# Patient Record
Sex: FEMALE | Race: BLACK OR AFRICAN AMERICAN | NOT HISPANIC OR LATINO | Employment: OTHER | URBAN - METROPOLITAN AREA
[De-identification: names, ages, dates, MRNs, and addresses within clinical notes are randomized per-mention and may not be internally consistent; named-entity substitution may affect disease eponyms.]

---

## 2017-01-14 DIAGNOSIS — F03.90 DEMENTIA WITHOUT BEHAVIORAL DISTURBANCE, UNSPECIFIED DEMENTIA TYPE: ICD-10-CM

## 2017-01-16 RX ORDER — DONEPEZIL HYDROCHLORIDE 10 MG/1
TABLET, FILM COATED ORAL
Qty: 30 TABLET | Refills: 3 | Status: SHIPPED | OUTPATIENT
Start: 2017-01-16 | End: 2018-03-06

## 2017-01-22 ENCOUNTER — TELEPHONE (OUTPATIENT)
Dept: URGENT CARE | Facility: CLINIC | Age: 67
End: 2017-01-22

## 2017-01-22 ENCOUNTER — OFFICE VISIT (OUTPATIENT)
Dept: URGENT CARE | Facility: CLINIC | Age: 67
End: 2017-01-22
Payer: MEDICARE

## 2017-01-22 VITALS
DIASTOLIC BLOOD PRESSURE: 76 MMHG | OXYGEN SATURATION: 98 % | TEMPERATURE: 98 F | BODY MASS INDEX: 22.36 KG/M2 | HEART RATE: 64 BPM | SYSTOLIC BLOOD PRESSURE: 128 MMHG | WEIGHT: 142.44 LBS | HEIGHT: 67 IN

## 2017-01-22 DIAGNOSIS — R10.84 GENERALIZED ABDOMINAL PAIN: Primary | ICD-10-CM

## 2017-01-22 DIAGNOSIS — K59.00 CONSTIPATION, UNSPECIFIED CONSTIPATION TYPE: ICD-10-CM

## 2017-01-22 PROCEDURE — 1157F ADVNC CARE PLAN IN RCRD: CPT | Mod: S$GLB,,, | Performed by: NURSE PRACTITIONER

## 2017-01-22 PROCEDURE — 3074F SYST BP LT 130 MM HG: CPT | Mod: S$GLB,,, | Performed by: NURSE PRACTITIONER

## 2017-01-22 PROCEDURE — 99999 PR PBB SHADOW E&M-EST. PATIENT-LVL IV: CPT | Mod: PBBFAC,,, | Performed by: NURSE PRACTITIONER

## 2017-01-22 PROCEDURE — 1160F RVW MEDS BY RX/DR IN RCRD: CPT | Mod: S$GLB,,, | Performed by: NURSE PRACTITIONER

## 2017-01-22 PROCEDURE — 99214 OFFICE O/P EST MOD 30 MIN: CPT | Mod: S$GLB,,, | Performed by: NURSE PRACTITIONER

## 2017-01-22 PROCEDURE — 1159F MED LIST DOCD IN RCRD: CPT | Mod: S$GLB,,, | Performed by: NURSE PRACTITIONER

## 2017-01-22 PROCEDURE — 3078F DIAST BP <80 MM HG: CPT | Mod: S$GLB,,, | Performed by: NURSE PRACTITIONER

## 2017-01-22 PROCEDURE — 1125F AMNT PAIN NOTED PAIN PRSNT: CPT | Mod: S$GLB,,, | Performed by: NURSE PRACTITIONER

## 2017-01-22 RX ORDER — LACTULOSE 10 G/15ML
20 SOLUTION ORAL DAILY PRN
Qty: 250 ML | Refills: 0 | Status: SHIPPED | OUTPATIENT
Start: 2017-01-22 | End: 2017-01-24 | Stop reason: ALTCHOICE

## 2017-01-22 NOTE — MR AVS SNAPSHOT
[facility]  [address]  [place] [address]  Phone: [phone]                  Marisol Ora   2017 12:00 PM   Office Visit    Description:  Female : 1950   Provider:  SAMSON Bowling   Department:  [facility]           Reason for Visit     Bloated     Abdominal Pain           Diagnoses this Visit        Comments    Generalized abdominal pain    -  Primary            To Do List           Future Appointments        Provider Department Dept Phone    2017 12:30 PM ADDY XR1 [facility]     2017 8:20 AM Lore Tijerina MD [facility] [phone]      Goals (5 Years of Data)     None      [facility]     [facility] -  Assistance  Registered nurses in the [facility] provide clinical advisement, health education, appointment booking, and other advisory services.  Call for this free service at [phone].             Medications           Message regarding Medications     Verify the changes and/or additions to your medication regime listed below are the same as discussed with your clinician today.  If any of these changes or additions are incorrect, please notify your healthcare provider.        STOP taking these medications     trazodone (DESYREL) 50 MG tablet TAKE 1 TABLET (50 MG TOTAL) BY MOUTH NIGHTLY AS NEEDED FOR INSOMNIA.           Verify that the below list of medications is an accurate representation of the medications you are currently taking.  If none reported, the list may be blank. If incorrect, please contact your healthcare provider. Carry this list with you in case of emergency.           Current Medications     aspirin 81 MG Chew Take 81 mg by mouth once daily.    donepezil (ARICEPT) 10 MG tablet TAKE 1 TABLET (10 MG TOTAL) BY MOUTH EVERY EVENING.    gabapentin (NEURONTIN) 300 MG capsule TAKE 1 CAPSULE (300 MG TOTAL) BY MOUTH EVERY EVENING.  "   losartan-hydrochlorothiazide 100-25 mg (HYZAAR) 100-25 mg per tablet TAKE 1 TABLET BY MOUTH ONCE DAILY.    meloxicam (MOBIC) 15 MG tablet TAKE 1 TABLET (15 MG TOTAL) BY MOUTH ONCE DAILY.    omeprazole (PRILOSEC) 20 MG capsule TAKE 1 CAPSULE (20 MG TOTAL) BY MOUTH ONCE DAILY.    oxybutynin (DITROPAN) 5 MG Tab TAKE 1 TABLET (5 MG TOTAL) BY MOUTH 2 (TWO) TIMES DAILY.    potassium chloride SA (K-DUR,KLOR-CON) 10 MEQ tablet TAKE 1 TABLET (10 MEQ TOTAL) BY MOUTH ONCE DAILY.    simvastatin (ZOCOR) 20 MG tablet TAKE 1 TABLET (20 MG TOTAL) BY MOUTH EVERY EVENING.           Clinical Reference Information           Vital Signs - Last Recorded  Most recent update: 1/22/2017 12:00 PM by Leslee Burnett LPN    BP Pulse Temp Ht Wt SpO2    128/76 (BP Location: Right arm, Patient Position: Sitting, BP Method: Manual) 64 98.4 °F (36.9 °C) 5' 7" (1.702 m) 64.6 kg (142 lb 6.7 oz) 98%    BMI                22.31 kg/m2          Blood Pressure          Most Recent Value    BP  128/76      Allergies as of 1/22/2017     No Known Allergies      Immunizations Administered on Date of Encounter - 1/22/2017     None      Orders Placed During Today's Visit     Future Labs/Procedures Expected by Expires    X-Ray Abdomen Flat And Erect  1/22/2017 1/22/2018      "

## 2017-01-22 NOTE — PATIENT INSTRUCTIONS
Constipation (Adult)  Constipation means that you have bowel movements that are less frequent than usual. Stools often become very hard and difficult to pass.  Constipation is very common. At some point in life it affects almost everyone. Since everyone's bowel habits are different, what is constipation to one person may not be to another. Your healthcare provider may do tests to diagnose constipation. It depends on what he or she finds when evaluating you.    Symptoms of constipation include:  · Abdominal pain  · Bloating  · Vomiting  · Painful bowel movements  · Itching, swelling, bleeding, or pain around the anus  Causes  Constipation can have many causes. These include:  · Diet low in fiber  · Too much dairy  · Not drinking enough liquids  · Lack of exercise or physical activity. This is especially true for older adults.  · Changes in lifestyle or daily routine, including pregnancy, aging, work, and travel  · Frequent use or misuse of laxatives  · Ignoring the urge to have a bowel movement or delaying it until later  · Medicines, such as certain prescription pain medicines, iron supplements, antacids, certain antidepressants, and calcium supplements  · Diseases like irritable bowel syndrome, bowel obstructions, stroke, diabetes, thyroid disease, Parkinson disease, hemorrhoids, and colon cancer  Complications  Potential complications of constipation can include:  · Hemorrhoids  · Rectal bleeding from hemorrhoids or anal fissures (skin tears)  · Hernias  · Dependency on laxatives  · Chronic constipation  · Fecal impaction  · Bowel obstruction or perforation  Home care  All treatment should be done after talking with your healthcare provider. This is especially true if you have another medical problems, are taking prescription medicines, or are an older adult. Treatment most often involves lifestyle changes. You may also need medicines. Your healthcare provider will tell you which will work best for you. Follow  the advice below to help avoid this problem in the future.  Lifestyle changes  These lifestyle changes can help prevent constipation:  · Diet. Eat a high-fiber diet, with fresh fruit and vegetables, and reduce dairy intake, meats, and processed foods  · Fluids. It's important to get enough fluids each day. Drink plenty of water when you eat more fiber. If you are on diet that limits the amount of fluid you can have, talk about this with your healthcare provider.  · Regular exercise. Check with your healthcare provider first.  Medications  Take any medicines as directed. Some laxatives are safe to use only every now and then. Others can be taken on a regular basis. Talk with your doctor or pharmacist if you have questions.  Prescription pain medicines can cause constipation. If you are taking this kind of medicine, ask your healthcare provider if you should also take a stool softener.  Medicines you may take to treat constipation include:  · Fiber supplements  · Stool softeners  · Laxatives  · Enemas  · Rectal suppositories  Follow-up care  Follow up with your healthcare provider if symptoms don't get better in the next few days. You may need to have more tests or see a specialist.  Call 911  Call 911 if any of these occur:  · Trouble breathing  · Stiff, rigid abdomen that is severely painful to touch  · Confusion  · Fainting or loss of consciousness  · Rapid heart rate  · Chest pain  When to seek medical advice  Call your healthcare provider right away if any of these occur:  · Fever over 100.4°F (38°C)  · Failure to resume normal bowel movements  · Pain in your abdomen or back gets worse  · Nausea or vomiting  · Swelling in your abdomen  · Blood in the stool  · Black, tarry stool  · Involuntary weight loss  · Weakness  © 8808-5207 [email or web address]. [address] [place] [address], [place] [address]. All rights reserved. This information is not intended as a substitute for professional medical care. Always follow  your healthcare professional's instructions.

## 2017-01-22 NOTE — PROGRESS NOTES
Subjective:       Patient ID: Marisol Payan is a 66 y.o. female.    Chief Complaint: Bloated (X 1 day ) and Abdominal Pain (RLQ)    Abdominal Pain   This is a new problem. The current episode started 1 to 4 weeks ago. The onset quality is gradual. The problem has been unchanged. The pain is located in the generalized abdominal region. The pain is at a severity of 2/10. The quality of the pain is aching and cramping. The abdominal pain does not radiate. Associated symptoms include belching and flatus. Pertinent negatives include no diarrhea, dysuria, fever, melena, nausea or vomiting. Nothing aggravates the pain. The pain is relieved by nothing. She has tried proton pump inhibitors for the symptoms. Her past medical history is significant for GERD.     Review of Systems   Constitutional: Negative for appetite change, chills and fever.   HENT: Negative for ear pain, sinus pressure, sore throat and trouble swallowing.    Eyes: Negative for visual disturbance.   Respiratory: Negative for shortness of breath.    Cardiovascular: Negative for chest pain.   Gastrointestinal: Positive for abdominal distention, abdominal pain and flatus. Negative for diarrhea, melena, nausea and vomiting.   Endocrine: Negative for cold intolerance, polyphagia and polyuria.   Genitourinary: Negative for decreased urine volume and dysuria.   Musculoskeletal: Negative for back pain.   Skin: Negative for rash.   Allergic/Immunologic: Negative for environmental allergies and food allergies.   Neurological: Negative for dizziness, tremors, weakness and numbness.   Hematological: Does not bruise/bleed easily.   Psychiatric/Behavioral: Negative for confusion and hallucinations. The patient is not nervous/anxious and is not hyperactive.    All other systems reviewed and are negative.      Objective:     Physical Exam   Constitutional: She is oriented to person, place, and time. She appears well-developed and well-nourished.   HENT:   Head:  Normocephalic and atraumatic.   Right Ear: External ear normal.   Left Ear: External ear normal.   Nose: Nose normal.   Mouth/Throat: Oropharynx is clear and moist.   Eyes: Conjunctivae and EOM are normal. Pupils are equal, round, and reactive to light.   Neck: Normal range of motion. Neck supple.   Cardiovascular: Normal rate, regular rhythm, normal heart sounds and intact distal pulses.    No murmur heard.  Pulmonary/Chest: Effort normal and breath sounds normal. She has no wheezes.   Abdominal: Soft. Bowel sounds are normal. She exhibits distension. There is generalized tenderness. There is no rigidity, no rebound, no guarding, no CVA tenderness, no tenderness at McBurney's point and negative Hernandez's sign.   Musculoskeletal: Normal range of motion.   Neurological: She is alert and oriented to person, place, and time. She has normal reflexes.   Skin: Skin is warm and dry. No rash noted.   Psychiatric: She has a normal mood and affect. Her behavior is normal. Judgment and thought content normal.   Nursing note and vitals reviewed.    Assessment:     1. Generalized abdominal pain    2. Constipation, unspecified constipation type      Plan:   Marisol was seen today for bloated and abdominal pain.    Diagnoses and all orders for this visit:    Generalized abdominal pain  -     X-Ray Abdomen Flat And Erect; Future    Constipation, unspecified constipation type    Other orders  -     lactulose (CHRONULAC) 20 gram/30 mL Soln; Take 30 mLs (20 g total) by mouth daily as needed (constipation).    Informed patient that I would notify her of final read of xray results. Did not see any obstructions but moderate amount of stool.  Recommended follow up with PCP next available. Agrees and understands plan.

## 2017-01-24 ENCOUNTER — OFFICE VISIT (OUTPATIENT)
Dept: INTERNAL MEDICINE | Facility: CLINIC | Age: 67
End: 2017-01-24
Payer: MEDICARE

## 2017-01-24 ENCOUNTER — PATIENT MESSAGE (OUTPATIENT)
Dept: INTERNAL MEDICINE | Facility: CLINIC | Age: 67
End: 2017-01-24

## 2017-01-24 ENCOUNTER — HOSPITAL ENCOUNTER (OUTPATIENT)
Dept: RADIOLOGY | Facility: HOSPITAL | Age: 67
Discharge: HOME | End: 2017-01-24
Attending: NURSE PRACTITIONER
Payer: MEDICARE

## 2017-01-24 VITALS
WEIGHT: 143.75 LBS | BODY MASS INDEX: 22.56 KG/M2 | OXYGEN SATURATION: 97 % | SYSTOLIC BLOOD PRESSURE: 118 MMHG | HEIGHT: 67 IN | DIASTOLIC BLOOD PRESSURE: 84 MMHG | HEART RATE: 64 BPM | TEMPERATURE: 96 F

## 2017-01-24 DIAGNOSIS — K21.9 GASTROESOPHAGEAL REFLUX DISEASE, ESOPHAGITIS PRESENCE NOT SPECIFIED: ICD-10-CM

## 2017-01-24 DIAGNOSIS — R10.9 ABDOMINAL PAIN, UNSPECIFIED LOCATION: Primary | ICD-10-CM

## 2017-01-24 DIAGNOSIS — R09.89 CAROTID BRUIT, UNSPECIFIED LATERALITY: ICD-10-CM

## 2017-01-24 DIAGNOSIS — M47.26 OSTEOARTHRITIS OF SPINE WITH RADICULOPATHY, LUMBAR REGION: ICD-10-CM

## 2017-01-24 DIAGNOSIS — K59.00 CONSTIPATION, UNSPECIFIED CONSTIPATION TYPE: ICD-10-CM

## 2017-01-24 DIAGNOSIS — R10.13 EPIGASTRIC PAIN: Primary | ICD-10-CM

## 2017-01-24 DIAGNOSIS — N30.00 ACUTE CYSTITIS WITHOUT HEMATURIA: ICD-10-CM

## 2017-01-24 DIAGNOSIS — I65.29 CAROTID ARTERY CALCIFICATION, UNSPECIFIED LATERALITY: ICD-10-CM

## 2017-01-24 PROCEDURE — 99499 UNLISTED E&M SERVICE: CPT | Mod: S$GLB,,, | Performed by: PHYSICIAN ASSISTANT

## 2017-01-24 PROCEDURE — 3074F SYST BP LT 130 MM HG: CPT | Mod: S$GLB,,, | Performed by: PHYSICIAN ASSISTANT

## 2017-01-24 PROCEDURE — 1160F RVW MEDS BY RX/DR IN RCRD: CPT | Mod: S$GLB,,, | Performed by: PHYSICIAN ASSISTANT

## 2017-01-24 PROCEDURE — 1157F ADVNC CARE PLAN IN RCRD: CPT | Mod: S$GLB,,, | Performed by: PHYSICIAN ASSISTANT

## 2017-01-24 PROCEDURE — 99999 PR PBB SHADOW E&M-EST. PATIENT-LVL IV: CPT | Mod: PBBFAC,,, | Performed by: PHYSICIAN ASSISTANT

## 2017-01-24 PROCEDURE — 3079F DIAST BP 80-89 MM HG: CPT | Mod: S$GLB,,, | Performed by: PHYSICIAN ASSISTANT

## 2017-01-24 PROCEDURE — 99214 OFFICE O/P EST MOD 30 MIN: CPT | Mod: S$GLB,,, | Performed by: PHYSICIAN ASSISTANT

## 2017-01-24 PROCEDURE — 74020 XR ABDOMEN FLAT AND ERECT: CPT | Mod: 26,,, | Performed by: RADIOLOGY

## 2017-01-24 PROCEDURE — 1159F MED LIST DOCD IN RCRD: CPT | Mod: S$GLB,,, | Performed by: PHYSICIAN ASSISTANT

## 2017-01-24 RX ORDER — MELOXICAM 15 MG/1
TABLET ORAL
Qty: 30 TABLET | Refills: 0 | Status: SHIPPED | OUTPATIENT
Start: 2017-01-24 | End: 2017-04-20 | Stop reason: SINTOL

## 2017-01-24 RX ORDER — MELOXICAM 15 MG/1
TABLET ORAL
Qty: 30 TABLET | Refills: 0 | Status: SHIPPED | OUTPATIENT
Start: 2017-01-24 | End: 2017-01-24 | Stop reason: SDUPTHER

## 2017-01-24 RX ORDER — POLYETHYLENE GLYCOL 3350 17 G/17G
17 POWDER, FOR SOLUTION ORAL DAILY
Qty: 10 EACH | Refills: 0 | Status: SHIPPED | OUTPATIENT
Start: 2017-01-24 | End: 2017-01-24 | Stop reason: SDUPTHER

## 2017-01-24 RX ORDER — AMOXICILLIN AND CLAVULANATE POTASSIUM 500; 125 MG/1; MG/1
1 TABLET, FILM COATED ORAL 2 TIMES DAILY
Qty: 14 TABLET | Refills: 0 | Status: SHIPPED | OUTPATIENT
Start: 2017-01-24 | End: 2017-01-31

## 2017-01-24 RX ORDER — POLYETHYLENE GLYCOL 3350 17 G/17G
17 POWDER, FOR SOLUTION ORAL DAILY
Qty: 10 EACH | Refills: 0 | Status: SHIPPED | OUTPATIENT
Start: 2017-01-24 | End: 2017-02-03

## 2017-01-24 NOTE — PROGRESS NOTES
Subjective:      Patient ID: Marisol Payan is a 67 y.o. female.    Chief Complaint: Flank Pain; Abdominal Pain; and Constipation    Constipation   This is a new problem. The current episode started in the past 7 days. The problem has been gradually worsening since onset. The stool is described as pellet like. Associated symptoms include abdominal pain, anorexia, back pain, bloating, nausea and vomiting (Gerd). Pertinent negatives include no diarrhea, difficulty urinating, fecal incontinence, fever, flatus, hematochezia, hemorrhoids, melena, rectal pain or weight loss. Associated symptoms comments: GERD. Risk factors: dementia. Treatments tried: lactulose. The treatment provided no relief. Her past medical history is significant for abdominal surgery (appendectomy), neurologic disease and psychiatric history. There is no history of endocrine disease, inflammatory bowel disease, irritable bowel syndrome, metabolic disease, neuromuscular disease or radiation treatment.   -gassy, right flank pain. Urgent care diagnosed her with constipation, prescribed lactulose. Still no BM.   -also requesting refill on mobic for her OA    Review of Systems   Constitutional: Positive for appetite change. Negative for activity change, chills, diaphoresis, fatigue, fever, unexpected weight change and weight loss.   HENT: Negative.    Eyes: Negative for visual disturbance.   Respiratory: Negative for chest tightness, shortness of breath and wheezing.    Cardiovascular: Negative for chest pain, palpitations and leg swelling.   Gastrointestinal: Positive for abdominal distention, abdominal pain, anorexia, bloating, constipation, nausea and vomiting (Gerd). Negative for anal bleeding, blood in stool, diarrhea, flatus, hematochezia, hemorrhoids, melena and rectal pain.   Genitourinary: Positive for flank pain. Negative for decreased urine volume, difficulty urinating, dysuria, pelvic pain and urgency.   Musculoskeletal: Positive  "for arthralgias and back pain. Negative for myalgias, neck pain and neck stiffness.   Skin: Negative for rash.   Neurological: Negative for dizziness, weakness, light-headedness, numbness and headaches.     Objective:     Visit Vitals    /84 (BP Location: Right arm, Patient Position: Sitting, BP Method: Manual)    Pulse 64    Temp 96.4 °F (35.8 °C) (Tympanic)    Ht 5' 7" (1.702 m)    Wt 65.2 kg (143 lb 11.8 oz)    SpO2 97%    BMI 22.51 kg/m2       Physical Exam   Constitutional: She is oriented to person, place, and time. She appears well-developed and well-nourished.   HENT:   Head: Normocephalic and atraumatic.   Right Ear: External ear normal.   Left Ear: External ear normal.   Nose: Nose normal.   Mouth/Throat: Oropharynx is clear and moist.   Eyes: Conjunctivae and EOM are normal. Pupils are equal, round, and reactive to light.   Neck: Normal range of motion. Neck supple. Carotid bruit is present.   Cardiovascular: Normal rate, regular rhythm and normal heart sounds.  Exam reveals no gallop and no friction rub.    No murmur heard.  Pulmonary/Chest: Effort normal and breath sounds normal. No respiratory distress. She has no wheezes. She has no rales. She exhibits no tenderness.   Abdominal: Soft. She exhibits no distension, no pulsatile liver, no fluid wave, no abdominal bruit, no ascites, no pulsatile midline mass and no mass. Bowel sounds are decreased. There is tenderness in the epigastric area. There is CVA tenderness. There is no rigidity, no rebound and no guarding.   Musculoskeletal: Normal range of motion.   Lymphadenopathy:     She has no cervical adenopathy.   Neurological: She is alert and oriented to person, place, and time.   Skin: Skin is warm and dry.   Psychiatric: She has a normal mood and affect. Her behavior is normal. Judgment and thought content normal.   Vitals reviewed.    Supine upright views of abdomen    Findings: The bowel gas pattern is nonspecific and nonobstructive. " There is no evidence for free air. No pathologic calcifications are identified.  There is mild levoscoliosis of lumbar spine.   Impression       1.  No active process suggested.           Assessment:     1. Abdominal pain, unspecified location    2. Constipation, unspecified constipation type    3. Gastroesophageal reflux disease, esophagitis presence not specified    4. Carotid artery calcification, unspecified laterality    5. Carotid bruit, unspecified laterality    6. Osteoarthritis of spine with radiculopathy, lumbar region      Plan:   Abdominal pain, unspecified location  -     CBC auto differential; Future; Expected date: 1/24/17  -     Comprehensive metabolic panel; Future  -     Urinalysis; Future; Expected date: 1/24/17  -     Ambulatory referral to Gastroenterology  -     Lipase; Future; Expected date: 1/24/17    GERD  -can increase omeprazole to twice daily   --increase fluids. No carbonated beverages or spicy foods. Drew foods.    Constipation, unspecified constipation type  -     polyethylene glycol (GLYCOLAX) 17 gram PwPk; Take 17 g by mouth once daily.  Dispense: 10 each; Refill: 0  -discontinue lactulose start miralax  -A handout on high fiber diet sent home with patient today      Carotid artery calcification, unspecified laterality  -     US Carotid Bilateral; Future; Expected date: 1/24/17    Carotid bruit, unspecified laterality  -     US Carotid Bilateral; Future; Expected date: 1/24/17    Osteoarthritis of spine with radiculopathy, lumbar region  -     meloxicam (MOBIC) 15 MG tablet; TAKE 1 TABLET (15 MG TOTAL) BY MOUTH ONCE DAILY.  Dispense: 30 tablet; Refill: 0  -refilled as requested    Return if symptoms worsen or fail to improve.

## 2017-01-24 NOTE — PATIENT INSTRUCTIONS
Treating Constipation  Constipation is a common and often uncomfortable problem. Constipation means you have bowel movements fewer than 3 times per week, or strain to pass hard, dry stool. It can last a short time. Or it can be a problem that never seems to go away. The good news is that it can often be treated and controlled.    Eat more fiber  One of the best ways to help treat constipation is to increase your fiber intake. You can do this either through diet or by using fiber supplements. Fiber (in whole grains, fruits, and vegetables) adds bulk and absorbs water to soften the stool. This helps the stool pass through the colon more easily. When you increase your fiber intake, do it slowly to avoid side effects such as bloating. Also increase the amount of water that you drink. Eating more of the following foods can add fiber to your diet.  · High-fiber cereals  · Whole grains, bran, and brown rice  · Vegetables such as carrots, broccoli, and greens  · Fresh fruits (especially apples, pears, and dried fruits like raisins and apricots)  · Nuts and legumes (especially beans such as lentils, kidney beans, and lima beans)  Get physically active  Exercise helps improve the working of your colon which helps ease constipation. Try to get some physical activity every day. If you havent been active for a while, talk to your healthcare provider before starting again.  Laxatives  Your healthcare provider may suggest an over-the-counter product to help ease your constipation. He or she may suggest the use of bulk-forming agents or laxatives. The use of laxatives, if used as directed, is common and safe. Follow directions carefully when using them. See your healthcare provider for new-onset constipation, or long-term constipation, to rule out other causes such as medicines or thyroid disease.  © 2938-1496 [employer]. [address] [place] [address], [place] [address]. All rights reserved. This information is not  intended as a substitute for professional medical care. Always follow your healthcare professional's instructions.        Treating Constipation  Constipation is a common and often uncomfortable problem. Constipation means you have bowel movements fewer than 3 times per week, or strain to pass hard, dry stool. It can last a short time. Or it can be a problem that never seems to go away. The good news is that it can often be treated and controlled.    Eat more fiber  One of the best ways to help treat constipation is to increase your fiber intake. You can do this either through diet or by using fiber supplements. Fiber (in whole grains, fruits, and vegetables) adds bulk and absorbs water to soften the stool. This helps the stool pass through the colon more easily. When you increase your fiber intake, do it slowly to avoid side effects such as bloating. Also increase the amount of water that you drink. Eating more of the following foods can add fiber to your diet.  · High-fiber cereals  · Whole grains, bran, and brown rice  · Vegetables such as carrots, broccoli, and greens  · Fresh fruits (especially apples, pears, and dried fruits like raisins and apricots)  · Nuts and legumes (especially beans such as lentils, kidney beans, and lima beans)  Get physically active  Exercise helps improve the working of your colon which helps ease constipation. Try to get some physical activity every day. If you havent been active for a while, talk to your healthcare provider before starting again.  Laxatives  Your healthcare provider may suggest an over-the-counter product to help ease your constipation. He or she may suggest the use of bulk-forming agents or laxatives. The use of laxatives, if used as directed, is common and safe. Follow directions carefully when using them. See your healthcare provider for new-onset constipation, or long-term constipation, to rule out other causes such as medicines or thyroid disease.  © 0896-1781  [employer]. [address] [place] [address], [place] [address]. All rights reserved. This information is not intended as a substitute for professional medical care. Always follow your healthcare professional's instructions.        Discharge Instructions: Eating a High-Fiber Diet  Your health care provider has prescribed a high-fiber diet for you. Fiber is what gives strength and structure to plants. Most grains, beans, vegetables, and fruits contain fiber. Foods rich in fiber are often low in calories and fat, but they fill you up more. These foods may also reduce the risk of certain health problems.  There are two types of fiber:  · Insoluble fiber. This is found in whole grains, cereals, and certain fruits and vegetables (such as apple skins, corn, and beans). Insoluble fiber is made up mainly of plant cell walls. It may prevent constipation and reduce the risk of certain types of cancer.  · Soluble fiber. This type of fiber is found in oats, beans, nuts, and certain fruits and vegetables (such as strawberries and peas). Soluble fiber turns to gel in the digestive system, slowing the movement of the digestive tract. It helps control blood sugar levels and can reduce cholesterol, which may help lower the risk of heart disease. Soluble fiber can also help control appetite.     Home care  · Know how much fiber you need a day. The recommended daily amount of fiber is 25 grams for women and 38 grams for men. After age 50, daily fiber needs drop to 21 grams for women and 30 grams for men.  · Ask your doctor about a fiber supplement. (Always take fiber supplements with a large glass of water.)  · Keep track of how much fiber you eat.  · Eat a variety of foods high in fiber.  · Learn to read and understand food labels.  · Ask your healthcare provider how much water you should be drinking.  · Look for these high-fiber foods:  ¨ Whole-grain breads and cereals  § 6 ounces a day give you about 18 grams of fiber (1 ounce  is equal to 1 slice of bread, 1 cup of dry cereal, or 1/2 cup of cooked rice).  § Include wheat and oat bran cereals, whole-wheat muffins or toast, and corn tortillas in your meals.  ¨ Fruits   § 2 cups a day give you about 8 grams of fiber.  § Apples, oranges, strawberries, pears, and bananas are good sources.  § Fruit juice does not contain as much fiber as the fruit it was made from.  ¨ Vegetables  § 2½ cups a day give you about 11 grams of fiber. Add asparagus, carrots, broccoli, peas, and corn to your meals.  ¨ Legumes  § 1/4 cup a day (in place of meat) gives you about 4 grams of fiber. Try navy beans, lentils, chickpeas, and soybeans.  ¨ Seeds   § A small handful of seeds gives you about 3 grams of fiber. Try sunflower seeds.  Follow-up  Make a follow-up appointment with a nutritionist as directed by our staff.  © 4802-0056 [employer]. [address] [place] [address]. All rights reserved. This information is not intended as a substitute for professional medical care. Always follow your healthcare professional's instructions.        Dehydration    The human body is comprised largely of water. If you lose more fluids than you take in, you can become dehydrated. This means there are not enough fluids in your body for it to function right. Mild dehydration can cause weakness, confusion, or muscle cramps. In extreme cases, it can lead to brain damage and even death. That's why prompt treatment is crucial.  Risk factors  Anyone can become dehydrated. But infants, children, and older adults are at greatest risk. You are most likely to lose fluids with severe vomiting, diarrhea, or a fever. Exercising or working hard--especially in hot weather--can also cause excess fluid loss.  What to do  Drinking liquids is the best way to prevent dehydration. Water is best, but juice or frozen pops can also help. Your doctor may suggest electrolyte solutions for sick infants and young children.  When to  go to the emergency room (ER)  Go to an ER right away for these symptoms:  Adults  · Very dark urine and little urine output  · Dizziness, weakness, confusion, fainting  Children  · Sunken eyes  · Little or no urine output (for infants, no wet diaper in 8 hours)  · Very dark urine  · Skin that doesn't bounce back quickly when pinched  · Crying without tears  What to expect in the ER  Your blood pressure, temperature, and heart rate will be checked. You may have blood or urine tests. The main treatment for dehydration is fluids. You may be given these to drink. Or, you may receive them through a vein in your arm. You also may be treated for diarrhea, vomiting, or a high fever.   © 2780-6371 [employer], [employer]. [address] [place] [address], [place] [address]. All rights reserved. This information is not intended as a substitute for professional medical care. Always follow your healthcare professional's instructions.

## 2017-01-24 NOTE — MR AVS SNAPSHOT
[facility] Internal Medicine  [address] [facility] [place] [address]  Phone: [phone]  Fax: [phone]                  Marisol BlandonShayneOsbaldo   2017 10:40 AM   Office Visit    Description:  Female : 1950   Provider:  Marla Reyes PA-C   Department:  [facility] - Internal Medicine           Reason for Visit     Flank Pain     Abdominal Pain     Constipation           Diagnoses this Visit        Comments    Abdominal pain, unspecified location    -  Primary     Constipation, unspecified constipation type         Gastroesophageal reflux disease, esophagitis presence not specified         Carotid artery calcification, unspecified laterality         Carotid bruit, unspecified laterality         Osteoarthritis of spine with radiculopathy, lumbar region                To Do List           Future Appointments        Provider Department Dept Phone    2017 12:05 PM LABORATORY, [facility] [phone]    2017 12:10 PM SPECIMEN, [facility] [phone]    2017 11:00 AM [facility] - Gastroenterology [phone]    2017 12:30 PM [facility] [phone]      Goals (5 Years of Data)     None      Follow-Up and Disposition     Return if symptoms worsen or fail to improve.       These Medications        Disp Refills Start End    meloxicam (MOBIC) 15 MG tablet 30 tablet 0 2017     TAKE 1 TABLET (15 MG TOTAL) BY MOUTH ONCE DAILY.    Pharmacy: [address] Ph #: [phone]       polyethylene glycol (GLYCOLAX) 17 gram PwPk 10 each 0 2017 2/3/2017    Take 17 g by mouth once daily. - Oral    Pharmacy: [address] Ph #: [phone]         [facility] Call     [facility] On Call Nurse Care Line -  Assistance  Registered nurses in the [facility]  provide clinical advisement, health education, appointment booking, and other advisory services.  Call for this free service at [phone].             Medications           Message regarding Medications     Verify the changes and/or additions to your medication regime listed below are the same as discussed with your clinician today.  If any of these changes or additions are incorrect, please notify your healthcare provider.        START taking these NEW medications        Refills    polyethylene glycol (GLYCOLAX) 17 gram PwPk 0    Sig: Take 17 g by mouth once daily.    Class: Normal    Route: Oral      STOP taking these medications     lactulose (CHRONULAC) 20 gram/30 mL Soln Take 30 mLs (20 g total) by mouth daily as needed (constipation).           Verify that the below list of medications is an accurate representation of the medications you are currently taking.  If none reported, the list may be blank. If incorrect, please contact your healthcare provider. Carry this list with you in case of emergency.           Current Medications     aspirin 81 MG Chew Take 81 mg by mouth once daily.    donepezil (ARICEPT) 10 MG tablet TAKE 1 TABLET (10 MG TOTAL) BY MOUTH EVERY EVENING.    gabapentin (NEURONTIN) 300 MG capsule TAKE 1 CAPSULE (300 MG TOTAL) BY MOUTH EVERY EVENING.    losartan-hydrochlorothiazide 100-25 mg (HYZAAR) 100-25 mg per tablet TAKE 1 TABLET BY MOUTH ONCE DAILY.    meloxicam (MOBIC) 15 MG tablet TAKE 1 TABLET (15 MG TOTAL) BY MOUTH ONCE DAILY.    omeprazole (PRILOSEC) 20 MG capsule TAKE 1 CAPSULE (20 MG TOTAL) BY MOUTH ONCE DAILY.    oxybutynin (DITROPAN) 5 MG Tab TAKE 1 TABLET (5 MG TOTAL) BY MOUTH 2 (TWO) TIMES DAILY.    polyethylene glycol (GLYCOLAX) 17 gram PwPk Take 17 g by mouth once daily.    potassium chloride SA (K-DUR,KLOR-CON) 10 MEQ tablet TAKE 1 TABLET (10 MEQ TOTAL) BY MOUTH ONCE DAILY.    simvastatin (ZOCOR) 20 MG tablet TAKE 1 TABLET (20 MG TOTAL) BY MOUTH EVERY EVENING.          "  Clinical Reference Information           Vital Signs - Last Recorded  Most recent update: 1/24/2017 10:58 AM by Demetrio Roblero LPN    BP Pulse Temp Ht    118/84 (BP Location: Right arm, Patient Position: Sitting, BP Method: Manual) 64 96.4 °F (35.8 °C) (Tympanic) 5' 7" (1.702 m)    Wt SpO2 BMI    65.2 kg (143 lb 11.8 oz) 97% 22.51 kg/m2      Blood Pressure          Most Recent Value    BP  118/84      Allergies as of 1/24/2017     No Known Allergies      Immunizations Administered on Date of Encounter - 1/24/2017     None      Orders Placed During Today's Visit      Normal Orders This Visit    Ambulatory referral to Gastroenterology     Future Labs/Procedures Expected by Expires    CBC auto differential  1/24/2017 1/24/2018    Lipase  1/24/2017 3/25/2018    Urinalysis  1/24/2017 3/25/2018    US Carotid Bilateral  1/24/2017 1/24/2018    Comprehensive metabolic panel  As directed 1/24/2018      Instructions      Treating Constipation  Constipation is a common and often uncomfortable problem. Constipation means you have bowel movements fewer than 3 times per week, or strain to pass hard, dry stool. It can last a short time. Or it can be a problem that never seems to go away. The good news is that it can often be treated and controlled.    Eat more fiber  One of the best ways to help treat constipation is to increase your fiber intake. You can do this either through diet or by using fiber supplements. Fiber (in whole grains, fruits, and vegetables) adds bulk and absorbs water to soften the stool. This helps the stool pass through the colon more easily. When you increase your fiber intake, do it slowly to avoid side effects such as bloating. Also increase the amount of water that you drink. Eating more of the following foods can add fiber to your diet.  · High-fiber cereals  · Whole grains, bran, and brown rice  · Vegetables such as carrots, broccoli, and greens  · Fresh fruits (especially apples, pears, and dried " fruits like raisins and apricots)  · Nuts and legumes (especially beans such as lentils, kidney beans, and lima beans)  Get physically active  Exercise helps improve the working of your colon which helps ease constipation. Try to get some physical activity every day. If you havent been active for a while, talk to your healthcare provider before starting again.  Laxatives  Your healthcare provider may suggest an over-the-counter product to help ease your constipation. He or she may suggest the use of bulk-forming agents or laxatives. The use of laxatives, if used as directed, is common and safe. Follow directions carefully when using them. See your healthcare provider for new-onset constipation, or long-term constipation, to rule out other causes such as medicines or thyroid disease.  © 7790-5539 [employer]. [address] [place] [address], [place] [address]. All rights reserved. This information is not intended as a substitute for professional medical care. Always follow your healthcare professional's instructions.        Treating Constipation  Constipation is a common and often uncomfortable problem. Constipation means you have bowel movements fewer than 3 times per week, or strain to pass hard, dry stool. It can last a short time. Or it can be a problem that never seems to go away. The good news is that it can often be treated and controlled.    Eat more fiber  One of the best ways to help treat constipation is to increase your fiber intake. You can do this either through diet or by using fiber supplements. Fiber (in whole grains, fruits, and vegetables) adds bulk and absorbs water to soften the stool. This helps the stool pass through the colon more easily. When you increase your fiber intake, do it slowly to avoid side effects such as bloating. Also increase the amount of water that you drink. Eating more of the following foods can add fiber to your diet.  · High-fiber cereals  · Whole grains, bran, and  brown rice  · Vegetables such as carrots, broccoli, and greens  · Fresh fruits (especially apples, pears, and dried fruits like raisins and apricots)  · Nuts and legumes (especially beans such as lentils, kidney beans, and lima beans)  Get physically active  Exercise helps improve the working of your colon which helps ease constipation. Try to get some physical activity every day. If you havent been active for a while, talk to your healthcare provider before starting again.  Laxatives  Your healthcare provider may suggest an over-the-counter product to help ease your constipation. He or she may suggest the use of bulk-forming agents or laxatives. The use of laxatives, if used as directed, is common and safe. Follow directions carefully when using them. See your healthcare provider for new-onset constipation, or long-term constipation, to rule out other causes such as medicines or thyroid disease.  © 9121-7127 [employer]. [address] [place] [address]. All rights reserved. This information is not intended as a substitute for professional medical care. Always follow your healthcare professional's instructions.        Discharge Instructions: Eating a High-Fiber Diet  Your health care provider has prescribed a high-fiber diet for you. Fiber is what gives strength and structure to plants. Most grains, beans, vegetables, and fruits contain fiber. Foods rich in fiber are often low in calories and fat, but they fill you up more. These foods may also reduce the risk of certain health problems.  There are two types of fiber:  · Insoluble fiber. This is found in whole grains, cereals, and certain fruits and vegetables (such as apple skins, corn, and beans). Insoluble fiber is made up mainly of plant cell walls. It may prevent constipation and reduce the risk of certain types of cancer.  · Soluble fiber. This type of fiber is found in oats, beans, nuts, and certain fruits and vegetables (such as  strawberries and peas). Soluble fiber turns to gel in the digestive system, slowing the movement of the digestive tract. It helps control blood sugar levels and can reduce cholesterol, which may help lower the risk of heart disease. Soluble fiber can also help control appetite.     Home care  · Know how much fiber you need a day. The recommended daily amount of fiber is 25 grams for women and 38 grams for men. After age 50, daily fiber needs drop to 21 grams for women and 30 grams for men.  · Ask your doctor about a fiber supplement. (Always take fiber supplements with a large glass of water.)  · Keep track of how much fiber you eat.  · Eat a variety of foods high in fiber.  · Learn to read and understand food labels.  · Ask your healthcare provider how much water you should be drinking.  · Look for these high-fiber foods:  ¨ Whole-grain breads and cereals  § 6 ounces a day give you about 18 grams of fiber (1 ounce is equal to 1 slice of bread, 1 cup of dry cereal, or 1/2 cup of cooked rice).  § Include wheat and oat bran cereals, whole-wheat muffins or toast, and corn tortillas in your meals.  ¨ Fruits   § 2 cups a day give you about 8 grams of fiber.  § Apples, oranges, strawberries, pears, and bananas are good sources.  § Fruit juice does not contain as much fiber as the fruit it was made from.  ¨ Vegetables  § 2½ cups a day give you about 11 grams of fiber. Add asparagus, carrots, broccoli, peas, and corn to your meals.  ¨ Legumes  § 1/4 cup a day (in place of meat) gives you about 4 grams of fiber. Try navy beans, lentils, chickpeas, and soybeans.  ¨ Seeds   § A small handful of seeds gives you about 3 grams of fiber. Try sunflower seeds.  Follow-up  Make a follow-up appointment with a nutritionist as directed by our staff.  © 2684-9787 [employer]. [address] [place] [address], [place] [address]. All rights reserved. This information is not intended as a substitute for professional medical care. Always  follow your healthcare professional's instructions.        Dehydration    The human body is comprised largely of water. If you lose more fluids than you take in, you can become dehydrated. This means there are not enough fluids in your body for it to function right. Mild dehydration can cause weakness, confusion, or muscle cramps. In extreme cases, it can lead to brain damage and even death. That's why prompt treatment is crucial.  Risk factors  Anyone can become dehydrated. But infants, children, and older adults are at greatest risk. You are most likely to lose fluids with severe vomiting, diarrhea, or a fever. Exercising or working hard--especially in hot weather--can also cause excess fluid loss.  What to do  Drinking liquids is the best way to prevent dehydration. Water is best, but juice or frozen pops can also help. Your doctor may suggest electrolyte solutions for sick infants and young children.  When to go to the emergency room (ER)  Go to an ER right away for these symptoms:  Adults  · Very dark urine and little urine output  · Dizziness, weakness, confusion, fainting  Children  · Sunken eyes  · Little or no urine output (for infants, no wet diaper in 8 hours)  · Very dark urine  · Skin that doesn't bounce back quickly when pinched  · Crying without tears  What to expect in the ER  Your blood pressure, temperature, and heart rate will be checked. You may have blood or urine tests. The main treatment for dehydration is fluids. You may be given these to drink. Or, you may receive them through a vein in your arm. You also may be treated for diarrhea, vomiting, or a high fever.   © 2744-3179 [employer]. [address] [place] [address], [place] [address]. All rights reserved. This information is not intended as a substitute for professional medical care. Always follow your healthcare professional's instructions.

## 2017-01-26 ENCOUNTER — TELEPHONE (OUTPATIENT)
Dept: INTERNAL MEDICINE | Facility: CLINIC | Age: 67
End: 2017-01-26

## 2017-01-26 NOTE — TELEPHONE ENCOUNTER
[facility] and states that they couldn't test pt blood bc it hemolyzed. So they dont have a results for the CBC amylase and lipase. Can you put more orders in so pt can come get more blood drawn. Thanks please danna

## 2017-01-31 ENCOUNTER — PATIENT MESSAGE (OUTPATIENT)
Dept: INTERNAL MEDICINE | Facility: CLINIC | Age: 67
End: 2017-01-31

## 2017-01-31 DIAGNOSIS — E87.6 HYPOKALEMIA: ICD-10-CM

## 2017-01-31 RX ORDER — POTASSIUM CHLORIDE 750 MG/1
TABLET, EXTENDED RELEASE ORAL
Qty: 30 TABLET | Refills: 2 | Status: SHIPPED | OUTPATIENT
Start: 2017-01-31 | End: 2018-05-01

## 2017-02-02 ENCOUNTER — LAB VISIT (OUTPATIENT)
Dept: LAB | Facility: HOSPITAL | Age: 67
End: 2017-02-02
Attending: FAMILY MEDICINE
Payer: MEDICARE

## 2017-02-02 DIAGNOSIS — R10.13 EPIGASTRIC PAIN: ICD-10-CM

## 2017-02-02 PROCEDURE — 82150 ASSAY OF AMYLASE: CPT

## 2017-02-02 PROCEDURE — 80053 COMPREHEN METABOLIC PANEL: CPT

## 2017-02-02 PROCEDURE — 36415 COLL VENOUS BLD VENIPUNCTURE: CPT | Mod: PO

## 2017-02-02 PROCEDURE — 83690 ASSAY OF LIPASE: CPT

## 2017-02-03 LAB
ALBUMIN SERPL BCP-MCNC: 4.2 G/DL
ALP SERPL-CCNC: 64 U/L
ALT SERPL W/O P-5'-P-CCNC: 14 U/L
AMYLASE SERPL-CCNC: 54 U/L
ANION GAP SERPL CALC-SCNC: 8 MMOL/L
AST SERPL-CCNC: 17 U/L
BILIRUB SERPL-MCNC: 0.5 MG/DL
BUN SERPL-MCNC: 12 MG/DL
CALCIUM SERPL-MCNC: 10 MG/DL
CHLORIDE SERPL-SCNC: 103 MMOL/L
CO2 SERPL-SCNC: 29 MMOL/L
CREAT SERPL-MCNC: 0.8 MG/DL
EST. GFR  (AFRICAN AMERICAN): >60 ML/MIN/1.73 M^2
EST. GFR  (NON AFRICAN AMERICAN): >60 ML/MIN/1.73 M^2
GLUCOSE SERPL-MCNC: 88 MG/DL
LIPASE SERPL-CCNC: 14 U/L
POTASSIUM SERPL-SCNC: 3.6 MMOL/L
PROT SERPL-MCNC: 7.7 G/DL
SODIUM SERPL-SCNC: 140 MMOL/L

## 2017-02-07 ENCOUNTER — HOSPITAL ENCOUNTER (OUTPATIENT)
Dept: RADIOLOGY | Facility: HOSPITAL | Age: 67
Discharge: HOME | End: 2017-02-07
Attending: FAMILY MEDICINE
Payer: MEDICARE

## 2017-02-07 ENCOUNTER — INITIAL CONSULT (OUTPATIENT)
Dept: GASTROENTEROLOGY | Facility: CLINIC | Age: 67
End: 2017-02-07
Payer: MEDICARE

## 2017-02-07 VITALS
HEART RATE: 72 BPM | WEIGHT: 151 LBS | SYSTOLIC BLOOD PRESSURE: 130 MMHG | BODY MASS INDEX: 23.65 KG/M2 | DIASTOLIC BLOOD PRESSURE: 74 MMHG

## 2017-02-07 DIAGNOSIS — I65.29 CAROTID ARTERY CALCIFICATION, UNSPECIFIED LATERALITY: ICD-10-CM

## 2017-02-07 DIAGNOSIS — R10.13 EPIGASTRIC ABDOMINAL PAIN: ICD-10-CM

## 2017-02-07 DIAGNOSIS — R14.0 BLOATING: ICD-10-CM

## 2017-02-07 DIAGNOSIS — R09.89 CAROTID BRUIT, UNSPECIFIED LATERALITY: ICD-10-CM

## 2017-02-07 DIAGNOSIS — R10.11 RUQ ABDOMINAL PAIN: Primary | ICD-10-CM

## 2017-02-07 PROBLEM — I77.9 CAROTID ARTERY DISEASE: Chronic | Status: ACTIVE | Noted: 2017-02-07

## 2017-02-07 PROCEDURE — 93880 EXTRACRANIAL BILAT STUDY: CPT | Mod: 26,,, | Performed by: RADIOLOGY

## 2017-02-07 PROCEDURE — 1160F RVW MEDS BY RX/DR IN RCRD: CPT | Mod: S$GLB,,, | Performed by: NURSE PRACTITIONER

## 2017-02-07 PROCEDURE — 99204 OFFICE O/P NEW MOD 45 MIN: CPT | Mod: S$GLB,,, | Performed by: NURSE PRACTITIONER

## 2017-02-07 PROCEDURE — 1159F MED LIST DOCD IN RCRD: CPT | Mod: S$GLB,,, | Performed by: NURSE PRACTITIONER

## 2017-02-07 PROCEDURE — 1157F ADVNC CARE PLAN IN RCRD: CPT | Mod: S$GLB,,, | Performed by: NURSE PRACTITIONER

## 2017-02-07 PROCEDURE — 93880 EXTRACRANIAL BILAT STUDY: CPT | Mod: TC,PO

## 2017-02-07 PROCEDURE — 3075F SYST BP GE 130 - 139MM HG: CPT | Mod: S$GLB,,, | Performed by: NURSE PRACTITIONER

## 2017-02-07 PROCEDURE — 99999 PR PBB SHADOW E&M-EST. PATIENT-LVL III: CPT | Mod: PBBFAC,,, | Performed by: NURSE PRACTITIONER

## 2017-02-07 PROCEDURE — 1125F AMNT PAIN NOTED PAIN PRSNT: CPT | Mod: S$GLB,,, | Performed by: NURSE PRACTITIONER

## 2017-02-07 PROCEDURE — 3078F DIAST BP <80 MM HG: CPT | Mod: S$GLB,,, | Performed by: NURSE PRACTITIONER

## 2017-02-07 NOTE — MR AVS SNAPSHOT
[facility] - Gastroenterology  [address] [place] [address]  Phone: [phone]  Fax: [phone]                  Marisol Payan   2017 11:00 AM   Initial consult    Description:  Female : 1950   Provider:  SAMSON Ibrahim   Department:  [facility] - Gastroenterology           Reason for Visit     Abdominal Pain     Gastroesophageal Reflux           Diagnoses this Visit        Comments    RUQ abdominal pain    -  Primary     Epigastric abdominal pain         Bloating                To Do List           Future Appointments        Provider Department Dept Phone    2017 12:30 PM [facility]2 [facility] [phone]    2017 10:45 AM [facility]1 [facility] [phone]    3/7/2017 10:20 AM SAMSON Ibrahim [facility] Gastroenterology [phone]    3/13/2017 11:20 AM Devorah Ogden MD [facility] Neurology [phone]      Goals (5 Years of Data)     None      [facility] On Call     [facility] Care Line -  Assistance  Registered nurses in the [facility] Center provide clinical advisement, health education, appointment booking, and other advisory services.  Call for this free service at [phone].             Medications           Message regarding Medications     Verify the changes and/or additions to your medication regime listed below are the same as discussed with your clinician today.  If any of these changes or additions are incorrect, please notify your healthcare provider.             Verify that the below list of medications is an accurate representation of the medications you are currently taking.  If none reported, the list may be blank. If incorrect, please contact your healthcare provider. Carry this list with you in case of emergency.           Current Medications     aspirin 81 MG Chew Take 81 mg by mouth once daily.    donepezil (ARICEPT) 10 MG tablet TAKE 1 TABLET (10 MG TOTAL) BY MOUTH EVERY EVENING.    gabapentin  (NEURONTIN) 300 MG capsule TAKE 1 CAPSULE (300 MG TOTAL) BY MOUTH EVERY EVENING.    losartan-hydrochlorothiazide 100-25 mg (HYZAAR) 100-25 mg per tablet TAKE 1 TABLET BY MOUTH ONCE DAILY.    omeprazole (PRILOSEC) 20 MG capsule TAKE 1 CAPSULE (20 MG TOTAL) BY MOUTH ONCE DAILY.    oxybutynin (DITROPAN) 5 MG Tab TAKE 1 TABLET (5 MG TOTAL) BY MOUTH 2 (TWO) TIMES DAILY.    potassium chloride SA (K-DUR,KLOR-CON) 10 MEQ tablet TAKE 1 TABLET (10 MEQ TOTAL) BY MOUTH ONCE DAILY.    simvastatin (ZOCOR) 20 MG tablet TAKE 1 TABLET (20 MG TOTAL) BY MOUTH EVERY EVENING.    meloxicam (MOBIC) 15 MG tablet TAKE 1 TABLET (15 MG TOTAL) BY MOUTH ONCE DAILY.           Clinical Reference Information           Your Vitals Were     BP Pulse Weight BMI       130/74 72 68.5 kg (151 lb 0.2 oz) 23.65 kg/m2       Blood Pressure          Most Recent Value    BP  130/74      Allergies as of 2/7/2017     No Known Allergies      Immunizations Administered on Date of Encounter - 2/7/2017     None      Orders Placed During Today's Visit      Normal Orders This Visit    Case request GI: ESOPHAGOGASTRODUODENOSCOPY (EGD)     Future Labs/Procedures Expected by Expires    US Abdomen Complete  2/7/2017 2/7/2018      Instructions    Start Miralax once daily       Language Assistance Services     ATTENTION: Language assistance services are available, free of charge. Please call [phone].      ATENCIÓN: Si habla español, tiene a juarez disposición servicios gratuitos de asistencia lingüística. Llame al [phone].     [facility] Ý: N?u b?n nói Ti?ng Vi?t, có các d?ch v? h? tr? ngôn ng? mi?n phí dành cho b?n. G?i s? [phone].         [facility] - Gastroenterology complies with applicable Federal civil rights laws and does not discriminate on the basis of race, color, national origin, age, disability, or sex.

## 2017-02-07 NOTE — PROGRESS NOTES
Clinic Consult:  [facility] Consultation Note    Reason for Consult:  The primary encounter diagnosis was RUQ abdominal pain. Diagnoses of Epigastric abdominal pain and Bloating were also pertinent to this visit.    PCP: Lore Tijerina   [address] [place] [address]    HPI:  This is a 67 y.o. female here for evaluation of the above  She is here with her daughter who provides most of the HPI given the patients cognitive dysfunction  She reports that over the last year, she has had progressively worsening bloating and dyspepsia.  Reports that her mother with often belch both after meals and on an empty stomach.  She will occasionally complain of epigastric pain associated with the belching.  Often regurgitates with the belching.  She has been on a PPI for the last year but has had no significant improvement in her symptom.  Recently she was evaluated by PCP for RUQ abdominal discomfort.  An x-ray was completed and she was found to have constipation.  She has taken a few dose of milk of magnesia which appears to have helped the constipation, but the pt continues with belching and abdominal pain.   No nausea or vomiting. No melena or hematochezia. No weight loss.      Review of Systems   Constitutional: Negative for chills, fever, malaise/fatigue and weight loss.   Respiratory: Negative for cough.    Cardiovascular: Negative for chest pain.   Gastrointestinal:        Per HPI   Musculoskeletal: Negative for myalgias.   Skin: Negative for itching and rash.   Neurological: Negative for headaches.   Psychiatric/Behavioral: The patient is not nervous/anxious.        Medical History:   Past Medical History   Diagnosis Date    Anxiety     Back pain     Dementia     Hyperlipidemia     Hypertension     OAB (overactive bladder)     Rheumatoid arthritis        Surgical History:  Past Surgical History   Procedure Laterality Date    Lumbar disc surgery  2011    Shoulder surgery Left 2000s      rotator cuff    Breast biopsy Right 2005 approx     negative     section  1986    Appendectomy  1980s       Family History:   Family History   Problem Relation Age of Onset    Hypertension Mother     Pancreatic cancer Mother     Cataracts Mother     Glaucoma Sister     Cancer Neg Hx     Stroke Neg Hx        Social History:   Social History   Substance Use Topics    Smoking status: Never Smoker    Smokeless tobacco: Never Used    Alcohol use No       Allergies: Reviewed    Home Medications:   Current Outpatient Prescriptions on File Prior to Visit   Medication Sig Dispense Refill    aspirin 81 MG Chew Take 81 mg by mouth once daily.      donepezil (ARICEPT) 10 MG tablet TAKE 1 TABLET (10 MG TOTAL) BY MOUTH EVERY EVENING. 30 tablet 3    gabapentin (NEURONTIN) 300 MG capsule TAKE 1 CAPSULE (300 MG TOTAL) BY MOUTH EVERY EVENING. 30 capsule 3    losartan-hydrochlorothiazide 100-25 mg (HYZAAR) 100-25 mg per tablet TAKE 1 TABLET BY MOUTH ONCE DAILY. 30 tablet 3    omeprazole (PRILOSEC) 20 MG capsule TAKE 1 CAPSULE (20 MG TOTAL) BY MOUTH ONCE DAILY. 30 capsule 3    oxybutynin (DITROPAN) 5 MG Tab TAKE 1 TABLET (5 MG TOTAL) BY MOUTH 2 (TWO) TIMES DAILY. 60 tablet 3    potassium chloride SA (K-DUR,KLOR-CON) 10 MEQ tablet TAKE 1 TABLET (10 MEQ TOTAL) BY MOUTH ONCE DAILY. 30 tablet 2    simvastatin (ZOCOR) 20 MG tablet TAKE 1 TABLET (20 MG TOTAL) BY MOUTH EVERY EVENING. 30 tablet 3    meloxicam (MOBIC) 15 MG tablet TAKE 1 TABLET (15 MG TOTAL) BY MOUTH ONCE DAILY. 30 tablet 0     No current facility-administered medications on file prior to visit.        Physical Exam:  Vital Signs:  Visit Vitals    /74    Pulse 72    Wt 68.5 kg (151 lb 0.2 oz)    BMI 23.65 kg/m2     Body mass index is 23.65 kg/(m^2).  Physical Exam   Constitutional: She is oriented to person, place, and time. She appears well-developed and well-nourished.   HENT:   Head: Normocephalic.   Eyes: No scleral icterus.   Neck:  Normal range of motion.   Cardiovascular: Normal rate and regular rhythm.    Pulmonary/Chest: Effort normal and breath sounds normal.   Abdominal: Soft. Bowel sounds are normal. She exhibits no distension. There is no tenderness.   Musculoskeletal: Normal range of motion.   Neurological: She is alert and oriented to person, place, and time.   Skin: Skin is warm and dry.   Psychiatric: She has a normal mood and affect.   Vitals reviewed.      Labs: Pertinent labs reviewed.      Assessment:  1. RUQ abdominal pain    2. Epigastric abdominal pain    3. Bloating         Recommendations:  RUQ abdominal pain    Epigastric abdominal pain  Bloating  - Unclear etiology  - Constipation vs GBD vs GERD  - Start Miralax once daily.   - U/S abdomen to R/O GBD.  May eventually need HIDA scan  - Will get EGD to R/O gastritis vs PUD  -     US Abdomen Complete; Future; Expected date: 2/7/17  -     Case request GI: ESOPHAGOGASTRODUODENOSCOPY (EGD)        4-6 weeks depending on results of above.       Thank you so much for allowing me to participate in the care of ERYN Wallace

## 2017-02-07 NOTE — LETTER
February 7, 2017      Lore Tijerina MD  [address] [place] [address]           [facility] Gastroenterology  [address]  [place] [address]  Phone: [phone]  Fax: [phone]          Patient: Marisol Payan   MR Number: [id number]   YOB: 1950   Date of Visit: 2/7/2017       Dear Dr. Lore Tijerina:    Thank you for referring Marisol Payan to me for evaluation. Attached you will find relevant portions of my assessment and plan of care.    If you have questions, please do not hesitate to call me. I look forward to following Marisol Payan along with you.    Sincerely,    Elba Frank, [facility]    Enclosure  CC:  No Recipients    If you would like to receive this communication electronically, please contact [email or web address] or [phone] to request more information on [employer] access.    For providers and/or their staff who would like to refer a patient to [facility], please contact us through our one-stop-shop provider referral line, [facility], [place], at [phone].    If you feel you have received this communication in error or would no longer like to receive these types of communications, please e-mail [email or web address]

## 2017-02-13 ENCOUNTER — TELEPHONE (OUTPATIENT)
Dept: RADIOLOGY | Facility: HOSPITAL | Age: 67
End: 2017-02-13

## 2017-02-14 ENCOUNTER — HOSPITAL ENCOUNTER (OUTPATIENT)
Dept: RADIOLOGY | Facility: HOSPITAL | Age: 67
Discharge: HOME | End: 2017-02-14
Attending: NURSE PRACTITIONER
Payer: MEDICARE

## 2017-02-14 DIAGNOSIS — R10.13 EPIGASTRIC ABDOMINAL PAIN: ICD-10-CM

## 2017-02-14 DIAGNOSIS — R10.11 RUQ ABDOMINAL PAIN: ICD-10-CM

## 2017-02-14 DIAGNOSIS — R14.0 BLOATING: ICD-10-CM

## 2017-02-14 PROCEDURE — 76700 US EXAM ABDOM COMPLETE: CPT | Mod: TC,PO

## 2017-02-14 PROCEDURE — 76700 US EXAM ABDOM COMPLETE: CPT | Mod: 26,,, | Performed by: RADIOLOGY

## 2017-02-20 ENCOUNTER — ANESTHESIA (OUTPATIENT)
Dept: ENDOSCOPY | Facility: HOSPITAL | Age: 67
End: 2017-02-20
Payer: MEDICARE

## 2017-02-20 ENCOUNTER — SURGERY (OUTPATIENT)
Age: 67
End: 2017-02-20

## 2017-02-20 ENCOUNTER — ANESTHESIA EVENT (OUTPATIENT)
Dept: ENDOSCOPY | Facility: HOSPITAL | Age: 67
End: 2017-02-20
Payer: MEDICARE

## 2017-02-20 ENCOUNTER — HOSPITAL ENCOUNTER (OUTPATIENT)
Facility: HOSPITAL | Age: 67
Discharge: HOME | End: 2017-02-20
Attending: INTERNAL MEDICINE | Admitting: INTERNAL MEDICINE
Payer: MEDICARE

## 2017-02-20 VITALS
SYSTOLIC BLOOD PRESSURE: 152 MMHG | HEIGHT: 65 IN | DIASTOLIC BLOOD PRESSURE: 93 MMHG | WEIGHT: 146 LBS | OXYGEN SATURATION: 100 % | TEMPERATURE: 98 F | HEART RATE: 53 BPM | BODY MASS INDEX: 24.32 KG/M2 | RESPIRATION RATE: 16 BRPM

## 2017-02-20 VITALS — RESPIRATION RATE: 19 BRPM

## 2017-02-20 DIAGNOSIS — R10.13 EPIGASTRIC ABDOMINAL PAIN: ICD-10-CM

## 2017-02-20 PROCEDURE — 43239 EGD BIOPSY SINGLE/MULTIPLE: CPT | Performed by: INTERNAL MEDICINE

## 2017-02-20 PROCEDURE — 43239 EGD BIOPSY SINGLE/MULTIPLE: CPT | Mod: ,,, | Performed by: INTERNAL MEDICINE

## 2017-02-20 PROCEDURE — 27201012 HC FORCEPS, HOT/COLD, DISP: Performed by: INTERNAL MEDICINE

## 2017-02-20 PROCEDURE — 88305 TISSUE EXAM BY PATHOLOGIST: CPT | Performed by: PATHOLOGY

## 2017-02-20 PROCEDURE — 25000003 PHARM REV CODE 250: Performed by: NURSE ANESTHETIST, CERTIFIED REGISTERED

## 2017-02-20 PROCEDURE — 37000008 HC ANESTHESIA 1ST 15 MINUTES: Performed by: INTERNAL MEDICINE

## 2017-02-20 PROCEDURE — 37000009 HC ANESTHESIA EA ADD 15 MINS: Performed by: INTERNAL MEDICINE

## 2017-02-20 PROCEDURE — 25000003 PHARM REV CODE 250: Performed by: INTERNAL MEDICINE

## 2017-02-20 PROCEDURE — 63600175 PHARM REV CODE 636 W HCPCS: Performed by: NURSE ANESTHETIST, CERTIFIED REGISTERED

## 2017-02-20 PROCEDURE — 88305 TISSUE EXAM BY PATHOLOGIST: CPT | Mod: 26,,, | Performed by: PATHOLOGY

## 2017-02-20 RX ORDER — SODIUM CHLORIDE, SODIUM LACTATE, POTASSIUM CHLORIDE, CALCIUM CHLORIDE 600; 310; 30; 20 MG/100ML; MG/100ML; MG/100ML; MG/100ML
INJECTION, SOLUTION INTRAVENOUS CONTINUOUS
Status: DISCONTINUED | OUTPATIENT
Start: 2017-02-20 | End: 2017-02-20 | Stop reason: HOSPADM

## 2017-02-20 RX ORDER — PROPOFOL 10 MG/ML
INJECTION, EMULSION INTRAVENOUS
Status: DISCONTINUED | OUTPATIENT
Start: 2017-02-20 | End: 2017-02-20

## 2017-02-20 RX ORDER — LIDOCAINE HCL/PF 100 MG/5ML
SYRINGE (ML) INTRAVENOUS
Status: DISCONTINUED | OUTPATIENT
Start: 2017-02-20 | End: 2017-02-20

## 2017-02-20 RX ORDER — SODIUM CHLORIDE, SODIUM LACTATE, POTASSIUM CHLORIDE, CALCIUM CHLORIDE 600; 310; 30; 20 MG/100ML; MG/100ML; MG/100ML; MG/100ML
INJECTION, SOLUTION INTRAVENOUS CONTINUOUS PRN
Status: DISCONTINUED | OUTPATIENT
Start: 2017-02-20 | End: 2017-02-20

## 2017-02-20 RX ADMIN — SODIUM CHLORIDE, SODIUM LACTATE, POTASSIUM CHLORIDE, AND CALCIUM CHLORIDE: .6; .31; .03; .02 INJECTION, SOLUTION INTRAVENOUS at 12:02

## 2017-02-20 RX ADMIN — PROPOFOL 30 MG: 10 INJECTION, EMULSION INTRAVENOUS at 01:02

## 2017-02-20 RX ADMIN — SODIUM CHLORIDE, SODIUM LACTATE, POTASSIUM CHLORIDE, AND CALCIUM CHLORIDE: 600; 310; 30; 20 INJECTION, SOLUTION INTRAVENOUS at 01:02

## 2017-02-20 RX ADMIN — LIDOCAINE HYDROCHLORIDE 75 MG: 20 INJECTION, SOLUTION INTRAVENOUS at 01:02

## 2017-02-20 RX ADMIN — PROPOFOL 20 MG: 10 INJECTION, EMULSION INTRAVENOUS at 01:02

## 2017-02-20 RX ADMIN — PROPOFOL 140 MG: 10 INJECTION, EMULSION INTRAVENOUS at 01:02

## 2017-02-20 NOTE — INTERVAL H&P NOTE
The patient has been examined and the H&P has been reviewed:    I concur with the findings and no changes have occurred since H&P was written.    Anesthesia/Surgery risks, benefits and alternative options discussed and understood by patient/family.          Active Hospital Problems    Diagnosis  POA    Epigastric abdominal pain [R10.13]  Yes      Resolved Hospital Problems    Diagnosis Date Resolved POA   No resolved problems to display.

## 2017-02-20 NOTE — ANESTHESIA POSTPROCEDURE EVALUATION
"Anesthesia Post Evaluation    Patient: Marisol Payan    Procedure(s) Performed: Procedure(s) (LRB):  ESOPHAGOGASTRODUODENOSCOPY (EGD) (N/A)    Final Anesthesia Type: MAC  Patient location during evaluation: PACU  Patient participation: Yes- Able to Participate  Level of consciousness: awake and alert and oriented  Post-procedure vital signs: reviewed and stable  Pain management: adequate  Airway patency: patent  PONV status at discharge: No PONV  Anesthetic complications: no      Cardiovascular status: blood pressure returned to baseline  Respiratory status: unassisted, room air and spontaneous ventilation  Hydration status: euvolemic  Follow-up not needed.        Visit Vitals    /72 (BP Location: Left arm, Patient Position: Lying, BP Method: Automatic)    Pulse 76    Temp 36.7 °C (98.1 °F) (Oral)    Resp 16    Ht 5' 5" (1.651 m)    Wt 66.2 kg (146 lb)    SpO2 95%    Breastfeeding No    BMI 24.3 kg/m2       Pain/Antoinette Score: No Data Recorded      "

## 2017-02-20 NOTE — H&P (VIEW-ONLY)
Clinic Consult:  [facility] Consultation Note    Reason for Consult:  The primary encounter diagnosis was RUQ abdominal pain. Diagnoses of Epigastric abdominal pain and Bloating were also pertinent to this visit.    PCP: oLre Tijerina   [address] [place] [address]    HPI:  This is a 67 y.o. female here for evaluation of the above  She is here with her daughter who provides most of the HPI given the patients cognitive dysfunction  She reports that over the last year, she has had progressively worsening bloating and dyspepsia.  Reports that her mother with often belch both after meals and on an empty stomach.  She will occasionally complain of epigastric pain associated with the belching.  Often regurgitates with the belching.  She has been on a PPI for the last year but has had no significant improvement in her symptom.  Recently she was evaluated by PCP for RUQ abdominal discomfort.  An x-ray was completed and she was found to have constipation.  She has taken a few dose of milk of magnesia which appears to have helped the constipation, but the pt continues with belching and abdominal pain.   No nausea or vomiting. No melena or hematochezia. No weight loss.      Review of Systems   Constitutional: Negative for chills, fever, malaise/fatigue and weight loss.   Respiratory: Negative for cough.    Cardiovascular: Negative for chest pain.   Gastrointestinal:        Per HPI   Musculoskeletal: Negative for myalgias.   Skin: Negative for itching and rash.   Neurological: Negative for headaches.   Psychiatric/Behavioral: The patient is not nervous/anxious.        Medical History:   Past Medical History   Diagnosis Date    Anxiety     Back pain     Dementia     Hyperlipidemia     Hypertension     OAB (overactive bladder)     Rheumatoid arthritis        Surgical History:  Past Surgical History   Procedure Laterality Date    Lumbar disc surgery  2011    Shoulder surgery Left 2000s      rotator cuff    Breast biopsy Right 2005 approx     negative     section  1986    Appendectomy  1980s       Family History:   Family History   Problem Relation Age of Onset    Hypertension Mother     Pancreatic cancer Mother     Cataracts Mother     Glaucoma Sister     Cancer Neg Hx     Stroke Neg Hx        Social History:   Social History   Substance Use Topics    Smoking status: Never Smoker    Smokeless tobacco: Never Used    Alcohol use No       Allergies: Reviewed    Home Medications:   Current Outpatient Prescriptions on File Prior to Visit   Medication Sig Dispense Refill    aspirin 81 MG Chew Take 81 mg by mouth once daily.      donepezil (ARICEPT) 10 MG tablet TAKE 1 TABLET (10 MG TOTAL) BY MOUTH EVERY EVENING. 30 tablet 3    gabapentin (NEURONTIN) 300 MG capsule TAKE 1 CAPSULE (300 MG TOTAL) BY MOUTH EVERY EVENING. 30 capsule 3    losartan-hydrochlorothiazide 100-25 mg (HYZAAR) 100-25 mg per tablet TAKE 1 TABLET BY MOUTH ONCE DAILY. 30 tablet 3    omeprazole (PRILOSEC) 20 MG capsule TAKE 1 CAPSULE (20 MG TOTAL) BY MOUTH ONCE DAILY. 30 capsule 3    oxybutynin (DITROPAN) 5 MG Tab TAKE 1 TABLET (5 MG TOTAL) BY MOUTH 2 (TWO) TIMES DAILY. 60 tablet 3    potassium chloride SA (K-DUR,KLOR-CON) 10 MEQ tablet TAKE 1 TABLET (10 MEQ TOTAL) BY MOUTH ONCE DAILY. 30 tablet 2    simvastatin (ZOCOR) 20 MG tablet TAKE 1 TABLET (20 MG TOTAL) BY MOUTH EVERY EVENING. 30 tablet 3    meloxicam (MOBIC) 15 MG tablet TAKE 1 TABLET (15 MG TOTAL) BY MOUTH ONCE DAILY. 30 tablet 0     No current facility-administered medications on file prior to visit.        Physical Exam:  Vital Signs:  Visit Vitals    /74    Pulse 72    Wt 68.5 kg (151 lb 0.2 oz)    BMI 23.65 kg/m2     Body mass index is 23.65 kg/(m^2).  Physical Exam   Constitutional: She is oriented to person, place, and time. She appears well-developed and well-nourished.   HENT:   Head: Normocephalic.   Eyes: No scleral icterus.   Neck:  Normal range of motion.   Cardiovascular: Normal rate and regular rhythm.    Pulmonary/Chest: Effort normal and breath sounds normal.   Abdominal: Soft. Bowel sounds are normal. She exhibits no distension. There is no tenderness.   Musculoskeletal: Normal range of motion.   Neurological: She is alert and oriented to person, place, and time.   Skin: Skin is warm and dry.   Psychiatric: She has a normal mood and affect.   Vitals reviewed.      Labs: Pertinent labs reviewed.      Assessment:  1. RUQ abdominal pain    2. Epigastric abdominal pain    3. Bloating         Recommendations:  RUQ abdominal pain    Epigastric abdominal pain  Bloating  - Unclear etiology  - Constipation vs GBD vs GERD  - Start Miralax once daily.   - U/S abdomen to R/O GBD.  May eventually need HIDA scan  - Will get EGD to R/O gastritis vs PUD  -     US Abdomen Complete; Future; Expected date: 2/7/17  -     Case request GI: ESOPHAGOGASTRODUODENOSCOPY (EGD)        4-6 weeks depending on results of above.       Thank you so much for allowing me to participate in the care of ERYN Wallace

## 2017-02-20 NOTE — IP AVS SNAPSHOT
[address] [place] [address]           Patient Discharge Instructions     Our goal is to set you up for success. This packet includes information on your condition, medications, and your home care. It will help you to care for yourself so you don't get sicker and need to go back to the hospital.     Please ask your nurse if you have any questions.        There are many details to remember when preparing to leave the hospital. Here is what you will need to do:    1. Take your medicine. If you are prescribed medications, review your Medication List in the following pages. You may have new medications to  at the pharmacy and others that you'll need to stop taking. Review the instructions for how and when to take your medications. Talk with your doctor or nurses if you are unsure of what to do.     2. Go to your follow-up appointments. Specific follow-up information is listed in the following pages. Your may be contacted by a transition nurse or clinical provider about future appointments. Be sure we have all of the phone numbers to reach you, if needed. Please contact your provider's office if you are unable to make an appointment.     3. Watch for warning signs. Your doctor or nurse will give you detailed warning signs to watch for and when to call for assistance. These instructions may also include educational information about your condition. If you experience any of warning signs to your health, call your doctor.               ** Verify the list of medication(s) below is accurate and up to date. Carry this with you in case of emergency. If your medications have changed, please notify your healthcare provider.             Medication List      CONTINUE taking these medications        Additional Info                      aspirin 81 MG Chew   Refills:  0   Dose:  81 mg    Instructions:  Take 81 mg by mouth once daily.     Begin Date    AM    Noon    PM     Bedtime       donepezil 10 MG tablet   Commonly known as:  ARICEPT   Quantity:  30 tablet   Refills:  3    Instructions:  TAKE 1 TABLET (10 MG TOTAL) BY MOUTH EVERY EVENING.     Begin Date    AM    Noon    PM    Bedtime       gabapentin 300 MG capsule   Commonly known as:  NEURONTIN   Quantity:  30 capsule   Refills:  3    Instructions:  TAKE 1 CAPSULE (300 MG TOTAL) BY MOUTH EVERY EVENING.     Begin Date    AM    Noon    PM    Bedtime       losartan-hydrochlorothiazide 100-25 mg 100-25 mg per tablet   Commonly known as:  HYZAAR   Quantity:  30 tablet   Refills:  3    Instructions:  TAKE 1 TABLET BY MOUTH ONCE DAILY.     Begin Date    AM    Noon    PM    Bedtime       meloxicam 15 MG tablet   Commonly known as:  MOBIC   Quantity:  30 tablet   Refills:  0    Instructions:  TAKE 1 TABLET (15 MG TOTAL) BY MOUTH ONCE DAILY.     Begin Date    AM    Noon    PM    Bedtime       omeprazole 20 MG capsule   Commonly known as:  PRILOSEC   Quantity:  30 capsule   Refills:  3    Instructions:  TAKE 1 CAPSULE (20 MG TOTAL) BY MOUTH ONCE DAILY.     Begin Date    AM    Noon    PM    Bedtime       oxybutynin 5 MG Tab   Commonly known as:  DITROPAN   Quantity:  60 tablet   Refills:  3    Instructions:  TAKE 1 TABLET (5 MG TOTAL) BY MOUTH 2 (TWO) TIMES DAILY.     Begin Date    AM    Noon    PM    Bedtime       potassium chloride SA 10 MEQ tablet   Commonly known as:  K-DUR,KLOR-CON   Quantity:  30 tablet   Refills:  2    Instructions:  TAKE 1 TABLET (10 MEQ TOTAL) BY MOUTH ONCE DAILY.     Begin Date    AM    Noon    PM    Bedtime       simvastatin 20 MG tablet   Commonly known as:  ZOCOR   Quantity:  30 tablet   Refills:  3    Instructions:  TAKE 1 TABLET (20 MG TOTAL) BY MOUTH EVERY EVENING.     Begin Date    AM    Noon    PM    Bedtime                  Please bring to all follow up appointments:    1. A copy of your discharge instructions.  2. All medicines you are currently taking in their original bottles.  3. Identification and  insurance card.    Please arrive 15 minutes ahead of scheduled appointment time.    Please call 24 hours in advance if you must reschedule your appointment and/or time.        Your Scheduled Appointments     Mar 06, 2017  9:40 AM CST   GASTROENTEROLOGY ESTABLISHED PATIENT with SAMSON Ibrahim   [facility] ([facility]    [address] [place] [address]   [phone]            Mar 13, 2017 11:20 AM CDT   Established Patient Visit with Devorah Ogden MD   [facility] ([facility])    [address] [place] [address] [phone]              Follow-up Information     Follow up with Lore Tijerina MD.    Specialty:  Family Medicine    Contact information:    [address] [place] [address] [phone]          Discharge Instructions     Future Orders    Activity as tolerated     Diet general     Questions:    Total calories:      Fat restriction, if any:      Protein restriction, if any:      Na restriction, if any:      Fluid restriction:      Additional restrictions:          Discharge Instructions         Gastritis (Adult)    Gastritis is inflammation and irritation of the stomach lining. It can be present for a short time (acute) or be long lasting (chronic). Gastritis is often caused by infection with bacteria called H pylori. More than a third of people in the [place] have this bacteria in their bodies. In many cases, H pylori causes no problems or symptoms. In some people, though, the infection irritates the stomach lining and causes gastritis. Other causes of stomach irritation include drinking alcohol or taking pain-relieving medicines called NSAIDs (such as aspirin or ibuprofen).   Symptoms of gastritis can include:  · Abdominal pain or bloating  · Loss of appetite  · Nausea or vomiting  · Vomiting blood or having black stools  · Feeling more tired than usual  An inflamed and irritated stomach lining is more likely to develop a sore called an ulcer. To help prevent  this, gastritis should be treated.  Home care  If needed, medicines may be prescribed. If you have H pylori infection, treating it will likely relieve your symptoms. Other changes can help reduce stomach irritation and help it heal.  · If you have been prescribed medicines for H pylori infection, take them as directed. Take all of the medicine until it is finished or your healthcare provider tells you to stop, even if you feel better.  · Your healthcare provider may recommend avoiding NSAIDs. If you take daily aspirin for your heart or other medical reasons, do not stop without talking to your healthcare provider first.  · Avoid drinking alcohol.  · Stop smoking. Smoking can irritate the stomach and delay healing. As much as possible, stay away from second hand smoke.  Follow-up care  Follow up with your healthcare provider, or as advised by our staff. Testing may be needed to check for inflammation or an ulcer.  When to seek medical advice  Call your healthcare provider for any of the following:  · Stomach pain that gets worse or moves to the lower right abdomen (appendix area)  · Chest pain that appears or gets worse, or spreads to the back, neck, shoulder, or arm  · Frequent vomiting (cant keep down liquids)  · Blood in the stool or vomit (red or black in color)  · Feeling weak or dizzy  · Fever of 100.4ºF (38ºC) or higher, or as directed by your healthcare provider  Date Last Reviewed: 6/22/2015 © 2000-2016 [employer] [place] [address]. All rights reserved. This information is not intended as a substitute for professional medical care. Always follow your healthcare professional's instructions.            Admission Information     Date & Time Provider Department CSN    2/20/2017 11:08 AM Asif Solorzano MD [facility] -  [id number]      Care Providers     Provider Role Specialty Primary office phone    Asif Solorzano MD Attending Provider  "Gastroenterology [phone]    Asif Solorzano MD Surgeon  Gastroenterology [phone]      Your Vitals Were     BP Pulse Temp Resp Height Weight    126/72 (BP Location: Left arm, Patient Position: Lying, BP Method: Automatic) 76 98.1 °F (36.7 °C) (Oral) 16 5' 5" (1.651 m) 66.2 kg (146 lb)    SpO2 BMI             95% 24.3 kg/m2         Recent Lab Values     No lab values to display.      Pending Labs     Order Current Status    Specimen to Pathology - Surgery Collected (02/20/17 1344)      Allergies as of 2/20/2017     No Known Allergies      [facility]     [facility] Nurse Care Line - 24/7 Assistance  Unless otherwise directed by your provider, please contact [facility], our nurse care line that is available for 24/7 assistance.     Registered nurses in the [facility] provide clinical advisement, health education, appointment booking, and other advisory services.  Call for this free service at [phone].        Advance Directives     An advance directive is a document which, in the event you are no longer able to make decisions for yourself, tells your healthcare team what kind of treatment you do or do not want to receive, or who you would like to make those decisions for you.  If you do not currently have an advance directive, [facility] encourages you to create one.  For more information call:  [phone]), [phone] ([phone]),  or log on to [email or web address].        Language Assistance Services     ATTENTION: Language assistance services are available, free of charge. Please call [phone].      ATENCIÓN: Si habla español, tiene a juarez disposición servicios gratuitos de asistencia lingüística. Llame al [phone].     CHÚ Ý: N?u b?n nói Ti?ng Vi?t, có các d?ch v? h? tr? ngôn ng? mi?n phí dành cho b?n. G?i s? [phone].         [facility] complies with applicable Federal civil rights laws and does not discriminate " on the basis of race, color, national origin, age, disability, or sex.

## 2017-02-20 NOTE — DISCHARGE INSTRUCTIONS
Gastritis (Adult)    Gastritis is inflammation and irritation of the stomach lining. It can be present for a short time (acute) or be long lasting (chronic). Gastritis is often caused by infection with bacteria called H pylori. More than a third of people in the [place] have this bacteria in their bodies. In many cases, H pylori causes no problems or symptoms. In some people, though, the infection irritates the stomach lining and causes gastritis. Other causes of stomach irritation include drinking alcohol or taking pain-relieving medicines called NSAIDs (such as aspirin or ibuprofen).   Symptoms of gastritis can include:  · Abdominal pain or bloating  · Loss of appetite  · Nausea or vomiting  · Vomiting blood or having black stools  · Feeling more tired than usual  An inflamed and irritated stomach lining is more likely to develop a sore called an ulcer. To help prevent this, gastritis should be treated.  Home care  If needed, medicines may be prescribed. If you have H pylori infection, treating it will likely relieve your symptoms. Other changes can help reduce stomach irritation and help it heal.  · If you have been prescribed medicines for H pylori infection, take them as directed. Take all of the medicine until it is finished or your healthcare provider tells you to stop, even if you feel better.  · Your healthcare provider may recommend avoiding NSAIDs. If you take daily aspirin for your heart or other medical reasons, do not stop without talking to your healthcare provider first.  · Avoid drinking alcohol.  · Stop smoking. Smoking can irritate the stomach and delay healing. As much as possible, stay away from second hand smoke.  Follow-up care  Follow up with your healthcare provider, or as advised by our staff. Testing may be needed to check for inflammation or an ulcer.  When to seek medical advice  Call your healthcare provider for any of the following:  · Stomach pain that gets worse or moves to the lower  right abdomen (appendix area)  · Chest pain that appears or gets worse, or spreads to the back, neck, shoulder, or arm  · Frequent vomiting (cant keep down liquids)  · Blood in the stool or vomit (red or black in color)  · Feeling weak or dizzy  · Fever of 100.4ºF (38ºC) or higher, or as directed by your healthcare provider  Date Last Reviewed: 6/22/2015  © 8767-3396 [employer] [address] [place] [address]. All rights reserved. This information is not intended as a substitute for professional medical care. Always follow your healthcare professional's instructions.

## 2017-02-20 NOTE — ANESTHESIA RELEASE NOTE
"Anesthesia Release from PACU Note    Patient: Marisol Payan    Procedure(s) Performed: Procedure(s) (LRB):  ESOPHAGOGASTRODUODENOSCOPY (EGD) (N/A)    Anesthesia type: MAC    Post pain: Adequate analgesia    Post assessment: no apparent anesthetic complications, tolerated procedure well and no evidence of recall    Last Vitals:   Visit Vitals    /72 (BP Location: Left arm, Patient Position: Lying, BP Method: Automatic)    Pulse 76    Temp 36.7 °C (98.1 °F) (Oral)    Resp 16    Ht 5' 5" (1.651 m)    Wt 66.2 kg (146 lb)    SpO2 95%    Breastfeeding No    BMI 24.3 kg/m2       Post vital signs: stable    Level of consciousness: awake and alert     Nausea/Vomiting: no nausea/no vomiting    Complications: none    Airway Patency: patent    Respiratory: unassisted, spontaneous ventilation, room air    Cardiovascular: stable    Hydration: euvolemic  "

## 2017-02-20 NOTE — ANESTHESIA PREPROCEDURE EVALUATION
02/20/2017  Marisol Payan is a 67 y.o., female.    [facility] Anesthesia Evaluation    I have reviewed the Patient Summary Reports.    I have reviewed the Nursing Notes.   I have reviewed the Medications.     Review of Systems  Anesthesia Hx:  No problems with previous Anesthesia    Social:  Former Smoker, No Alcohol Use    Hematology/Oncology:  Hematology Normal   Oncology Normal     EENT/Dental:EENT/Dental Normal   Cardiovascular:   Exercise tolerance: good Hypertension, well controlled CAD asymptomatic  hyperlipidemia    Renal/:  Renal/ Normal     Hepatic/GI:  Hepatic/GI Normal 0930 last drink of fluid.   Musculoskeletal:   Arthritis     Neurological:   TIA, Neuromuscular Disease,  Dementia    Endocrine:  Endocrine Normal    Psych:   Psychiatric History          Physical Exam  General:  Well nourished    Airway/Jaw/Neck:  Airway Findings: Mallampati: III                Anesthesia Plan  Type of Anesthesia, risks & benefits discussed:  Anesthesia Type:  MAC  Patient's Preference:   Intra-op Monitoring Plan:   Intra-op Monitoring Plan Comments:   Post Op Pain Control Plan:   Post Op Pain Control Plan Comments:   Induction:   IV  Beta Blocker:  Patient is not currently on a Beta-Blocker (No further documentation required).       Informed Consent: Patient understands risks and agrees with Anesthesia plan.  Questions answered. Anesthesia consent signed with patient.  ASA Score: 2     Day of Surgery Review of History & Physical: I have interviewed and examined the patient. I have reviewed the patient's H&P dated: 02/20/17. There are no significant changes.  H&P update referred to the surgeon.         Ready For Surgery From Anesthesia Perspective.

## 2017-02-20 NOTE — TRANSFER OF CARE
"Anesthesia Transfer of Care Note    Patient: Marisol Payan    Procedure(s) Performed: Procedure(s) (LRB):  ESOPHAGOGASTRODUODENOSCOPY (EGD) (N/A)    Patient location: PACU    Anesthesia Type: MAC    Transport from OR: Transported from OR on room air with adequate spontaneous ventilation    Post pain: adequate analgesia    Post assessment: no apparent anesthetic complications    Post vital signs: stable    Level of consciousness: sedated    Nausea/Vomiting: no nausea/vomiting    Complications: none          Last vitals:   Visit Vitals    /72 (BP Location: Left arm, Patient Position: Lying, BP Method: Automatic)    Pulse 76    Temp 36.7 °C (98.1 °F) (Oral)    Resp 16    Ht 5' 5" (1.651 m)    Wt 66.2 kg (146 lb)    SpO2 95%    Breastfeeding No    BMI 24.3 kg/m2     "

## 2017-02-20 NOTE — BRIEF OP NOTE
[facility]  Brief Operative Note     SUMMARY     Surgery Date: 2/20/2017     Surgeon(s) and Role:     * Asif Solorzano MD - Primary    Assisting Surgeon: None    Pre-op Diagnosis:  Bloating [R14.0]  Epigastric abdominal pain [R10.13]  RUQ abdominal pain [R10.11]    Post-op Diagnosis:  Post-Op Diagnosis Codes:     * Bloating [R14.0]     * Epigastric abdominal pain [R10.13]     * RUQ abdominal pain [R10.11]    Procedure(s) (LRB):  ESOPHAGOGASTRODUODENOSCOPY (EGD) (N/A)    Anesthesia: Monitor Anesthesia Care    Description of the findings of the procedure: Procedure completed. See Procedure note for details.     Findings/Key Components:  Procedure completed. See Procedure note for details.     Prosthetic/Devices: None    Estimated Blood Loss: None         Specimens:   Specimen (12h ago through future)    Start     Ordered    02/20/17 1342  Specimen to Pathology - Surgery  Once     Comments:  1.  GASTRIC BIOPSY - R/O H PYLORI    02/20/17 1344          Discharge Note    SUMMARY     Admit Date: 2/20/2017    Discharge Date and Time: 2/20/2017    Hospital Course (synopsis of major diagnoses, care, treatment, and services provided during the course of the hospital stay): Procedure completed. See Procedure note for details.     Final Diagnosis: Post-Op Diagnosis Codes:     * Bloating [R14.0]     * Epigastric abdominal pain [R10.13]     * RUQ abdominal pain [R10.11]    Disposition: Discharge home when discharge criteria met.    Follow Up/Patient Instructions: With primary care physician as previously scheduled.    Medications:  Reconciled Home Medications: Current Discharge Medication List      CONTINUE these medications which have NOT CHANGED    Details   aspirin 81 MG Chew Take 81 mg by mouth once daily.      donepezil (ARICEPT) 10 MG tablet TAKE 1 TABLET (10 MG TOTAL) BY MOUTH EVERY EVENING.  Qty: 30 tablet, Refills: 3    Associated Diagnoses: Dementia without behavioral disturbance, unspecified  dementia type      gabapentin (NEURONTIN) 300 MG capsule TAKE 1 CAPSULE (300 MG TOTAL) BY MOUTH EVERY EVENING.  Qty: 30 capsule, Refills: 3    Associated Diagnoses: Left-sided back pain      losartan-hydrochlorothiazide 100-25 mg (HYZAAR) 100-25 mg per tablet TAKE 1 TABLET BY MOUTH ONCE DAILY.  Qty: 30 tablet, Refills: 3    Associated Diagnoses: Essential hypertension      meloxicam (MOBIC) 15 MG tablet TAKE 1 TABLET (15 MG TOTAL) BY MOUTH ONCE DAILY.  Qty: 30 tablet, Refills: 0    Associated Diagnoses: Osteoarthritis of spine with radiculopathy, lumbar region      omeprazole (PRILOSEC) 20 MG capsule TAKE 1 CAPSULE (20 MG TOTAL) BY MOUTH ONCE DAILY.  Qty: 30 capsule, Refills: 3    Associated Diagnoses: Gastroesophageal reflux disease, esophagitis presence not specified      oxybutynin (DITROPAN) 5 MG Tab TAKE 1 TABLET (5 MG TOTAL) BY MOUTH 2 (TWO) TIMES DAILY.  Qty: 60 tablet, Refills: 3    Associated Diagnoses: OAB (overactive bladder)      potassium chloride SA (K-DUR,KLOR-CON) 10 MEQ tablet TAKE 1 TABLET (10 MEQ TOTAL) BY MOUTH ONCE DAILY.  Qty: 30 tablet, Refills: 2    Associated Diagnoses: Hypokalemia      simvastatin (ZOCOR) 20 MG tablet TAKE 1 TABLET (20 MG TOTAL) BY MOUTH EVERY EVENING.  Qty: 30 tablet, Refills: 3             Discharge Procedure Orders  Diet general     Activity as tolerated       Follow-up Information     Follow up with Lore Tijerina MD.    Specialty:  Family Medicine    Contact information:    [address] [place] [address] [phone]

## 2017-03-01 DIAGNOSIS — B96.81 HELICOBACTER PYLORI GASTRITIS: Primary | ICD-10-CM

## 2017-03-01 DIAGNOSIS — K21.9 GASTROESOPHAGEAL REFLUX DISEASE, ESOPHAGITIS PRESENCE NOT SPECIFIED: ICD-10-CM

## 2017-03-01 DIAGNOSIS — K29.70 HELICOBACTER PYLORI GASTRITIS: Primary | ICD-10-CM

## 2017-03-01 RX ORDER — OMEPRAZOLE 20 MG/1
20 CAPSULE, DELAYED RELEASE ORAL
Qty: 60 CAPSULE | Refills: 1 | Status: SHIPPED | OUTPATIENT
Start: 2017-03-01 | End: 2017-09-02 | Stop reason: SDUPTHER

## 2017-03-01 RX ORDER — CLARITHROMYCIN 500 MG/1
500 TABLET, FILM COATED ORAL 2 TIMES DAILY
Qty: 20 TABLET | Refills: 0 | Status: SHIPPED | OUTPATIENT
Start: 2017-03-01 | End: 2017-03-11

## 2017-03-01 RX ORDER — AMOXICILLIN 500 MG/1
1000 CAPSULE ORAL EVERY 12 HOURS
Qty: 40 CAPSULE | Refills: 0 | Status: SHIPPED | OUTPATIENT
Start: 2017-03-01 | End: 2017-03-11

## 2017-03-06 ENCOUNTER — OFFICE VISIT (OUTPATIENT)
Dept: GASTROENTEROLOGY | Facility: CLINIC | Age: 67
End: 2017-03-06
Payer: MEDICARE

## 2017-03-06 VITALS
SYSTOLIC BLOOD PRESSURE: 110 MMHG | HEIGHT: 65 IN | DIASTOLIC BLOOD PRESSURE: 66 MMHG | HEART RATE: 60 BPM | BODY MASS INDEX: 24.24 KG/M2 | WEIGHT: 145.5 LBS

## 2017-03-06 DIAGNOSIS — A04.8 H. PYLORI INFECTION: Primary | ICD-10-CM

## 2017-03-06 PROCEDURE — 1159F MED LIST DOCD IN RCRD: CPT | Mod: S$GLB,,, | Performed by: NURSE PRACTITIONER

## 2017-03-06 PROCEDURE — 1126F AMNT PAIN NOTED NONE PRSNT: CPT | Mod: S$GLB,,, | Performed by: NURSE PRACTITIONER

## 2017-03-06 PROCEDURE — 99499 UNLISTED E&M SERVICE: CPT | Mod: S$GLB,,, | Performed by: NURSE PRACTITIONER

## 2017-03-06 PROCEDURE — 3074F SYST BP LT 130 MM HG: CPT | Mod: S$GLB,,, | Performed by: NURSE PRACTITIONER

## 2017-03-06 PROCEDURE — 3078F DIAST BP <80 MM HG: CPT | Mod: S$GLB,,, | Performed by: NURSE PRACTITIONER

## 2017-03-06 PROCEDURE — 1157F ADVNC CARE PLAN IN RCRD: CPT | Mod: S$GLB,,, | Performed by: NURSE PRACTITIONER

## 2017-03-06 PROCEDURE — 99214 OFFICE O/P EST MOD 30 MIN: CPT | Mod: S$GLB,,, | Performed by: NURSE PRACTITIONER

## 2017-03-06 PROCEDURE — 99999 PR PBB SHADOW E&M-EST. PATIENT-LVL III: CPT | Mod: PBBFAC,,, | Performed by: NURSE PRACTITIONER

## 2017-03-06 PROCEDURE — 1160F RVW MEDS BY RX/DR IN RCRD: CPT | Mod: S$GLB,,, | Performed by: NURSE PRACTITIONER

## 2017-03-06 NOTE — PATIENT INSTRUCTIONS
Continue current medication    When antibiotics complete, stop omeprazole for 10 days.  On day 11, collect stool for H. Pylori test.

## 2017-03-06 NOTE — PROGRESS NOTES
Clinic Follow Up:  [facility] Follow Up Note    Reason for Follow Up:  The encounter diagnosis was H. pylori infection.    PCP: Lore Tijerina       HPI:  This is a 67 y.o. female here for follow up of the above  She is here with her daughter who provides most of the HPI as the pt has dementia.  She reports that since her recent EGD she has had some mild improvement in her symptoms.    She was just recently started on abx therapy for H. Pylori.    Denies any abdominal pain.  No nausea or vomiting.  No change in bowel pattern.  No melena or hematochezia. No weight loss.    Review of Systems   Constitutional: Negative for chills, fever, malaise/fatigue and weight loss.   Respiratory: Negative for cough.    Cardiovascular: Negative for chest pain.   Gastrointestinal:        Per HPI   Musculoskeletal: Negative for myalgias.   Skin: Negative for itching and rash.   Neurological: Negative for headaches.   Psychiatric/Behavioral: The patient is not nervous/anxious.        Medical History:  Past Medical History:   Diagnosis Date    Alzheimer's dementia     Anxiety     Back pain     Dementia     Hyperlipidemia     Hypertension     OAB (overactive bladder)     Rheumatoid arthritis     TIA (transient ischemic attack)        Surgical History:   Past Surgical History:   Procedure Laterality Date    APPENDECTOMY  1980s    BREAST BIOPSY Right 2005 approx    negative     SECTION  1986    LUMBAR DISC SURGERY  2011    SHOULDER SURGERY Left 2000s    rotator cuff       Family History:   Family History   Problem Relation Age of Onset    Hypertension Mother     Pancreatic cancer Mother     Cataracts Mother     Glaucoma Sister     Cancer Neg Hx     Stroke Neg Hx        Social History:   Social History   Substance Use Topics    Smoking status: Never Smoker    Smokeless tobacco: Never Used    Alcohol use No       Allergies: Reviewed    Home Medications:  Current Outpatient Prescriptions on  "File Prior to Visit   Medication Sig Dispense Refill    amoxicillin (AMOXIL) 500 MG capsule Take 2 capsules (1,000 mg total) by mouth every 12 (twelve) hours. 40 capsule 0    aspirin 81 MG Chew Take 81 mg by mouth once daily.      clarithromycin (BIAXIN) 500 MG tablet Take 1 tablet (500 mg total) by mouth 2 (two) times daily. 20 tablet 0    donepezil (ARICEPT) 10 MG tablet TAKE 1 TABLET (10 MG TOTAL) BY MOUTH EVERY EVENING. 30 tablet 3    gabapentin (NEURONTIN) 300 MG capsule TAKE 1 CAPSULE (300 MG TOTAL) BY MOUTH EVERY EVENING. 30 capsule 3    losartan-hydrochlorothiazide 100-25 mg (HYZAAR) 100-25 mg per tablet TAKE 1 TABLET BY MOUTH ONCE DAILY. 30 tablet 3    meloxicam (MOBIC) 15 MG tablet TAKE 1 TABLET (15 MG TOTAL) BY MOUTH ONCE DAILY. 30 tablet 0    omeprazole (PRILOSEC) 20 MG capsule Take 1 capsule (20 mg total) by mouth 2 (two) times daily before meals. 60 capsule 1    oxybutynin (DITROPAN) 5 MG Tab TAKE 1 TABLET (5 MG TOTAL) BY MOUTH 2 (TWO) TIMES DAILY. 60 tablet 3    simvastatin (ZOCOR) 20 MG tablet TAKE 1 TABLET (20 MG TOTAL) BY MOUTH EVERY EVENING. 30 tablet 3    potassium chloride SA (K-DUR,KLOR-CON) 10 MEQ tablet TAKE 1 TABLET (10 MEQ TOTAL) BY MOUTH ONCE DAILY. 30 tablet 2     No current facility-administered medications on file prior to visit.        Physical Exam:  Vital Signs:  /66  Pulse 60  Ht 5' 5" (1.651 m)  Wt 66 kg (145 lb 8.1 oz)  BMI 24.21 kg/m2  Body mass index is 24.21 kg/(m^2).  Physical Exam   Constitutional: She is oriented to person, place, and time. She appears well-developed and well-nourished.   HENT:   Head: Normocephalic.   Eyes: No scleral icterus.   Neck: Normal range of motion.   Cardiovascular: Normal rate and regular rhythm.    Pulmonary/Chest: Effort normal and breath sounds normal.   Abdominal: Soft. Bowel sounds are normal. She exhibits no distension. There is no tenderness.   Musculoskeletal: Normal range of motion.   Neurological: She is alert and " oriented to person, place, and time.   Skin: Skin is warm and dry.   Psychiatric: She has a normal mood and affect.   Vitals reviewed.      Labs: Pertinent labs reviewed.      Assessment:  1. H. pylori infection        Recommendations:  H. pylori infection  - Continue Abx therapy.  - Once complete, will hold PPI for 10 days and check stool for eradication.   -     H. pylori antigen, stool; Future; Expected date: 3/6/17        Return to Clinic:    Return if symptoms worsen or fail to improve.

## 2017-03-06 NOTE — MR AVS SNAPSHOT
[facility]  [address] [place] [address]  Phone: [phone]  Fax: [phone]                  Marisol Payan   3/6/2017 9:40 AM   Office Visit    Description:  Female : 1950   Provider:  SAMSON Ibrahim   Department:  [facility]           Reason for Visit     Abdominal Pain           Diagnoses this Visit        Comments    H. pylori infection    -  Primary            To Do List           Future Appointments        Provider Department Dept Phone    3/13/2017 11:20 AM Devorah Ogden MD [facility] [phone]      Goals (5 Years of Data)     None      Follow-Up and Disposition     Return if symptoms worsen or fail to improve.      [facility]     [facility] On Call Nurse Care Line -  Assistance  Registered nurses in the [facility] On Call Center provide clinical advisement, health education, appointment booking, and other advisory services.  Call for this free service at [phone].             Medications           Message regarding Medications     Verify the changes and/or additions to your medication regime listed below are the same as discussed with your clinician today.  If any of these changes or additions are incorrect, please notify your healthcare provider.             Verify that the below list of medications is an accurate representation of the medications you are currently taking.  If none reported, the list may be blank. If incorrect, please contact your healthcare provider. Carry this list with you in case of emergency.           Current Medications     amoxicillin (AMOXIL) 500 MG capsule Take 2 capsules (1,000 mg total) by mouth every 12 (twelve) hours.    aspirin 81 MG Chew Take 81 mg by mouth once daily.    clarithromycin (BIAXIN) 500 MG tablet Take 1 tablet (500 mg total) by mouth 2 (two) times daily.    donepezil (ARICEPT) 10 MG tablet TAKE 1 TABLET (10 MG TOTAL) BY MOUTH EVERY EVENING.    gabapentin (NEURONTIN) 300 MG  "capsule TAKE 1 CAPSULE (300 MG TOTAL) BY MOUTH EVERY EVENING.    losartan-hydrochlorothiazide 100-25 mg (HYZAAR) 100-25 mg per tablet TAKE 1 TABLET BY MOUTH ONCE DAILY.    meloxicam (MOBIC) 15 MG tablet TAKE 1 TABLET (15 MG TOTAL) BY MOUTH ONCE DAILY.    omeprazole (PRILOSEC) 20 MG capsule Take 1 capsule (20 mg total) by mouth 2 (two) times daily before meals.    oxybutynin (DITROPAN) 5 MG Tab TAKE 1 TABLET (5 MG TOTAL) BY MOUTH 2 (TWO) TIMES DAILY.    simvastatin (ZOCOR) 20 MG tablet TAKE 1 TABLET (20 MG TOTAL) BY MOUTH EVERY EVENING.    potassium chloride SA (K-DUR,KLOR-CON) 10 MEQ tablet TAKE 1 TABLET (10 MEQ TOTAL) BY MOUTH ONCE DAILY.           Clinical Reference Information           Your Vitals Were     BP Pulse Height Weight BMI    110/66 60 5' 5" (1.651 m) 66 kg (145 lb 8.1 oz) 24.21 kg/m2      Blood Pressure          Most Recent Value    BP  110/66      Allergies as of 3/6/2017     No Known Allergies      Immunizations Administered on Date of Encounter - 3/6/2017     None      Orders Placed During Today's Visit     Future Labs/Procedures Expected by Expires    H. pylori antigen, stool  3/6/2017 3/6/2018      Instructions    Continue current medication    When antibiotics complete, stop omeprazole for 10 days.  On day 11, collect stool for H. Pylori test.        Language Assistance Services     ATTENTION: Language assistance services are available, free of charge. Please call [phone].      ATENCIÓN: Si habla español, tiene a juarez disposición servicios gratuitos de asistencia lingüística. Llame al [phone].     [facility] Ý: N?u b?n nói Ti?ng Vi?t, có các d?ch v? h? tr? ngôn ng? mi?n phí dành cho b?n. G?i s? [phone].         [facility] complies with applicable Federal civil rights laws and does not discriminate on the basis of race, color, national origin, age, disability, or sex.        "

## 2017-03-13 ENCOUNTER — OFFICE VISIT (OUTPATIENT)
Dept: NEUROLOGY | Facility: CLINIC | Age: 67
End: 2017-03-13
Payer: MEDICARE

## 2017-03-13 VITALS
SYSTOLIC BLOOD PRESSURE: 120 MMHG | WEIGHT: 143.31 LBS | HEART RATE: 60 BPM | DIASTOLIC BLOOD PRESSURE: 70 MMHG | HEIGHT: 65 IN | BODY MASS INDEX: 23.88 KG/M2

## 2017-03-13 DIAGNOSIS — G30.1 LATE ONSET ALZHEIMER'S DISEASE WITHOUT BEHAVIORAL DISTURBANCE: Primary | ICD-10-CM

## 2017-03-13 DIAGNOSIS — F02.80 LATE ONSET ALZHEIMER'S DISEASE WITHOUT BEHAVIORAL DISTURBANCE: Primary | ICD-10-CM

## 2017-03-13 PROCEDURE — 1126F AMNT PAIN NOTED NONE PRSNT: CPT | Mod: S$GLB,,, | Performed by: PSYCHIATRY & NEUROLOGY

## 2017-03-13 PROCEDURE — 1160F RVW MEDS BY RX/DR IN RCRD: CPT | Mod: S$GLB,,, | Performed by: PSYCHIATRY & NEUROLOGY

## 2017-03-13 PROCEDURE — 99999 PR PBB SHADOW E&M-EST. PATIENT-LVL III: CPT | Mod: PBBFAC,,, | Performed by: PSYCHIATRY & NEUROLOGY

## 2017-03-13 PROCEDURE — 99215 OFFICE O/P EST HI 40 MIN: CPT | Mod: S$GLB,,, | Performed by: PSYCHIATRY & NEUROLOGY

## 2017-03-13 PROCEDURE — 3074F SYST BP LT 130 MM HG: CPT | Mod: S$GLB,,, | Performed by: PSYCHIATRY & NEUROLOGY

## 2017-03-13 PROCEDURE — 1157F ADVNC CARE PLAN IN RCRD: CPT | Mod: S$GLB,,, | Performed by: PSYCHIATRY & NEUROLOGY

## 2017-03-13 PROCEDURE — 99499 UNLISTED E&M SERVICE: CPT | Mod: S$GLB,,, | Performed by: PSYCHIATRY & NEUROLOGY

## 2017-03-13 PROCEDURE — 3078F DIAST BP <80 MM HG: CPT | Mod: S$GLB,,, | Performed by: PSYCHIATRY & NEUROLOGY

## 2017-03-13 PROCEDURE — 1159F MED LIST DOCD IN RCRD: CPT | Mod: S$GLB,,, | Performed by: PSYCHIATRY & NEUROLOGY

## 2017-03-13 RX ORDER — MEMANTINE HYDROCHLORIDE 5 MG-10 MG
KIT ORAL
Qty: 1 TABLET | Refills: 0 | Status: SHIPPED | OUTPATIENT
Start: 2017-03-13 | End: 2018-03-06

## 2017-03-13 NOTE — PROGRESS NOTES
Dementia     SUBJECTIVE:       HPI:   She is stable. Neuropsychological testing has been done . She is here with her daughter for follow up.    Past Medical History   Diagnosis Date    Anxiety     Dementia     Hyperlipidemia     Hypertension     OAB (overactive bladder)     Rheumatoid arthritis      Past Surgical History   Procedure Laterality Date    Lumbar disc surgery      Shoulder surgery Left      rotator cuff    Appendectomy       section      Breast biopsy Right      negative     Family History   Problem Relation Age of Onset    Hypertension Mother     Pancreatic cancer Mother     Cataracts Mother     Glaucoma Sister      Social History   Substance Use Topics    Smoking status: Never Smoker    Smokeless tobacco: None    Alcohol use No     No Known Allergies    Review of Systems   Constitutional: Negative for fever and weight loss.   HENT: Negative for hearing loss.    Eyes: Negative for blurred vision, double vision, photophobia and pain.   Respiratory: Negative for cough.    Cardiovascular: Negative for chest pain.   Gastrointestinal: Negative for abdominal pain, nausea and vomiting.   Genitourinary: Negative for dysuria, frequency and urgency.   Musculoskeletal: Positive for back pain. Negative for falls, joint pain, myalgias and neck pain.   Skin: Negative for itching and rash.   Neurological: Negative for tingling and headaches.   Psychiatric/Behavioral: Positive for memory loss. Negative for depression.         OBJECTIVE:     Physical Exam   Constitutional: She is oriented to person, place, and time. She appears well-developed and well-nourished.   HENT:   Head: Normocephalic and atraumatic.   Eyes: Conjunctivae and EOM are normal. Pupils are equal, round, and reactive to light.   Neck: Normal range of motion. Neck supple. No JVD present. No tracheal deviation present. No thyromegaly present.   Cardiovascular: Normal rate, regular rhythm and normal heart sounds.     Pulmonary/Chest: Effort normal and breath sounds normal.   Abdominal: She exhibits no distension. There is no tenderness.   Musculoskeletal: Normal range of motion. She exhibits no edema or tenderness.   Neurological: She is alert and oriented to person, place, and time. She has normal strength and normal reflexes. She displays normal reflexes. No cranial nerve deficit or sensory deficit. She exhibits normal muscle tone. She displays a negative Romberg sign. Coordination and gait normal.   Reflex Scores:       Tricep reflexes are 2+ on the right side and 2+ on the left side.       Bicep reflexes are 2+ on the right side and 2+ on the left side.       Brachioradialis reflexes are 2+ on the right side and 2+ on the left side.       Patellar reflexes are 2+ on the right side and 2+ on the left side.       Achilles reflexes are 2+ on the right side and 2+ on the left side.  Skin: Skin is warm and dry. No rash noted.   Psychiatric: Thought content normal. Her affect is blunt. Her speech is delayed and slurred. She is slowed and withdrawn. Cognition and memory are impaired. She expresses impulsivity. She exhibits abnormal recent memory and abnormal remote memory. She is inattentive.       Strength  Deltoids Triceps Biceps Wrist Extension Wrist Flexion Hand    Upper: R 5/5 5/5 5/5 5/5 5/5 5/5    L 5/5 5/5 5/5 5/5 5/5 5/5     Iliopsoas Quadriceps Knee  Flexion Tibialis  anterior Gastro- cnemius EHL   Lower: R 5/5 5/5 5/5 5/5 5/5 5/5    L 5/5 5/5 5/5 5/5 5/5 5/5       Laboratory:  Lab Results   Component Value Date    WBC 7.16 11/05/2015    HGB 11.9 (L) 11/05/2015    HCT 37.6 11/05/2015     11/05/2015    CHOL 179 06/14/2016    TRIG 92 06/14/2016    HDL 63 06/14/2016    ALT 16 11/05/2015    AST 18 11/05/2015     06/14/2016    K 3.3 (L) 06/14/2016     06/14/2016    CREATININE 0.8 06/14/2016    BUN 14 06/14/2016    CO2 28 06/14/2016    TSH 1.495 11/05/2015   Results for BARAK ARMANDO (MRN  [id number]) as of 8/11/2016 16:00   Ref. Range 11/5/2015 07:27 6/14/2016 16:11   Vitamin B-12 Latest Ref Range: 210 - 950 pg/mL 564      MRI of the brain: 8/27/2016    Impression          Generalized brain atrophy and chronic white matter small vessel ischemic changes.  Small maxillary sinus polyps or retention cysts.  No acute intracranial abnormality.     EEG : Normal     Neuropsychological testing has been done:  12/2/16  Impression:Major neurocognitive disorder due to Alzheimer's disease.      ASSESSMENT/PLAN:     Primary Diagnoses:  1. Dementia : early onset Alzheimer's disease.    Patient Active Problem List   Diagnosis    OAB (overactive bladder)    Anxiety    Dementia    Hyperlipidemia    Hypertension    Osteoarthritis of spine with radiculopathy, lumbar region        Plan:  Will add namenda . We talked about future care and management.  Home health Aid , advised for Medication care and ADLs.  Time spent: 30 minutes in face to face discussion concerning diagnosis, prognosis, review of lab and test results, benefits of treatment as well as management of disease, counseling of patient and coordination of care between various health care providers . Greater than half the time spent was used for coordination of care and counseling of patient.

## 2017-03-13 NOTE — MR AVS SNAPSHOT
[facility]  [address] [place] [address]  Phone: [phone]                  Marisol BlandonShayneOsbaldo   3/13/2017 11:20 AM   Office Visit    Description:  Female : 1950   Provider:  Devorah Ogden MD   Department:  [facility] - Neurology           Reason for Visit     Follow-up           Diagnoses this Visit        Comments    Late onset Alzheimer's disease without behavioral disturbance    -  Primary            To Do List           Future Appointments        Provider Department Dept Phone    2017 11:20 AM Devorah Ogden MD [facility] [phone]      Goals (5 Years of Data)     None      Follow-Up and Disposition     Return in about 6 months (around 2017).       These Medications        Disp Refills Start End    memantine (NAMENDA TITRATION PACK) tablet pack 1 tablet 0 3/13/2017 3/13/2018    Follow package directions.    Pharmacy: [facility]/pharmacy #[id number] - [place], [place] - [address] #: [phone]         [facility] On Call     [facility] On Call Nurse Care Line -  Assistance  Registered nurses in the [facility] On Call Center provide clinical advisement, health education, appointment booking, and other advisory services.  Call for this free service at [phone].             Medications           Message regarding Medications     Verify the changes and/or additions to your medication regime listed below are the same as discussed with your clinician today.  If any of these changes or additions are incorrect, please notify your healthcare provider.        START taking these NEW medications        Refills    memantine (NAMENDA TITRATION PACK) tablet pack 0    Sig: Follow package directions.    Class: Normal           Verify that the below list of medications is an accurate representation of the medications you are currently taking.  If none reported, the list may be blank. If incorrect, please contact your healthcare provider. Carry this list with you in  "case of emergency.           Current Medications     aspirin 81 MG Chew Take 81 mg by mouth once daily.    donepezil (ARICEPT) 10 MG tablet TAKE 1 TABLET (10 MG TOTAL) BY MOUTH EVERY EVENING.    gabapentin (NEURONTIN) 300 MG capsule TAKE 1 CAPSULE (300 MG TOTAL) BY MOUTH EVERY EVENING.    losartan-hydrochlorothiazide 100-25 mg (HYZAAR) 100-25 mg per tablet TAKE 1 TABLET BY MOUTH ONCE DAILY.    meloxicam (MOBIC) 15 MG tablet TAKE 1 TABLET (15 MG TOTAL) BY MOUTH ONCE DAILY.    omeprazole (PRILOSEC) 20 MG capsule Take 1 capsule (20 mg total) by mouth 2 (two) times daily before meals.    oxybutynin (DITROPAN) 5 MG Tab TAKE 1 TABLET (5 MG TOTAL) BY MOUTH 2 (TWO) TIMES DAILY.    potassium chloride SA (K-DUR,KLOR-CON) 10 MEQ tablet TAKE 1 TABLET (10 MEQ TOTAL) BY MOUTH ONCE DAILY.    simvastatin (ZOCOR) 20 MG tablet TAKE 1 TABLET (20 MG TOTAL) BY MOUTH EVERY EVENING.    memantine (NAMENDA TITRATION PACK) tablet pack Follow package directions.           Clinical Reference Information           Your Vitals Were     BP Pulse Height Weight BMI    120/70 60 5' 5" (1.651 m) 65 kg (143 lb 4.8 oz) 23.85 kg/m2      Blood Pressure          Most Recent Value    BP  120/70      Allergies as of 3/13/2017     No Known Allergies      Immunizations Administered on Date of Encounter - 3/13/2017     None      Orders Placed During Today's Visit      Normal Orders This Visit    Ambulatory referral to Home Health       Language Assistance Services     ATTENTION: Language assistance services are available, free of charge. Please call [phone].      ATENCIÓN: Si habla español, tiene a juarez disposición servicios gratuitos de asistencia lingüística. Llame al [phone].     [facility] Ý: N?u b?n nói Ti?ng Vi?t, có các d?ch v? h? tr? ngôn ng? mi?n phí dành cho b?n. G?i s? [phone].         [facility] complies with applicable Federal civil rights laws and does not discriminate on the basis of race, color, national origin, age, " disability, or sex.

## 2017-03-14 ENCOUNTER — PATIENT MESSAGE (OUTPATIENT)
Dept: NEUROLOGY | Facility: CLINIC | Age: 67
End: 2017-03-14

## 2017-04-19 ENCOUNTER — PATIENT MESSAGE (OUTPATIENT)
Dept: NEUROLOGY | Facility: CLINIC | Age: 67
End: 2017-04-19

## 2017-04-20 ENCOUNTER — OFFICE VISIT (OUTPATIENT)
Dept: INTERNAL MEDICINE | Facility: CLINIC | Age: 67
End: 2017-04-20
Payer: MEDICARE

## 2017-04-20 VITALS
OXYGEN SATURATION: 96 % | HEIGHT: 65 IN | HEART RATE: 86 BPM | DIASTOLIC BLOOD PRESSURE: 72 MMHG | BODY MASS INDEX: 23.43 KG/M2 | WEIGHT: 140.63 LBS | TEMPERATURE: 97 F | SYSTOLIC BLOOD PRESSURE: 124 MMHG

## 2017-04-20 DIAGNOSIS — G89.29 CHRONIC LEFT-SIDED LOW BACK PAIN WITH LEFT-SIDED SCIATICA: ICD-10-CM

## 2017-04-20 DIAGNOSIS — M54.42 CHRONIC LEFT-SIDED LOW BACK PAIN WITH LEFT-SIDED SCIATICA: ICD-10-CM

## 2017-04-20 DIAGNOSIS — I77.9 BILATERAL CAROTID ARTERY DISEASE: ICD-10-CM

## 2017-04-20 DIAGNOSIS — N63.20 LEFT BREAST MASS: Primary | ICD-10-CM

## 2017-04-20 PROCEDURE — 3074F SYST BP LT 130 MM HG: CPT | Mod: S$GLB,,, | Performed by: INTERNAL MEDICINE

## 2017-04-20 PROCEDURE — 99214 OFFICE O/P EST MOD 30 MIN: CPT | Mod: S$GLB,,, | Performed by: INTERNAL MEDICINE

## 2017-04-20 PROCEDURE — 1159F MED LIST DOCD IN RCRD: CPT | Mod: S$GLB,,, | Performed by: INTERNAL MEDICINE

## 2017-04-20 PROCEDURE — 99999 PR PBB SHADOW E&M-EST. PATIENT-LVL III: CPT | Mod: PBBFAC,,, | Performed by: INTERNAL MEDICINE

## 2017-04-20 PROCEDURE — 1160F RVW MEDS BY RX/DR IN RCRD: CPT | Mod: S$GLB,,, | Performed by: INTERNAL MEDICINE

## 2017-04-20 PROCEDURE — 99499 UNLISTED E&M SERVICE: CPT | Mod: S$GLB,,, | Performed by: INTERNAL MEDICINE

## 2017-04-20 PROCEDURE — 3078F DIAST BP <80 MM HG: CPT | Mod: S$GLB,,, | Performed by: INTERNAL MEDICINE

## 2017-04-20 RX ORDER — GABAPENTIN 300 MG/1
300 CAPSULE ORAL 3 TIMES DAILY
Qty: 90 CAPSULE | Refills: 3 | Status: SHIPPED | OUTPATIENT
Start: 2017-04-20 | End: 2017-05-15 | Stop reason: SDUPTHER

## 2017-04-20 NOTE — MR AVS SNAPSHOT
[facility]  [address]  [place] [address]  Phone: [phone]                  Marisol Payan   2017 1:00 PM   Office Visit    Description:  Female : 1950   Provider:  Golden Weaver MD   Department:  [facility]           Reason for Visit     Breast Pain           Diagnoses this Visit        Comments    Left breast mass    -  Primary     Bilateral carotid artery disease         Chronic left-sided low back pain with left-sided sciatica                To Do List           Future Appointments        Provider Department Dept Phone    2017 9:30 AM [facility] MAMMO2-DX [facility] [phone]    2017 3:30 PM [facility] US3 [facility] [phone]    2017 11:20 AM Devorah Ogden MD [facility] [phone]      Goals (5 Years of Data)     None      Follow-Up and Disposition     Return if symptoms worsen or fail to improve.    Follow-up and Disposition History       These Medications        Disp Refills Start End    gabapentin (NEURONTIN) 300 MG capsule 90 capsule 3 2017     Take 1 capsule (300 mg total) by mouth 3 (three) times daily. - Oral    Pharmacy: [facility]/pharmacy #[id number] - [place] - [address] #: [phone]         [facility]     [facility] Nurse Care Line -  Assistance  Unless otherwise directed by your provider, please contact [facility], our nurse care line that is available for  assistance.     Registered nurses in the [facility] On Call Center provide: appointment scheduling, clinical advisement, health education, and other advisory services.  Call: [phone] (toll free)               Medications           Message regarding Medications     Verify the changes and/or additions to your medication regime listed below are the same as discussed with your clinician today.  If any of these changes or additions are incorrect, please notify  "your healthcare provider.        CHANGE how you are taking these medications     Start Taking Instead of    gabapentin (NEURONTIN) 300 MG capsule gabapentin (NEURONTIN) 300 MG capsule    Dosage:  Take 1 capsule (300 mg total) by mouth 3 (three) times daily. Dosage:  TAKE 1 CAPSULE (300 MG TOTAL) BY MOUTH EVERY EVENING.    Reason for Change:  Reorder       STOP taking these medications     meloxicam (MOBIC) 15 MG tablet TAKE 1 TABLET (15 MG TOTAL) BY MOUTH ONCE DAILY.           Verify that the below list of medications is an accurate representation of the medications you are currently taking.  If none reported, the list may be blank. If incorrect, please contact your healthcare provider. Carry this list with you in case of emergency.           Current Medications     aspirin 81 MG Chew Take 81 mg by mouth once daily.    donepezil (ARICEPT) 10 MG tablet TAKE 1 TABLET (10 MG TOTAL) BY MOUTH EVERY EVENING.    gabapentin (NEURONTIN) 300 MG capsule Take 1 capsule (300 mg total) by mouth 3 (three) times daily.    losartan-hydrochlorothiazide 100-25 mg (HYZAAR) 100-25 mg per tablet TAKE 1 TABLET BY MOUTH ONCE DAILY.    memantine (NAMENDA TITRATION PACK) tablet pack Follow package directions.    omeprazole (PRILOSEC) 20 MG capsule Take 1 capsule (20 mg total) by mouth 2 (two) times daily before meals.    oxybutynin (DITROPAN) 5 MG Tab TAKE 1 TABLET (5 MG TOTAL) BY MOUTH 2 (TWO) TIMES DAILY.    simvastatin (ZOCOR) 20 MG tablet TAKE 1 TABLET (20 MG TOTAL) BY MOUTH EVERY EVENING.    potassium chloride SA (K-DUR,KLOR-CON) 10 MEQ tablet TAKE 1 TABLET (10 MEQ TOTAL) BY MOUTH ONCE DAILY.           Clinical Reference Information           Your Vitals Were     BP Pulse Temp Height    124/72 (BP Location: Left arm, Patient Position: Sitting, BP Method: Manual) 86 97.1 °F (36.2 °C) (Tympanic) 5' 4.75" (1.645 m)    Weight SpO2 BMI    63.8 kg (140 lb 10.5 oz) 96% 23.59 kg/m2      Blood Pressure          Most Recent Value    BP  124/72    "   Allergies as of 4/20/2017     No Known Allergies      Immunizations Administered on Date of Encounter - 4/20/2017     None      Orders Placed During Today's Visit     Future Labs/Procedures Expected by Expires    Mammo Digital Diagnostic Left with CAD  4/20/2017 6/20/2018    US Breast Biopsy with Imaging 1st site Left  4/20/2017 6/20/2018      Language Assistance Services     ATTENTION: Language assistance services are available, free of charge. Please call [phone].      ATENCIÓN: Si habla danielañol, tiene a juarez disposición servicios gratuitos de asistencia lingüística. Llame al [phone].     [facility] Ý: N?u b?n nói Ti?ng Vi?t, có các d?ch v? h? tr? ngôn ng? mi?n phí dành cho b?n. G?i s? [phone].         [facility] complies with applicable Federal civil rights laws and does not discriminate on the basis of race, color, national origin, age, disability, or sex.

## 2017-04-20 NOTE — LETTER
April 20, 2017                 [facility]  Internal Medicine  [address] [place] [address]  [place] [address]  Phone: [phone]   April 20, 2017     Patient: Marisol Payan   YOB: 1950   Date of Visit: 4/20/2017       To Whom it May Concern:    Marisol Payan was seen in my clinic on 4/20/2017. She may return with no restrictions.    If you have any questions or concerns, please don't hesitate to call.    Sincerely,       MD Marina Maguire LPN

## 2017-04-20 NOTE — PROGRESS NOTES
Subjective:      Patient ID: Marisol Payan is a 67 y.o. female.    Chief Complaint: Left breast pain    HPI  Ms. Payan is a patient of Lore Tijerina MD, who presents for left breast/arm pain.    She is accompanied by her daughter, who reports her mother's dementia has been progressively worsening. Daughter reports her mother has been having a 1 month h/o left breast pain in the upper outer quadrant. Last night she came into her daughter's room feeling severe pain in her left breast.    She also has LBP radiating to LLE s/p lumbar disc surgery 2/2 herniated disc. She is taking gabapentin 300 mg 1 tab qhs.    Past Medical History:   Diagnosis Date    Alzheimer's dementia     Anxiety     Back pain     Dementia     Hyperlipidemia     Hypertension     OAB (overactive bladder)     Rheumatoid arthritis     TIA (transient ischemic attack)      Past Surgical History:   Procedure Laterality Date    APPENDECTOMY  1980s    BREAST BIOPSY Right 2005 approx    negative     SECTION  1986    LUMBAR DISC SURGERY  2011    SHOULDER SURGERY Left 2000s    rotator cuff     Social History     Social History    Marital status:      Spouse name: N/A    Number of children: 3    Years of education: N/A     Occupational History    Not on file.     Social History Main Topics    Smoking status: Never Smoker    Smokeless tobacco: Never Used    Alcohol use No    Drug use: No    Sexual activity: No     Other Topics Concern    Not on file     Social History Narrative     Family History   Problem Relation Age of Onset    Hypertension Mother     Pancreatic cancer Mother     Cataracts Mother     Glaucoma Sister     Cancer Neg Hx     Stroke Neg Hx        Current Outpatient Prescriptions:     aspirin 81 MG Chew, Take 81 mg by mouth once daily., Disp: , Rfl:     donepezil (ARICEPT) 10 MG tablet, TAKE 1 TABLET (10 MG TOTAL) BY MOUTH EVERY EVENING., Disp: 30 tablet, Rfl: 3    gabapentin  "(NEURONTIN) 300 MG capsule, Take 1 capsule (300 mg total) by mouth 3 (three) times daily., Disp: 90 capsule, Rfl: 3    losartan-hydrochlorothiazide 100-25 mg (HYZAAR) 100-25 mg per tablet, TAKE 1 TABLET BY MOUTH ONCE DAILY., Disp: 30 tablet, Rfl: 3    memantine (NAMENDA TITRATION PACK) tablet pack, Follow package directions., Disp: 1 tablet, Rfl: 0    omeprazole (PRILOSEC) 20 MG capsule, Take 1 capsule (20 mg total) by mouth 2 (two) times daily before meals., Disp: 60 capsule, Rfl: 1    oxybutynin (DITROPAN) 5 MG Tab, TAKE 1 TABLET (5 MG TOTAL) BY MOUTH 2 (TWO) TIMES DAILY., Disp: 60 tablet, Rfl: 3    simvastatin (ZOCOR) 20 MG tablet, TAKE 1 TABLET (20 MG TOTAL) BY MOUTH EVERY EVENING., Disp: 30 tablet, Rfl: 3    potassium chloride SA (K-DUR,KLOR-CON) 10 MEQ tablet, TAKE 1 TABLET (10 MEQ TOTAL) BY MOUTH ONCE DAILY., Disp: 30 tablet, Rfl: 2    Review of patient's allergies indicates:  No Known Allergies    Lab Results   Component Value Date    WBC 6.84 01/24/2017    HGB 14.1 01/24/2017    HCT 41.9 01/24/2017     01/24/2017    CHOL 179 06/14/2016    TRIG 92 06/14/2016    HDL 63 06/14/2016    ALT 14 02/02/2017    AST 17 02/02/2017     02/02/2017    K 3.6 02/02/2017     02/02/2017    CREATININE 0.8 02/02/2017    BUN 12 02/02/2017    CO2 29 02/02/2017    TSH 1.495 11/05/2015     /72 (BP Location: Left arm, Patient Position: Sitting, BP Method: Manual)  Pulse 86  Temp 97.1 °F (36.2 °C) (Tympanic)   Ht 5' 4.75" (1.645 m)  Wt 63.8 kg (140 lb 10.5 oz)  SpO2 96%  BMI 23.59 kg/m2    Review of Systems   Constitutional: Negative.   Cardiovascular: Negative.   Respiratory: Negative.   Gastrointestinal: Negative.   Genitourinary: Negative.   Musculoskeletal: Negative.   Derm: Negative.  Allergic/Immunologic: Negative.   Endocrine: Negative.   Hematological: Negative.   Neurological: Negative.   Psychiatric/Behavioral: Progressive dementia as in HPI.     Objective:     Physical Exam  GEN: A&O x " 3 with prompting, NAD  PSYC: Normal affect  CV: RRR, no M/G/R  PULM: CTA bilaterally, no wheezes, rales  EXT: No C/C/E  NEURO: CN II-XII intact, 5/5 strength globally, no sensory losses, normal tandem gait, normal Romberg, 2+ DTRs globally  BREAST: No axillary LAD, 0.5 x 1 cm breast mass in left breast: outer upper quadrant, 8 cm from areoa, no nipple discharge  MSK: Point tenderness at L3-L4 level      Assessment:      1. Left breast mass: Worrisome for breast cancer. 11 lb wt loss since 2/2017.   2. Bilateral carotid artery disease: L>R per last u/s.    3. Chronic left-sided low back pain with left-sided sciatica.     Plan:   Left breast mass  -     Mammo Digital Diagnostic Left with CAD; Future; Expected date: 4/20/17  -     US Breast Biopsy with Imaging 1st site Left; Future; Expected date: 4/20/17    Bilateral carotid artery disease    Chronic left-sided low back pain with left-sided sciatica  -     gabapentin (NEURONTIN) 300 MG capsule; Take 1 capsule (300 mg total) by mouth 3 (three) times daily.  Dispense: 90 capsule; Refill: 3    RTC prn

## 2017-04-21 ENCOUNTER — HOSPITAL ENCOUNTER (OUTPATIENT)
Dept: RADIOLOGY | Facility: HOSPITAL | Age: 67
Discharge: HOME | End: 2017-04-21
Attending: INTERNAL MEDICINE
Payer: MEDICARE

## 2017-04-21 ENCOUNTER — TELEPHONE (OUTPATIENT)
Dept: NEUROLOGY | Facility: CLINIC | Age: 67
End: 2017-04-21

## 2017-04-21 DIAGNOSIS — N63.20 LEFT BREAST MASS: ICD-10-CM

## 2017-04-21 PROCEDURE — 77066 DX MAMMO INCL CAD BI: CPT | Mod: TC

## 2017-04-21 PROCEDURE — 76642 ULTRASOUND BREAST LIMITED: CPT | Mod: 26,LT,, | Performed by: RADIOLOGY

## 2017-04-21 PROCEDURE — 77066 DX MAMMO INCL CAD BI: CPT | Mod: 26,,, | Performed by: RADIOLOGY

## 2017-04-21 PROCEDURE — 77062 BREAST TOMOSYNTHESIS BI: CPT | Mod: 26,,, | Performed by: RADIOLOGY

## 2017-04-21 PROCEDURE — 76642 ULTRASOUND BREAST LIMITED: CPT | Mod: TC,PO,LT

## 2017-04-21 RX ORDER — MEMANTINE HYDROCHLORIDE 10 MG/1
10 TABLET ORAL 2 TIMES DAILY
Qty: 60 TABLET | Refills: 11 | Status: SHIPPED | OUTPATIENT
Start: 2017-04-21 | End: 2018-03-06 | Stop reason: SDUPTHER

## 2017-04-24 ENCOUNTER — DOCUMENTATION ONLY (OUTPATIENT)
Dept: RADIOLOGY | Facility: HOSPITAL | Age: 67
End: 2017-04-24

## 2017-04-24 NOTE — PROGRESS NOTES
Breast Care Management Follow-Up:    Date of Mammogram:04/21/17    Mammogram Reason:Pain in left breast    Mammogram Results:and Left U/S - no abnormality to correlate with the breast pain. Superficial sebaceous cyst in the left breast.      Referrals/Recommendations:Annual mammo and clinical f/u        Patient Status:04/24/17 Results letter and report mailed to pt.

## 2017-05-15 DIAGNOSIS — I10 ESSENTIAL HYPERTENSION: ICD-10-CM

## 2017-05-15 DIAGNOSIS — N32.81 OAB (OVERACTIVE BLADDER): ICD-10-CM

## 2017-05-15 DIAGNOSIS — M54.9 LEFT-SIDED BACK PAIN: ICD-10-CM

## 2017-05-15 RX ORDER — GABAPENTIN 300 MG/1
CAPSULE ORAL
Qty: 30 CAPSULE | Refills: 1 | Status: SHIPPED | OUTPATIENT
Start: 2017-05-15 | End: 2017-12-06 | Stop reason: SDUPTHER

## 2017-05-15 RX ORDER — OXYBUTYNIN CHLORIDE 5 MG/1
TABLET ORAL
Qty: 60 TABLET | Refills: 1 | Status: SHIPPED | OUTPATIENT
Start: 2017-05-15 | End: 2017-10-12 | Stop reason: SDUPTHER

## 2017-05-15 RX ORDER — SIMVASTATIN 20 MG/1
TABLET, FILM COATED ORAL
Qty: 30 TABLET | Refills: 3 | Status: SHIPPED | OUTPATIENT
Start: 2017-05-15 | End: 2017-11-20 | Stop reason: SDUPTHER

## 2017-05-15 RX ORDER — LOSARTAN POTASSIUM AND HYDROCHLOROTHIAZIDE 25; 100 MG/1; MG/1
TABLET ORAL
Qty: 30 TABLET | Refills: 1 | Status: SHIPPED | OUTPATIENT
Start: 2017-05-15 | End: 2017-08-22 | Stop reason: SDUPTHER

## 2017-07-23 DIAGNOSIS — M54.9 LEFT-SIDED BACK PAIN: ICD-10-CM

## 2017-07-24 RX ORDER — GABAPENTIN 300 MG/1
CAPSULE ORAL
Qty: 30 CAPSULE | Refills: 0 | OUTPATIENT
Start: 2017-07-24

## 2017-08-22 ENCOUNTER — PATIENT MESSAGE (OUTPATIENT)
Dept: INTERNAL MEDICINE | Facility: CLINIC | Age: 67
End: 2017-08-22

## 2017-08-22 DIAGNOSIS — I10 ESSENTIAL HYPERTENSION: ICD-10-CM

## 2017-08-22 RX ORDER — LOSARTAN POTASSIUM AND HYDROCHLOROTHIAZIDE 25; 100 MG/1; MG/1
1 TABLET ORAL DAILY
Qty: 30 TABLET | Refills: 1 | Status: SHIPPED | OUTPATIENT
Start: 2017-08-22 | End: 2017-11-20 | Stop reason: SDUPTHER

## 2017-08-22 NOTE — TELEPHONE ENCOUNTER
Pt is requesting a 30 day refill on medication. Pt hasn't been seen since 06/2016. I told pt that she will need an appt. Pt states she will call to schedule. Please advise

## 2017-09-02 DIAGNOSIS — B96.81 HELICOBACTER PYLORI GASTRITIS: ICD-10-CM

## 2017-09-02 DIAGNOSIS — K29.70 HELICOBACTER PYLORI GASTRITIS: ICD-10-CM

## 2017-09-05 RX ORDER — OMEPRAZOLE 20 MG/1
20 CAPSULE, DELAYED RELEASE ORAL
Qty: 60 CAPSULE | Refills: 1 | Status: SHIPPED | OUTPATIENT
Start: 2017-09-05 | End: 2018-03-06 | Stop reason: SDUPTHER

## 2017-09-30 ENCOUNTER — PATIENT MESSAGE (OUTPATIENT)
Dept: INTERNAL MEDICINE | Facility: CLINIC | Age: 67
End: 2017-09-30

## 2017-10-09 DIAGNOSIS — Z78.0 ASYMPTOMATIC MENOPAUSAL STATE: Primary | ICD-10-CM

## 2017-10-10 ENCOUNTER — DOCUMENTATION ONLY (OUTPATIENT)
Dept: FAMILY MEDICINE | Facility: CLINIC | Age: 67
End: 2017-10-10

## 2017-10-10 NOTE — PROGRESS NOTES
Pre-Visit Chart Review  For Appointment Scheduled on (date) 10/12/17    Health Maintenance Due   Topic Date Due    TETANUS VACCINE  01/24/1967    DEXA SCAN  01/24/1989    Colonoscopy  01/24/1999    Zoster Vaccine  01/24/2009    Pneumococcal (65+) (1 of 2 - PCV13) 01/24/2014    Lipid Panel  06/14/2017    Influenza Vaccine  08/01/2017

## 2017-10-12 ENCOUNTER — OFFICE VISIT (OUTPATIENT)
Dept: FAMILY MEDICINE | Facility: CLINIC | Age: 67
End: 2017-10-12
Payer: MEDICARE

## 2017-10-12 VITALS
SYSTOLIC BLOOD PRESSURE: 121 MMHG | WEIGHT: 146.63 LBS | HEART RATE: 66 BPM | DIASTOLIC BLOOD PRESSURE: 84 MMHG | BODY MASS INDEX: 24.43 KG/M2 | HEIGHT: 65 IN

## 2017-10-12 DIAGNOSIS — E78.5 HYPERLIPIDEMIA, UNSPECIFIED HYPERLIPIDEMIA TYPE: Chronic | ICD-10-CM

## 2017-10-12 DIAGNOSIS — N32.81 OAB (OVERACTIVE BLADDER): ICD-10-CM

## 2017-10-12 DIAGNOSIS — I10 ESSENTIAL HYPERTENSION: Chronic | ICD-10-CM

## 2017-10-12 DIAGNOSIS — Z76.89 ESTABLISHING CARE WITH NEW DOCTOR, ENCOUNTER FOR: Primary | ICD-10-CM

## 2017-10-12 DIAGNOSIS — F01.50 VASCULAR DEMENTIA WITHOUT BEHAVIORAL DISTURBANCE: ICD-10-CM

## 2017-10-12 DIAGNOSIS — F41.9 ANXIETY: ICD-10-CM

## 2017-10-12 PROBLEM — R10.13 EPIGASTRIC ABDOMINAL PAIN: Status: RESOLVED | Noted: 2017-02-20 | Resolved: 2017-10-12

## 2017-10-12 PROCEDURE — 99214 OFFICE O/P EST MOD 30 MIN: CPT | Mod: S$GLB,,, | Performed by: FAMILY MEDICINE

## 2017-10-12 PROCEDURE — 99499 UNLISTED E&M SERVICE: CPT | Mod: S$GLB,,, | Performed by: FAMILY MEDICINE

## 2017-10-12 PROCEDURE — 99999 PR PBB SHADOW E&M-EST. PATIENT-LVL III: CPT | Mod: PBBFAC,,, | Performed by: FAMILY MEDICINE

## 2017-10-12 RX ORDER — OXYBUTYNIN CHLORIDE 5 MG/1
5 TABLET ORAL 2 TIMES DAILY
Qty: 60 TABLET | Refills: 5 | Status: SHIPPED | OUTPATIENT
Start: 2017-10-12 | End: 2018-03-06 | Stop reason: SDUPTHER

## 2017-10-12 NOTE — PROGRESS NOTES
Subjective:       Patient ID: Marisol Payan is a 67 y.o. female.    Chief Complaint: Establish Care (need labs (potassium))    HPI     Establish Care  Pt reports to the clinic to establish care.   The pt has a medical history which includes anxiety, HLD and HTN.  As far as smoking is concerned, the pt denies.   The pt attempts to maintain a healthy diet and engages in regular exercise.   Consistent seatbelt usage reported.   Health maintenance addressed and updated in chart.    Review of Systems   Constitutional: Negative for chills and fever.   HENT: Negative for sore throat.    Eyes: Negative for visual disturbance.   Respiratory: Negative for cough and shortness of breath.    Cardiovascular: Negative for chest pain and leg swelling.   Gastrointestinal: Negative for abdominal pain, blood in stool, constipation, diarrhea and vomiting.   Genitourinary: Negative for difficulty urinating, dysuria and hematuria.   Musculoskeletal: Negative for arthralgias.   Neurological: Positive for headaches. Negative for dizziness and weakness.       Objective:      Physical Exam   Constitutional: She appears well-developed and well-nourished. No distress.   HENT:   Head: Normocephalic and atraumatic.   Mouth/Throat: Oropharynx is clear and moist. No oropharyngeal exudate.   Eyes: EOM are normal. Pupils are equal, round, and reactive to light.   Neck: Normal range of motion. Neck supple. No thyromegaly present.   Cardiovascular: Normal rate, regular rhythm, normal heart sounds and intact distal pulses.    Pulmonary/Chest: Effort normal and breath sounds normal. No respiratory distress. She has no wheezes.   Abdominal: Soft. Bowel sounds are normal. She exhibits no distension and no mass. There is no tenderness.   Musculoskeletal: She exhibits no edema.   Lymphadenopathy:     She has no cervical adenopathy.   Neurological: She is alert.   Skin: Skin is warm. No rash noted. No erythema.   Psychiatric: She has a normal mood  and affect. Her behavior is normal.   Vitals reviewed.      Assessment:       1. Establishing care with new doctor, encounter for    2. OAB (overactive bladder)    3. Vascular dementia without behavioral disturbance    4. Anxiety    5. Hyperlipidemia, unspecified hyperlipidemia type    6. Essential hypertension        Plan:       1. Establishing care with new doctor, encounter for  Patient has been advised to continue to maintain a healthy lifestyle, including regular exercise and consuming a well balanced diet.     2. OAB (overactive bladder)  Condition currently stable. No changes to medication regimen on today.   - oxybutynin (DITROPAN) 5 MG Tab; Take 1 tablet (5 mg total) by mouth 2 (two) times daily.  Dispense: 60 tablet; Refill: 5    3. Vascular dementia without behavioral disturbance  - Ambulatory referral to Neurology    4. Anxiety  Condition currently stable. No changes to medication regimen on today.     5. Hyperlipidemia, unspecified hyperlipidemia type  The 10-year ASCVD risk score (Dane ESTRADA Jr., et al., 2013) is: 8.7%    Values used to calculate the score:      Age: 67 years      Sex: Female      Is Non- : Yes      Diabetic: No      Tobacco smoker: No      Systolic Blood Pressure: 121 mmHg      Is BP treated: Yes      HDL Cholesterol: 63 mg/dL      Total Cholesterol: 179 mg/dL  - Lipid panel; Future  Continue Zocor.     6. Essential hypertension  Condition currently stable. No changes to medication regimen on today.   - Basic metabolic panel; Future  - Lipid panel; Future  - Microalbumin/creatinine urine ratio; Future    Patient readiness: acceptance and barriers:none    During the course of the visit the patient was educated and counseled about the following:     Hypertension:   Dietary sodium restriction.  Regular aerobic exercise.    Goals: Hypertension: Reduce Blood Pressure    Did patient meet goals/outcomes: no    The following self management tools provided: blood pressure  log  excercise log    Patient Instructions (the written plan) was given to the patient/family.     Time spent with patient: 15 minutes    Portions of this note were created using Dragon voice recognition software. There may be voice recognition errors found in the text, and attempts were made to correct these errors prior to signature    Tee Tobar MD    Family Medicine  10/12/2017

## 2017-11-20 DIAGNOSIS — I10 ESSENTIAL HYPERTENSION: ICD-10-CM

## 2017-11-20 RX ORDER — LOSARTAN POTASSIUM AND HYDROCHLOROTHIAZIDE 25; 100 MG/1; MG/1
1 TABLET ORAL DAILY
Qty: 30 TABLET | Refills: 0 | Status: SHIPPED | OUTPATIENT
Start: 2017-11-20 | End: 2017-12-27 | Stop reason: SDUPTHER

## 2017-11-20 RX ORDER — SIMVASTATIN 20 MG/1
TABLET, FILM COATED ORAL
Qty: 30 TABLET | Refills: 0 | Status: SHIPPED | OUTPATIENT
Start: 2017-11-20 | End: 2017-12-27 | Stop reason: SDUPTHER

## 2017-12-06 ENCOUNTER — TELEPHONE (OUTPATIENT)
Dept: INTERNAL MEDICINE | Facility: CLINIC | Age: 67
End: 2017-12-06

## 2017-12-07 RX ORDER — GABAPENTIN 300 MG/1
300 CAPSULE ORAL NIGHTLY
Qty: 90 CAPSULE | Refills: 3 | Status: SHIPPED | OUTPATIENT
Start: 2017-12-07 | End: 2018-03-06 | Stop reason: SDUPTHER

## 2017-12-27 DIAGNOSIS — I10 ESSENTIAL HYPERTENSION: ICD-10-CM

## 2017-12-27 RX ORDER — SIMVASTATIN 20 MG/1
TABLET, FILM COATED ORAL
Qty: 30 TABLET | Refills: 0 | Status: SHIPPED | OUTPATIENT
Start: 2017-12-27 | End: 2018-03-06 | Stop reason: SDUPTHER

## 2017-12-27 RX ORDER — LOSARTAN POTASSIUM AND HYDROCHLOROTHIAZIDE 25; 100 MG/1; MG/1
1 TABLET ORAL DAILY
Qty: 30 TABLET | Refills: 0 | Status: SHIPPED | OUTPATIENT
Start: 2017-12-27 | End: 2018-03-06 | Stop reason: SDUPTHER

## 2018-02-06 DIAGNOSIS — I10 ESSENTIAL HYPERTENSION: ICD-10-CM

## 2018-02-06 RX ORDER — GABAPENTIN 300 MG/1
CAPSULE ORAL
Qty: 90 CAPSULE | Refills: 0 | OUTPATIENT
Start: 2018-02-06

## 2018-02-06 RX ORDER — LOSARTAN POTASSIUM AND HYDROCHLOROTHIAZIDE 25; 100 MG/1; MG/1
1 TABLET ORAL DAILY
Qty: 30 TABLET | Refills: 0 | OUTPATIENT
Start: 2018-02-06

## 2018-03-06 ENCOUNTER — DOCUMENTATION ONLY (OUTPATIENT)
Dept: FAMILY MEDICINE | Facility: CLINIC | Age: 68
End: 2018-03-06

## 2018-03-06 ENCOUNTER — OFFICE VISIT (OUTPATIENT)
Dept: FAMILY MEDICINE | Facility: CLINIC | Age: 68
End: 2018-03-06
Payer: MEDICARE

## 2018-03-06 VITALS
SYSTOLIC BLOOD PRESSURE: 168 MMHG | WEIGHT: 148.38 LBS | BODY MASS INDEX: 24.72 KG/M2 | HEART RATE: 71 BPM | HEIGHT: 65 IN | TEMPERATURE: 98 F | OXYGEN SATURATION: 98 % | DIASTOLIC BLOOD PRESSURE: 90 MMHG

## 2018-03-06 DIAGNOSIS — F02.80 ALZHEIMER'S DEMENTIA WITHOUT BEHAVIORAL DISTURBANCE, UNSPECIFIED TIMING OF DEMENTIA ONSET: ICD-10-CM

## 2018-03-06 DIAGNOSIS — I25.10 CORONARY ARTERY DISEASE, ANGINA PRESENCE UNSPECIFIED, UNSPECIFIED VESSEL OR LESION TYPE, UNSPECIFIED WHETHER NATIVE OR TRANSPLANTED HEART: Primary | ICD-10-CM

## 2018-03-06 DIAGNOSIS — M85.9 DISORDER OF BONE DENSITY AND STRUCTURE, UNSPECIFIED: ICD-10-CM

## 2018-03-06 DIAGNOSIS — I10 ESSENTIAL HYPERTENSION: ICD-10-CM

## 2018-03-06 DIAGNOSIS — Z78.0 POST-MENOPAUSE: ICD-10-CM

## 2018-03-06 DIAGNOSIS — N32.81 OAB (OVERACTIVE BLADDER): ICD-10-CM

## 2018-03-06 DIAGNOSIS — E78.5 HYPERLIPIDEMIA, UNSPECIFIED HYPERLIPIDEMIA TYPE: ICD-10-CM

## 2018-03-06 DIAGNOSIS — K29.70 HELICOBACTER PYLORI GASTRITIS: ICD-10-CM

## 2018-03-06 DIAGNOSIS — G30.9 ALZHEIMER'S DEMENTIA WITHOUT BEHAVIORAL DISTURBANCE, UNSPECIFIED TIMING OF DEMENTIA ONSET: ICD-10-CM

## 2018-03-06 DIAGNOSIS — I10 HYPERTENSION, UNSPECIFIED TYPE: ICD-10-CM

## 2018-03-06 DIAGNOSIS — B96.81 HELICOBACTER PYLORI GASTRITIS: ICD-10-CM

## 2018-03-06 DIAGNOSIS — R63.4 WEIGHT LOSS: ICD-10-CM

## 2018-03-06 PROCEDURE — 3080F DIAST BP >= 90 MM HG: CPT | Mod: S$GLB,,, | Performed by: PHYSICIAN ASSISTANT

## 2018-03-06 PROCEDURE — 99499 UNLISTED E&M SERVICE: CPT | Mod: S$GLB,,, | Performed by: PHYSICIAN ASSISTANT

## 2018-03-06 PROCEDURE — 99999 PR PBB SHADOW E&M-EST. PATIENT-LVL III: CPT | Mod: PBBFAC,,, | Performed by: PHYSICIAN ASSISTANT

## 2018-03-06 PROCEDURE — 99214 OFFICE O/P EST MOD 30 MIN: CPT | Mod: S$GLB,,, | Performed by: PHYSICIAN ASSISTANT

## 2018-03-06 PROCEDURE — 3077F SYST BP >= 140 MM HG: CPT | Mod: S$GLB,,, | Performed by: PHYSICIAN ASSISTANT

## 2018-03-06 RX ORDER — SIMVASTATIN 20 MG/1
TABLET, FILM COATED ORAL
Qty: 30 TABLET | Refills: 0 | OUTPATIENT
Start: 2018-03-06

## 2018-03-06 RX ORDER — SIMVASTATIN 20 MG/1
20 TABLET, FILM COATED ORAL NIGHTLY
Qty: 90 TABLET | Refills: 3 | Status: SHIPPED | OUTPATIENT
Start: 2018-03-06 | End: 2019-01-29

## 2018-03-06 RX ORDER — OMEPRAZOLE 20 MG/1
20 CAPSULE, DELAYED RELEASE ORAL
Qty: 180 CAPSULE | Refills: 3 | Status: SHIPPED | OUTPATIENT
Start: 2018-03-06 | End: 2018-05-01

## 2018-03-06 RX ORDER — GABAPENTIN 300 MG/1
300 CAPSULE ORAL NIGHTLY
Qty: 90 CAPSULE | Refills: 3 | Status: SHIPPED | OUTPATIENT
Start: 2018-03-06

## 2018-03-06 RX ORDER — LOSARTAN POTASSIUM AND HYDROCHLOROTHIAZIDE 25; 100 MG/1; MG/1
1 TABLET ORAL DAILY
Qty: 90 TABLET | Refills: 3 | Status: SHIPPED | OUTPATIENT
Start: 2018-03-06 | End: 2019-03-10 | Stop reason: SDUPTHER

## 2018-03-06 RX ORDER — OXYBUTYNIN CHLORIDE 5 MG/1
5 TABLET ORAL 2 TIMES DAILY
Qty: 180 TABLET | Refills: 3 | Status: SHIPPED | OUTPATIENT
Start: 2018-03-06 | End: 2018-05-01

## 2018-03-06 RX ORDER — MEMANTINE HYDROCHLORIDE 10 MG/1
10 TABLET ORAL 2 TIMES DAILY
Qty: 180 TABLET | Refills: 3 | Status: SHIPPED | OUTPATIENT
Start: 2018-03-06 | End: 2019-01-29

## 2018-03-07 ENCOUNTER — LAB VISIT (OUTPATIENT)
Dept: LAB | Facility: HOSPITAL | Age: 68
End: 2018-03-07
Attending: PHYSICIAN ASSISTANT
Payer: MEDICARE

## 2018-03-07 DIAGNOSIS — M85.9 DISORDER OF BONE DENSITY AND STRUCTURE, UNSPECIFIED: ICD-10-CM

## 2018-03-07 DIAGNOSIS — I25.10 CORONARY ARTERY DISEASE, ANGINA PRESENCE UNSPECIFIED, UNSPECIFIED VESSEL OR LESION TYPE, UNSPECIFIED WHETHER NATIVE OR TRANSPLANTED HEART: ICD-10-CM

## 2018-03-07 DIAGNOSIS — E78.5 HYPERLIPIDEMIA, UNSPECIFIED HYPERLIPIDEMIA TYPE: ICD-10-CM

## 2018-03-07 DIAGNOSIS — I10 HYPERTENSION, UNSPECIFIED TYPE: ICD-10-CM

## 2018-03-07 DIAGNOSIS — R63.4 WEIGHT LOSS: ICD-10-CM

## 2018-03-07 DIAGNOSIS — Z78.0 POST-MENOPAUSE: ICD-10-CM

## 2018-03-07 LAB
25(OH)D3+25(OH)D2 SERPL-MCNC: 25 NG/ML
ALBUMIN SERPL BCP-MCNC: 4.1 G/DL
ALP SERPL-CCNC: 73 U/L
ALT SERPL W/O P-5'-P-CCNC: 17 U/L
ANION GAP SERPL CALC-SCNC: 7 MMOL/L
AST SERPL-CCNC: 18 U/L
BASOPHILS # BLD AUTO: 0.03 K/UL
BASOPHILS NFR BLD: 0.5 %
BILIRUB SERPL-MCNC: 0.7 MG/DL
BUN SERPL-MCNC: 8 MG/DL
CALCIUM SERPL-MCNC: 10.4 MG/DL
CHLORIDE SERPL-SCNC: 108 MMOL/L
CHOLEST SERPL-MCNC: 198 MG/DL
CHOLEST/HDLC SERPL: 2.9 {RATIO}
CO2 SERPL-SCNC: 27 MMOL/L
CREAT SERPL-MCNC: 0.8 MG/DL
DIFFERENTIAL METHOD: ABNORMAL
EOSINOPHIL # BLD AUTO: 0.1 K/UL
EOSINOPHIL NFR BLD: 1 %
ERYTHROCYTE [DISTWIDTH] IN BLOOD BY AUTOMATED COUNT: 12 %
EST. GFR  (AFRICAN AMERICAN): >60 ML/MIN/1.73 M^2
EST. GFR  (NON AFRICAN AMERICAN): >60 ML/MIN/1.73 M^2
GLUCOSE SERPL-MCNC: 100 MG/DL
HCT VFR BLD AUTO: 40.6 %
HDLC SERPL-MCNC: 69 MG/DL
HDLC SERPL: 34.8 %
HGB BLD-MCNC: 12.8 G/DL
IMM GRANULOCYTES # BLD AUTO: 0.01 K/UL
IMM GRANULOCYTES NFR BLD AUTO: 0.2 %
LDLC SERPL CALC-MCNC: 113.6 MG/DL
LYMPHOCYTES # BLD AUTO: 1.7 K/UL
LYMPHOCYTES NFR BLD: 29.8 %
MCH RBC QN AUTO: 28.8 PG
MCHC RBC AUTO-ENTMCNC: 31.5 G/DL
MCV RBC AUTO: 91 FL
MONOCYTES # BLD AUTO: 0.4 K/UL
MONOCYTES NFR BLD: 6.3 %
NEUTROPHILS # BLD AUTO: 3.6 K/UL
NEUTROPHILS NFR BLD: 62.2 %
NONHDLC SERPL-MCNC: 129 MG/DL
NRBC BLD-RTO: 0 /100 WBC
PLATELET # BLD AUTO: 212 K/UL
PMV BLD AUTO: 10.9 FL
POTASSIUM SERPL-SCNC: 3.7 MMOL/L
PROT SERPL-MCNC: 7.9 G/DL
RBC # BLD AUTO: 4.45 M/UL
SODIUM SERPL-SCNC: 142 MMOL/L
TRIGL SERPL-MCNC: 77 MG/DL
TSH SERPL DL<=0.005 MIU/L-ACNC: 1.39 UIU/ML
WBC # BLD AUTO: 5.74 K/UL

## 2018-03-07 PROCEDURE — 82306 VITAMIN D 25 HYDROXY: CPT

## 2018-03-07 PROCEDURE — 84443 ASSAY THYROID STIM HORMONE: CPT

## 2018-03-07 PROCEDURE — 36415 COLL VENOUS BLD VENIPUNCTURE: CPT | Mod: PO

## 2018-03-07 PROCEDURE — 85025 COMPLETE CBC W/AUTO DIFF WBC: CPT

## 2018-03-07 PROCEDURE — 80053 COMPREHEN METABOLIC PANEL: CPT

## 2018-03-07 PROCEDURE — 80061 LIPID PANEL: CPT

## 2018-03-07 NOTE — PROGRESS NOTES
"Subjective:       Patient ID: Marisol Payan is a 68 y.o. female.    Chief Complaint: Medication Refill    HPI   Patient is a 68 year old AA female with dementia presenting to the clinic with her daughter for medication refills. She has been out of her blood pressure medication for several weeks. They were concerned that Dr. Tobar was not filling this for them when it was requested via the pharmacy, but we explained to them that the rx was being sent to her old PCP- Dr. Tijerina. The patient does become somewhat irritated in clinic today, but her daughter explains this is most likely related to it being later in the afternoon and potential "sun downers."   Review of Systems   Unable to perform ROS: Dementia       Objective:      Physical Exam   Constitutional: Vital signs are normal. She appears well-developed and well-nourished. No distress.   Cardiovascular: Normal rate, regular rhythm, S1 normal, S2 normal and normal heart sounds.  Exam reveals no gallop.    No murmur heard.  Pulses:       Radial pulses are 2+ on the right side, and 2+ on the left side.   <2sec cap refill fingers bilat     Pulmonary/Chest: Effort normal and breath sounds normal. No respiratory distress. She has no wheezes. She has no rhonchi.   Skin: Skin is warm and dry. She is not diaphoretic.   Appropriate skin turgor   Psychiatric: She has a normal mood and affect. Her speech is normal and behavior is normal. Judgment and thought content normal. Cognition and memory are normal.       Assessment:       1. Coronary artery disease, angina presence unspecified, unspecified vessel or lesion type, unspecified whether native or transplanted heart    2. Hypertension, unspecified type    3. Hyperlipidemia, unspecified hyperlipidemia type    4. Weight loss    5. Disorder of bone density and structure, unspecified     6. Post-menopause    7. Helicobacter pylori gastritis    8. Essential hypertension    9. OAB (overactive bladder)    10. " Alzheimer's dementia without behavioral disturbance, unspecified timing of dementia onset        Plan:       Marisol was seen today for medication refill.    Diagnoses and all orders for this visit:    Hypertension, unspecified type  Not at goal; restart BP medications since she has been out2  2 week f/u extended apt scheduled.  -     Comprehensive metabolic panel; Future  -     Lipid panel; Future  -     TSH; Future    Hyperlipidemia, unspecified hyperlipidemia type  -     Comprehensive metabolic panel; Future  -     Lipid panel; Future    Coronary artery disease, angina presence unspecified, unspecified vessel or lesion type, unspecified whether native or transplanted heart  -     Lipid panel; Future    Weight loss  -     CBC auto differential; Future  -     TSH; Future  -     Vitamin D; Future    Post-menopause  -     Vitamin D; Future    Disorder of bone density and structure, unspecified   -     Vitamin D; Future    Essential hypertension  -     losartan-hydrochlorothiazide 100-25 mg (HYZAAR) 100-25 mg per tablet; Take 1 tablet by mouth once daily.    Helicobacter pylori gastritis  -     omeprazole (PRILOSEC) 20 MG capsule; Take 1 capsule (20 mg total) by mouth 2 (two) times daily before meals.    OAB (overactive bladder)  -     oxybutynin (DITROPAN) 5 MG Tab; Take 1 tablet (5 mg total) by mouth 2 (two) times daily.    Alzheimer's dementia without behavioral disturbance, unspecified timing of dementia onset  -     Ambulatory referral to Home Health    Other orders  -     gabapentin (NEURONTIN) 300 MG capsule; Take 1 capsule (300 mg total) by mouth every evening.  -     memantine (NAMENDA) 10 MG Tab; Take 1 tablet (10 mg total) by mouth 2 (two) times daily.  -     simvastatin (ZOCOR) 20 MG tablet; Take 1 tablet (20 mg total) by mouth every evening.    Patient readiness: acceptance and barriers:none    During the course of the visit the patient was educated and counseled about the following:     Hypertension:    Medication: see above.    Goals: Hypertension: Reduce Blood Pressure    Did patient meet goals/outcomes: No    The following self management tools provided: declined    Patient Instructions (the written plan) was given to the patient/family.     Time spent with patient: 30 minutes    Barriers to medications present (no )    Adverse reactions to current medications (no)    Over the counter medications reviewed (Yes)

## 2018-03-14 ENCOUNTER — HOSPITAL ENCOUNTER (OUTPATIENT)
Dept: RADIOLOGY | Facility: CLINIC | Age: 68
Discharge: HOME | End: 2018-03-14
Attending: FAMILY MEDICINE
Payer: MEDICARE

## 2018-03-14 DIAGNOSIS — Z78.0 ASYMPTOMATIC MENOPAUSAL STATE: ICD-10-CM

## 2018-03-14 PROCEDURE — 77080 DXA BONE DENSITY AXIAL: CPT | Mod: TC,PO

## 2018-03-14 PROCEDURE — 77080 DXA BONE DENSITY AXIAL: CPT | Mod: 26,,, | Performed by: RADIOLOGY

## 2018-03-16 ENCOUNTER — TELEPHONE (OUTPATIENT)
Dept: FAMILY MEDICINE | Facility: CLINIC | Age: 68
End: 2018-03-16

## 2018-03-16 RX ORDER — LYSINE HCL 500 MG
1 TABLET ORAL DAILY
Qty: 30 TABLET | Refills: 11 | Status: SHIPPED | OUTPATIENT
Start: 2018-03-16 | End: 2018-05-01

## 2018-03-16 NOTE — TELEPHONE ENCOUNTER
Unsuccessful attempt to contact patient regarding her recent lab results.  I left a message indicating osteopenia noted on DEXA scan.  She has been started on Caltrate for this.

## 2018-03-27 ENCOUNTER — PES CALL (OUTPATIENT)
Dept: ADMINISTRATIVE | Facility: CLINIC | Age: 68
End: 2018-03-27

## 2018-04-09 DIAGNOSIS — Z12.11 COLON CANCER SCREENING: Primary | ICD-10-CM

## 2018-04-10 ENCOUNTER — HOSPITAL ENCOUNTER (EMERGENCY)
Facility: HOSPITAL | Age: 68
Discharge: OTHER INSTITUTION | End: 2018-04-10
Attending: EMERGENCY MEDICINE
Payer: MEDICARE

## 2018-04-10 VITALS
HEART RATE: 85 BPM | DIASTOLIC BLOOD PRESSURE: 74 MMHG | BODY MASS INDEX: 25.61 KG/M2 | SYSTOLIC BLOOD PRESSURE: 155 MMHG | OXYGEN SATURATION: 100 % | TEMPERATURE: 99 F | WEIGHT: 150 LBS | RESPIRATION RATE: 20 BRPM | HEIGHT: 64 IN

## 2018-04-10 DIAGNOSIS — F03.92 DEMENTIA WITH PSYCHOSIS: Primary | ICD-10-CM

## 2018-04-10 LAB
ALBUMIN SERPL BCP-MCNC: 3.7 G/DL
ALP SERPL-CCNC: 62 U/L
ALT SERPL W/O P-5'-P-CCNC: 15 U/L
AMORPH CRY URNS QL MICRO: ABNORMAL
ANION GAP SERPL CALC-SCNC: 10 MMOL/L
AST SERPL-CCNC: 16 U/L
BACTERIA #/AREA URNS HPF: ABNORMAL /HPF
BASOPHILS # BLD AUTO: 0 K/UL
BASOPHILS NFR BLD: 0.4 %
BILIRUB SERPL-MCNC: 0.3 MG/DL
BILIRUB UR QL STRIP: NEGATIVE
BUN SERPL-MCNC: 13 MG/DL
CALCIUM SERPL-MCNC: 9.9 MG/DL
CHLORIDE SERPL-SCNC: 109 MMOL/L
CLARITY UR: ABNORMAL
CO2 SERPL-SCNC: 27 MMOL/L
COLOR UR: YELLOW
CREAT SERPL-MCNC: 0.7 MG/DL
DIFFERENTIAL METHOD: ABNORMAL
EOSINOPHIL # BLD AUTO: 0 K/UL
EOSINOPHIL NFR BLD: 0.6 %
ERYTHROCYTE [DISTWIDTH] IN BLOOD BY AUTOMATED COUNT: 12.9 %
EST. GFR  (AFRICAN AMERICAN): >60 ML/MIN/1.73 M^2
EST. GFR  (NON AFRICAN AMERICAN): >60 ML/MIN/1.73 M^2
GLUCOSE SERPL-MCNC: 100 MG/DL
GLUCOSE UR QL STRIP: NEGATIVE
HCT VFR BLD AUTO: 34.3 %
HGB BLD-MCNC: 11.3 G/DL
HGB UR QL STRIP: NEGATIVE
KETONES UR QL STRIP: NEGATIVE
LEUKOCYTE ESTERASE UR QL STRIP: ABNORMAL
LYMPHOCYTES # BLD AUTO: 2 K/UL
LYMPHOCYTES NFR BLD: 26.9 %
MCH RBC QN AUTO: 28.7 PG
MCHC RBC AUTO-ENTMCNC: 32.8 G/DL
MCV RBC AUTO: 87 FL
MICROSCOPIC COMMENT: ABNORMAL
MONOCYTES # BLD AUTO: 0.5 K/UL
MONOCYTES NFR BLD: 6.4 %
NEUTROPHILS # BLD AUTO: 4.9 K/UL
NEUTROPHILS NFR BLD: 65.7 %
NITRITE UR QL STRIP: NEGATIVE
PH UR STRIP: >8 [PH] (ref 5–8)
PLATELET # BLD AUTO: 216 K/UL
PMV BLD AUTO: 8.9 FL
POTASSIUM SERPL-SCNC: 3 MMOL/L
PROT SERPL-MCNC: 7 G/DL
PROT UR QL STRIP: NEGATIVE
RBC # BLD AUTO: 3.93 M/UL
SODIUM SERPL-SCNC: 146 MMOL/L
SP GR UR STRIP: 1.01 (ref 1–1.03)
SQUAMOUS #/AREA URNS HPF: 6 /HPF
URN SPEC COLLECT METH UR: ABNORMAL
UROBILINOGEN UR STRIP-ACNC: 1 EU/DL
WBC # BLD AUTO: 7.4 K/UL
WBC #/AREA URNS HPF: 5 /HPF (ref 0–5)

## 2018-04-10 PROCEDURE — 93005 ELECTROCARDIOGRAM TRACING: CPT

## 2018-04-10 PROCEDURE — 81000 URINALYSIS NONAUTO W/SCOPE: CPT

## 2018-04-10 PROCEDURE — 93010 ELECTROCARDIOGRAM REPORT: CPT | Mod: ,,, | Performed by: INTERNAL MEDICINE

## 2018-04-10 PROCEDURE — 85025 COMPLETE CBC W/AUTO DIFF WBC: CPT

## 2018-04-10 PROCEDURE — 80053 COMPREHEN METABOLIC PANEL: CPT

## 2018-04-10 PROCEDURE — 25000003 PHARM REV CODE 250: Performed by: EMERGENCY MEDICINE

## 2018-04-10 PROCEDURE — 99285 EMERGENCY DEPT VISIT HI MDM: CPT

## 2018-04-10 PROCEDURE — 36415 COLL VENOUS BLD VENIPUNCTURE: CPT

## 2018-04-10 RX ORDER — POTASSIUM CHLORIDE 750 MG/1
50 TABLET, EXTENDED RELEASE ORAL
Status: COMPLETED | OUTPATIENT
Start: 2018-04-10 | End: 2018-04-10

## 2018-04-10 RX ADMIN — POTASSIUM CHLORIDE 50 MEQ: 750 TABLET, EXTENDED RELEASE ORAL at 07:04

## 2018-04-10 NOTE — NURSING
"Met with patient and daughter, Mai, (POA) at bedside.  Patient is pleasant, awake, oriented to self, and can tell me her name.  Not oriented to persons at bedside, time, or place.  Does not answer questions appropriately.  Shows no signs of being combative at this time.      Patient brought to E D for confusion, being combative, punching/kicking at family, found wandering in street - EMS called.  Spoke with daughterMai at length - daughter asked about psychiatric care, specifically the steps to admission to a [facility] and psychiatric "programs" . Provided with community resource guide with lists of inpatient and outpatient psych treatment facilities.  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      Patient is able to voluntarily go to [facility], however, if at any time the patient states she wants to leave [facility] inpatient facility, psych facility will discharge patient.     Notified daughter that 24 hour/ 7 day per week, care is needed for patient's safety.     Physician will determine +/- PEC.  Daughter states she will speak with physician re: PEC.  Notified nurseRoni of discussion.  RICH Fuentes RN DCM  "

## 2018-04-10 NOTE — ED PROVIDER NOTES
"Encounter Date: 4/10/2018    SCRIBE #1 NOTE: I, Kerri Chaparro, am scribing for, and in the presence of, Dr. Sanon .       History     Chief Complaint   Patient presents with    Altered Mental Status     pt family reports pt is more combative than normal       04/10/2018 3:44 PM     Chief complaint: AMS      Marisol Payan is a 68 y.o. female with Alzheimer's Dementia, HTN, HLD who presents to the ED via EMS with complaints of AMS. Per family, pt was found "in the middle of the street" by police. Upon returning home, she became combative with the family. She often takes walks and "usually comes home." Pt reports back pain. At baseline, she is not oriented to year and place. She usually follows commands and has trouble explaining herself. Per relative, pt's pill box which she filled "a few days ago" is empty.  NKDA noted.       The history is provided by the spouse.     Review of patient's allergies indicates:  No Known Allergies  Past Medical History:   Diagnosis Date    Alzheimer's dementia     Anxiety     Back pain     Dementia     Hyperlipidemia     Hypertension     OAB (overactive bladder)     Rheumatoid arthritis     TIA (transient ischemic attack)      Past Surgical History:   Procedure Laterality Date    APPENDECTOMY  1980s    BREAST BIOPSY Right 2005 approx    negative     SECTION  1986    LUMBAR DISC SURGERY  2011    SHOULDER SURGERY Left 2000s    rotator cuff     Family History   Problem Relation Age of Onset    Hypertension Mother     Pancreatic cancer Mother     Cataracts Mother     Glaucoma Sister     Cancer Neg Hx     Stroke Neg Hx      Social History   Substance Use Topics    Smoking status: Never Smoker    Smokeless tobacco: Never Used    Alcohol use No     Review of Systems   Unable to perform ROS: Dementia   Musculoskeletal: Positive for back pain.   Psychiatric/Behavioral: Positive for behavioral problems and confusion.       Physical Exam     Initial " Vitals [04/10/18 1536]   BP Pulse Resp Temp SpO2   (!) 172/85 85 20 99.1 °F (37.3 °C) 100 %      MAP       114         Physical Exam    Nursing note and vitals reviewed.  Constitutional: She appears well-developed and well-nourished. She is not diaphoretic. No distress.   HENT:   Head: Normocephalic and atraumatic.   Mouth/Throat: Oropharynx is clear and moist.   Eyes: Conjunctivae are normal.   Neck: Neck supple.   Cardiovascular: Normal rate, regular rhythm, normal heart sounds and intact distal pulses. Exam reveals no gallop and no friction rub.    No murmur heard.  Pulmonary/Chest: Breath sounds normal. She has no wheezes. She has no rhonchi. She has no rales.   Abdominal: Soft. She exhibits no distension. There is no tenderness.   Musculoskeletal: Normal range of motion.   Neurological: She is alert and oriented to person, place, and time.   No dysarthria. Cranial nerves II-XII intact. No pronator drift. Normal light touch sensation. Limited expressive and receptive verbal ability, family says it is old for her.    Skin: No rash noted. No erythema.   Psychiatric: She has a normal mood and affect. Her speech is normal and behavior is normal. Judgment and thought content normal.         ED Course   Procedures  Labs Reviewed   CBC W/ AUTO DIFFERENTIAL - Abnormal; Notable for the following:        Result Value    RBC 3.93 (*)     Hemoglobin 11.3 (*)     Hematocrit 34.3 (*)     MPV 8.9 (*)     All other components within normal limits   COMPREHENSIVE METABOLIC PANEL - Abnormal; Notable for the following:     Sodium 146 (*)     Potassium 3.0 (*)     All other components within normal limits   URINALYSIS - Abnormal; Notable for the following:     Appearance, UA Hazy (*)     pH, UA >8.0 (*)     Leukocytes, UA Trace (*)     All other components within normal limits   URINALYSIS MICROSCOPIC - Abnormal; Notable for the following:     Bacteria, UA Few (*)     Amorphous, UA Many (*)     All other components within normal  limits             Medical Decision Making:   History:   Old Medical Records: I decided to obtain old medical records.  Clinical Tests:   Lab Tests: Ordered and Reviewed  Medical Tests: Ordered and Reviewed            Scribe Attestation:   Scribe #1: I performed the above scribed service and the documentation accurately describes the services I performed. I attest to the accuracy of the note.    I, Dr. Maurisio Sanon, personally performed the services described in this documentation. All medical record entries made by the scribe were at my direction and in my presence.  I have reviewed the chart and agree that the record reflects my personal performance and is accurate and complete. Maurisio Sanon MD.  5:53 PM 04/11/2018    Marisol Payan is a 68 y.o. female presenting with known history of Alzheimer's dementia with recent more unruly behavior consistent with psychosis secondary to dementia.  Patient was found wandering from home by authorities and brought to the emergency department by family.  PEC initiated at their request for geriatric psychiatry evaluation and medical management.  She does not have reliable follow-up here since family relocated here.  She has been noncompliant with medications.  I did perform a focused medical assessment to explore alternative etiologies relating to the patient's presentation or conditions in need of emergent stabilization in the emergency department.  None are evident.  The patient is medically cleared for transfer to facilitate further psychiatric evaluation.            ED Course as of Apr 11 0711   Tue Apr 10, 2018   1628 EKG:  NSR, rate of 77, normal intervals, L axis.  There are no acute ST or T wave changes suggestive of acute ischemia or infarction.    [MR]      ED Course User Index  [MR] Maurisio Sanon MD     Clinical Impression:     1. Dementia with psychosis        Disposition:   Disposition: Transferred                        Maurisio QUINONES  MD Fab  04/11/18 0711

## 2018-04-10 NOTE — ED NOTES
Pt awake alert oriented to self only, family at bedside family reports pt was wandering around outside today, pt became combative when family tried to bring pt in the house

## 2018-04-11 NOTE — ED NOTES
Pt going to [facility] in [place] [phone] or intake [phone], fax [phone] [id number] [address] [address] [place] [address]

## 2018-04-30 ENCOUNTER — DOCUMENTATION ONLY (OUTPATIENT)
Dept: FAMILY MEDICINE | Facility: CLINIC | Age: 68
End: 2018-04-30

## 2018-04-30 NOTE — PROGRESS NOTES
Pre-Visit Chart Review  For Appointment Scheduled on 05/01/18    Health Maintenance Due   Topic Date Due    TETANUS VACCINE  01/24/1967    Colonoscopy  01/24/1999    Zoster Vaccine  01/24/2009    Pneumococcal (65+) (1 of 2 - PCV13) 01/24/2014

## 2018-05-01 ENCOUNTER — OFFICE VISIT (OUTPATIENT)
Dept: FAMILY MEDICINE | Facility: CLINIC | Age: 68
End: 2018-05-01
Payer: MEDICARE

## 2018-05-01 VITALS
SYSTOLIC BLOOD PRESSURE: 126 MMHG | DIASTOLIC BLOOD PRESSURE: 76 MMHG | HEART RATE: 57 BPM | OXYGEN SATURATION: 98 % | TEMPERATURE: 99 F | BODY MASS INDEX: 25.78 KG/M2 | WEIGHT: 151 LBS | HEIGHT: 64 IN

## 2018-05-01 DIAGNOSIS — Z12.39 SCREENING FOR BREAST CANCER: ICD-10-CM

## 2018-05-01 DIAGNOSIS — K21.9 GASTROESOPHAGEAL REFLUX DISEASE, ESOPHAGITIS PRESENCE NOT SPECIFIED: ICD-10-CM

## 2018-05-01 DIAGNOSIS — F03.918 DEMENTIA WITH AGGRESSIVE BEHAVIOR: Primary | ICD-10-CM

## 2018-05-01 DIAGNOSIS — N64.4 BREAST PAIN, LEFT: ICD-10-CM

## 2018-05-01 DIAGNOSIS — Z12.11 SCREEN FOR COLON CANCER: ICD-10-CM

## 2018-05-01 DIAGNOSIS — N60.02 BREAST CYST, LEFT: ICD-10-CM

## 2018-05-01 PROCEDURE — 99499 UNLISTED E&M SERVICE: CPT | Mod: S$GLB,,, | Performed by: PHYSICIAN ASSISTANT

## 2018-05-01 PROCEDURE — 3074F SYST BP LT 130 MM HG: CPT | Mod: CPTII,S$GLB,, | Performed by: PHYSICIAN ASSISTANT

## 2018-05-01 PROCEDURE — 99999 PR PBB SHADOW E&M-EST. PATIENT-LVL V: CPT | Mod: PBBFAC,,, | Performed by: PHYSICIAN ASSISTANT

## 2018-05-01 PROCEDURE — 3078F DIAST BP <80 MM HG: CPT | Mod: CPTII,S$GLB,, | Performed by: PHYSICIAN ASSISTANT

## 2018-05-01 PROCEDURE — 99214 OFFICE O/P EST MOD 30 MIN: CPT | Mod: S$GLB,,, | Performed by: PHYSICIAN ASSISTANT

## 2018-05-01 RX ORDER — DIVALPROEX SODIUM 125 MG/1
TABLET, DELAYED RELEASE ORAL
COMMUNITY
Start: 2018-04-19 | End: 2018-06-19 | Stop reason: SDUPTHER

## 2018-05-01 RX ORDER — OXYBUTYNIN CHLORIDE 10 MG/1
TABLET, EXTENDED RELEASE ORAL
COMMUNITY
Start: 2018-04-18 | End: 2018-06-12 | Stop reason: SDUPTHER

## 2018-05-01 RX ORDER — OMEPRAZOLE 40 MG/1
40 CAPSULE, DELAYED RELEASE ORAL DAILY
Qty: 90 CAPSULE | Refills: 3 | Status: SHIPPED | OUTPATIENT
Start: 2018-05-01 | End: 2019-05-01

## 2018-05-02 NOTE — PROGRESS NOTES
"Subjective:       Patient ID: Marisol Payan is a 68 y.o. female.    Chief Complaint: Hospital Follow Up    HPI   Patient is a 68 year old AA female presenting to the clinic for hospital follow-up. HPI provided by daughter.  Patient was brought to the ER on 4/10/18 for after patient was found wandering in the street. Patient with known diagnosis of dementia with behavioral disturbance was admitted to [facility] for further decline in her diagnosis. Patient was continued on namenda & had depakote 125mg daily added. Her daughter reports some improvement in behavioral outburts, but continues that they are getting harder to manage.  Her daughter has recently gotten her into a [facility] care 3-4x/week, but does not have the financial resources to place her full time. She is also concerned that if her mother has a behavioral disturbance while at the center that she will be kicked out. She does not have any other help in town besides a niece that can help occasionally. Daughter reports that she is contemplating moving back to [place] for help from her other siblings. She is not established with psychiatry or neurology because the daughter states the last neurologist said "there is nothing further that could be done."    Her daughter does report that her mother is occasionally complaining of indigestion & belching more frequently. She is not taking anything for GERD. She has in the past.    There is also concern regarding a spot in her left axilla that has been reported as a cyst in the past that she continues to complain of discomfort.  Review of Systems   Unable to perform ROS: Dementia       Objective:      Physical Exam   Constitutional: Vital signs are normal. She appears well-developed and well-nourished. No distress.   Cardiovascular: Normal rate, regular rhythm, S1 normal, S2 normal and normal heart sounds.  Exam reveals no gallop.    No murmur heard.  Pulses:       Radial pulses are 2+ " on the right side, and 2+ on the left side.   Pulmonary/Chest: Effort normal and breath sounds normal. No respiratory distress. She has no wheezes. She has no rhonchi. Left breast exhibits no inverted nipple, no mass, no nipple discharge, no skin change and no tenderness.   Skin: Skin is warm and dry. She is not diaphoretic.   Psychiatric: She has a normal mood and affect. Judgment normal. She is withdrawn. Cognition and memory are impaired. She is noncommunicative. She exhibits abnormal recent memory and abnormal remote memory.       Assessment:       1. Dementia with aggressive behavior    2. Gastroesophageal reflux disease, esophagitis presence not specified    3. Breast cyst, left    4. Breast pain, left    5. Screening for breast cancer    6. Screen for colon cancer        Plan:       Marisol was seen today for hospital follow up.    Diagnoses and all orders for this visit:    Dementia with aggressive behavior  -     Ambulatory referral to Outpatient Case Management    Gastroesophageal reflux disease, esophagitis presence not specified  -     omeprazole (PRILOSEC) 40 MG capsule; Take 1 capsule (40 mg total) by mouth once daily.    Breast cyst, left  -     Cancel: Mammo Digital Diagnostic Bilateral; Future  -     US Breast Right Limited; Future  -     Mammo Digital Diagnostic Bilat with Tomosynthesis_CAD; Future    Breast pain, left  -     Cancel: Mammo Digital Diagnostic Bilateral; Future  -     US Breast Right Limited; Future  -     Mammo Digital Diagnostic Bilat with Tomosynthesis_CAD; Future    Screening for breast cancer  -     Cancel: Mammo Digital Diagnostic Bilateral; Future  -     US Breast Right Limited; Future  -     Mammo Digital Diagnostic Bilat with Tomosynthesis_CAD; Future    Screen for colon cancer  -     Ambulatory referral to Gastroenterology

## 2018-05-03 ENCOUNTER — OUTPATIENT CASE MANAGEMENT (OUTPATIENT)
Dept: ADMINISTRATIVE | Facility: OTHER | Age: 68
End: 2018-05-03

## 2018-05-03 ENCOUNTER — TELEPHONE (OUTPATIENT)
Dept: FAMILY MEDICINE | Facility: CLINIC | Age: 68
End: 2018-05-03

## 2018-05-03 NOTE — PROGRESS NOTES
Thank you for the referral.  Patient has been assigned to Taisha Echevarria LMSW for low risk screening for Outpatient Case Management.     Reason for referral: Patient's daughter needing help with resources to help her mother with care 2/2 worsening dementia with behavorial disturbance. Also, is there a certain psychiatrist in the area that may be accepting patients for dementia with behavorial disturbances that we can help her get set up with?    Please contact [facility] at [phone] with any questions.    Thank you,    Taisha Echevarria LMSW

## 2018-05-03 NOTE — TELEPHONE ENCOUNTER
----- Message from Taisha Echevarria LMSW sent at 5/3/2018  8:47 AM CDT -----  Thank you for the referral.  Patient has been assigned to Taisha Echevarria LMSW for low risk screening for Outpatient Case Management.     Reason for referral: Patient's daughter needing help with resources to help her mother with care 2/2 worsening dementia with behavorial disturbance. Also, is there a certain psychiatrist in the area that may be accepting patients for dementia with behavorial disturbances that we can help her get set up with?    Please contact [facility] at [phone] with any questions.    Thank you,    Taisha Echevarria LMSW

## 2018-05-04 ENCOUNTER — OUTPATIENT CASE MANAGEMENT (OUTPATIENT)
Dept: ADMINISTRATIVE | Facility: OTHER | Age: 68
End: 2018-05-04

## 2018-05-04 ENCOUNTER — TELEPHONE (OUTPATIENT)
Dept: ENDOCRINOLOGY | Facility: CLINIC | Age: 68
End: 2018-05-04

## 2018-05-04 ENCOUNTER — TELEPHONE (OUTPATIENT)
Dept: FAMILY MEDICINE | Facility: CLINIC | Age: 68
End: 2018-05-04

## 2018-05-04 NOTE — TELEPHONE ENCOUNTER
----- Message from Taisha Echevarria LMSW sent at 5/4/2018 11:32 AM CDT -----  This LMSW received a referral on the above patient. This LMSW provided patient/caregiver with the following resource: [facility], [facility], [facility], Senior Resource Guide.       Thank you for the referral,    Taisha Echevarria LMSW

## 2018-05-04 NOTE — TELEPHONE ENCOUNTER
----- Message from Taisha Echevarria LMSW sent at 5/4/2018 11:32 AM CDT -----  This LMSW received a referral on the above patient. This LMSW provided patient/caregiver with the following resource: [facility], [facility], [facility], Senior Resource Guide.       Thank you for the referral,    Taisha Echevarria LMSW

## 2018-05-04 NOTE — PROGRESS NOTES
This LMSW received a referral on the above patient.   Reason for referral: resources for caring for patient  Name of the community resource that was provided: [facility], [facility], [facility], [facility] Resource Guide  Resource given to: Patient's daughter via [place] mail    LMSW completed assessment with patient's daughter, Mai. Pt's daughter reports that patient lives with her and Pt is independent with ADLs. Pt's daughter reports she is MPOA and states she is a nurse. Pt's daughter reports that patient does requires assistance with IADLs and she prepares patient's meals and manages all of the patient's medications. Pt's daughter reports that she has already applied for community choice waiver through [facility] and reports patient remains on their waiting list. Pt's daughter reports that she currently has to pay 60 dollars a day for Pt to attend adult day health center. Pt's daughter reports they currently do not have the ability to afford cost of day health center for Pt. Pt's daughter reports she is not currently interested in having patient placed in a nursing home and prefers to look for options to care for patient at home. Pt's daughter also reports that she may move out of state following her daughter's graduation from [employer] to have more support from family with Pt's care. Pt's daughter discussed being very knowledgeable about resource in the community and reports she has researched various assistance programs. LMSW discussed option for obtain private hire aides in the home and Pt's daughter reports aides are currently not affordable as she has paid $20 dollars an hour to have someone assist her mother. LMSW discussed PACE programs through [employer] with patient's daughter and she reported that she has also looked into this program. LMSW offered to send information about PACE program, [facility] (financial  assistance), and Senior Resource Guide to patient's daughter and she was in agreement to receiving these resources. LMSW also contacted [facility] [phone] left message to find out if agency still accepting referrals for respite care/personal care. Referral source notified.

## 2018-05-08 ENCOUNTER — HOSPITAL ENCOUNTER (OUTPATIENT)
Dept: RADIOLOGY | Facility: HOSPITAL | Age: 68
Discharge: HOME | End: 2018-05-08
Attending: PHYSICIAN ASSISTANT
Payer: MEDICARE

## 2018-05-08 DIAGNOSIS — Z12.39 SCREENING FOR BREAST CANCER: ICD-10-CM

## 2018-05-08 DIAGNOSIS — N60.02 BREAST CYST, LEFT: ICD-10-CM

## 2018-05-08 DIAGNOSIS — N64.4 BREAST PAIN, LEFT: ICD-10-CM

## 2018-05-08 PROCEDURE — 77062 BREAST TOMOSYNTHESIS BI: CPT | Mod: 26,,, | Performed by: RADIOLOGY

## 2018-05-08 PROCEDURE — 77066 DX MAMMO INCL CAD BI: CPT | Mod: 26,,, | Performed by: RADIOLOGY

## 2018-05-08 PROCEDURE — 77062 BREAST TOMOSYNTHESIS BI: CPT | Mod: TC

## 2018-06-08 ENCOUNTER — PES CALL (OUTPATIENT)
Dept: ADMINISTRATIVE | Facility: CLINIC | Age: 68
End: 2018-06-08

## 2018-06-12 RX ORDER — OXYBUTYNIN CHLORIDE 10 MG/1
TABLET, EXTENDED RELEASE ORAL
Qty: 30 TABLET | Refills: 0 | Status: SHIPPED | OUTPATIENT
Start: 2018-06-12 | End: 2018-07-10 | Stop reason: SDUPTHER

## 2018-06-13 ENCOUNTER — PATIENT MESSAGE (OUTPATIENT)
Dept: FAMILY MEDICINE | Facility: CLINIC | Age: 68
End: 2018-06-13

## 2018-06-13 ENCOUNTER — TELEPHONE (OUTPATIENT)
Dept: FAMILY MEDICINE | Facility: CLINIC | Age: 68
End: 2018-06-13

## 2018-06-13 DIAGNOSIS — I10 ESSENTIAL HYPERTENSION: ICD-10-CM

## 2018-06-13 RX ORDER — LOSARTAN POTASSIUM AND HYDROCHLOROTHIAZIDE 25; 100 MG/1; MG/1
1 TABLET ORAL DAILY
Qty: 90 TABLET | Refills: 3 | Status: CANCELLED | OUTPATIENT
Start: 2018-06-13

## 2018-06-14 NOTE — TELEPHONE ENCOUNTER
Tried to contact patient and send message to patient portal regarding this prescription. Awaiting answer.

## 2018-06-14 NOTE — TELEPHONE ENCOUNTER
I have never seen a previous message on this. Who was prescribing this previously? I do not prescribe depakote. This is usually something that would be prescribed by a neurologist or psychiatrist. Her PCP, Dr. Tobar, may agree to fill it.

## 2018-06-19 RX ORDER — DIVALPROEX SODIUM 125 MG/1
125 TABLET, DELAYED RELEASE ORAL NIGHTLY
Qty: 30 TABLET | Refills: 2 | Status: SHIPPED | OUTPATIENT
Start: 2018-06-19 | End: 2018-08-27 | Stop reason: ALTCHOICE

## 2018-06-25 ENCOUNTER — PATIENT MESSAGE (OUTPATIENT)
Dept: FAMILY MEDICINE | Facility: CLINIC | Age: 68
End: 2018-06-25

## 2018-07-10 RX ORDER — OXYBUTYNIN CHLORIDE 10 MG/1
TABLET, EXTENDED RELEASE ORAL
Qty: 30 TABLET | Refills: 0 | Status: SHIPPED | OUTPATIENT
Start: 2018-07-10 | End: 2019-01-29 | Stop reason: SDUPTHER

## 2018-07-16 RX ORDER — OXYBUTYNIN CHLORIDE 10 MG/1
TABLET, EXTENDED RELEASE ORAL
Qty: 30 TABLET | Refills: 0 | Status: SHIPPED | OUTPATIENT
Start: 2018-07-16 | End: 2018-08-27

## 2018-08-24 ENCOUNTER — DOCUMENTATION ONLY (OUTPATIENT)
Dept: FAMILY MEDICINE | Facility: CLINIC | Age: 68
End: 2018-08-24

## 2018-08-27 ENCOUNTER — OFFICE VISIT (OUTPATIENT)
Dept: FAMILY MEDICINE | Facility: CLINIC | Age: 68
End: 2018-08-27
Payer: MEDICARE

## 2018-08-27 VITALS
BODY MASS INDEX: 24.69 KG/M2 | DIASTOLIC BLOOD PRESSURE: 77 MMHG | SYSTOLIC BLOOD PRESSURE: 124 MMHG | OXYGEN SATURATION: 98 % | HEART RATE: 70 BPM | WEIGHT: 144.63 LBS | HEIGHT: 64 IN | TEMPERATURE: 99 F

## 2018-08-27 DIAGNOSIS — N39.498 OTHER URINARY INCONTINENCE: ICD-10-CM

## 2018-08-27 DIAGNOSIS — F01.518 VASCULAR DEMENTIA WITH BEHAVIOR DISTURBANCE: ICD-10-CM

## 2018-08-27 DIAGNOSIS — F03.91 DEMENTIA WITH BEHAVIORAL DISTURBANCE, UNSPECIFIED DEMENTIA TYPE: Primary | ICD-10-CM

## 2018-08-27 PROCEDURE — 3078F DIAST BP <80 MM HG: CPT | Mod: CPTII,S$GLB,, | Performed by: PHYSICIAN ASSISTANT

## 2018-08-27 PROCEDURE — 99999 PR PBB SHADOW E&M-EST. PATIENT-LVL IV: CPT | Mod: PBBFAC,,, | Performed by: PHYSICIAN ASSISTANT

## 2018-08-27 PROCEDURE — 99214 OFFICE O/P EST MOD 30 MIN: CPT | Mod: S$GLB,,, | Performed by: PHYSICIAN ASSISTANT

## 2018-08-27 PROCEDURE — 3074F SYST BP LT 130 MM HG: CPT | Mod: CPTII,S$GLB,, | Performed by: PHYSICIAN ASSISTANT

## 2018-08-27 PROCEDURE — 99499 UNLISTED E&M SERVICE: CPT | Mod: HCNC,S$GLB,, | Performed by: PHYSICIAN ASSISTANT

## 2018-08-27 RX ORDER — QUETIAPINE FUMARATE 100 MG/1
TABLET, FILM COATED ORAL
Qty: 30 TABLET | Refills: 11 | Status: SHIPPED | OUTPATIENT
Start: 2018-08-27 | End: 2019-01-29

## 2018-08-27 NOTE — PROGRESS NOTES
Subjective:       Patient ID: Marisol Payan is a 68 y.o. female.    Chief Complaint: Dementia (very combative )    HPI   Pt has worsened dementia sx  Has now become combative   Recent loss of holding urine but bowels still normal  Has been evaluated in neurology  Pt is with her niece today but did talk with her daughter on the phone  Review of Systems   Constitutional: Positive for activity change. Negative for appetite change, chills, diaphoresis, fatigue, fever and unexpected weight change.   HENT: Negative.    Eyes: Negative.    Respiratory: Negative.    Gastrointestinal: Negative.    Endocrine: Negative.    Genitourinary: Negative.    Musculoskeletal: Negative.    Skin: Negative.  Negative for rash.   Psychiatric/Behavioral: Positive for agitation, behavioral problems and confusion. Negative for decreased concentration, dysphoric mood, hallucinations, self-injury, sleep disturbance and suicidal ideas. The patient is nervous/anxious. The patient is not hyperactive.        Objective:      Physical Exam   Constitutional: She appears well-developed and well-nourished. No distress.   HENT:   Head: Normocephalic and atraumatic.   Neck: Normal range of motion. Neck supple. No tracheal deviation present. No thyromegaly present.   Musculoskeletal: She exhibits no edema.   Lymphadenopathy:     She has no cervical adenopathy.   Neurological: She is alert.   Skin: Skin is warm and dry. No rash noted.   Psychiatric:   Pt confused  Did know her name only   Vitals reviewed.      Assessment:       1. Dementia with behavioral disturbance, unspecified dementia type    2. Vascular dementia with behavior disturbance    3. Other urinary incontinence        Plan:       Dementia with behavioral disturbance, unspecified dementia type  -     Ambulatory consult to Neuropsychology  -     QUEtiapine (SEROQUEL) 100 MG Tab; 1 tab daily at bedtime  Dispense: 30 tablet; Refill: 11  -     Urinalysis; Future; Expected date:  08/27/2018  -     Urine culture; Future; Expected date: 08/27/2018  -     Urinalysis; Future; Expected date: 08/27/2018  -     Urine culture    Vascular dementia with behavior disturbance  -     Ambulatory consult to Neuropsychology  -     QUEtiapine (SEROQUEL) 100 MG Tab; 1 tab daily at bedtime  Dispense: 30 tablet; Refill: 11  -     Urinalysis; Future; Expected date: 08/27/2018  -     Urine culture; Future; Expected date: 08/27/2018  -     Urinalysis; Future; Expected date: 08/27/2018  -     Urine culture    Other urinary incontinence  -     Cancel: Urinalysis; Future; Expected date: 08/27/2018  -     Cancel: Urine culture  -     Ambulatory consult to Neuropsychology  -     QUEtiapine (SEROQUEL) 100 MG Tab; 1 tab daily at bedtime  Dispense: 30 tablet; Refill: 11  -     Urinalysis; Future; Expected date: 08/27/2018  -     Urine culture; Future; Expected date: 08/27/2018  -     Urinalysis; Future; Expected date: 08/27/2018  -     Urine culture    50 mins spent with pt and family with 40 mins [place]  Will check for UTI  Daughter may bring in leave of absence paperwork so she can stay at home with her mother   F/u with new PCP in near future

## 2018-08-29 ENCOUNTER — PES CALL (OUTPATIENT)
Dept: ADMINISTRATIVE | Facility: CLINIC | Age: 68
End: 2018-08-29

## 2018-09-27 ENCOUNTER — TELEPHONE (OUTPATIENT)
Dept: FAMILY MEDICINE | Facility: CLINIC | Age: 68
End: 2018-09-27

## 2018-09-27 NOTE — TELEPHONE ENCOUNTER
Spoke with patient's daughter Mai. She said she came into the office for an appointment on 8/27 with her mother to see ANURAG Cartwright. She stated that she was waiting on a letter (Leave of Absence from her job) from the provider saying that her mother needed constant care, due to the patient having severe Alzheimers. She said the letter did not not need to be detailed. Please advise.

## 2018-10-01 ENCOUNTER — TELEPHONE (OUTPATIENT)
Dept: FAMILY MEDICINE | Facility: CLINIC | Age: 68
End: 2018-10-01

## 2018-10-01 NOTE — TELEPHONE ENCOUNTER
Patient's daughter Josef Wren MRN [id number] would like a letter stating that her mother has severe Alzheimer's and needs the letter for Leave of absence for her job. Mattfranca states that her mother cannot be left alone, due to the severity of the Alzheimer's. The patient came to see you on August 27th. The letter does not need to be detailed according to Josef. Josef would like to  the letter tomorrow - 10/2.

## 2018-10-04 NOTE — TELEPHONE ENCOUNTER
Spoke with patient daughter, she dropped off copies of Power of  stating she is care giver. This will be sent to scanning and letter has been written and faxed as required

## 2018-10-17 RX ORDER — OXYBUTYNIN CHLORIDE 10 MG/1
TABLET, EXTENDED RELEASE ORAL
Qty: 30 TABLET | Refills: 0 | OUTPATIENT
Start: 2018-10-17

## 2018-11-12 RX ORDER — OXYBUTYNIN CHLORIDE 10 MG/1
TABLET, EXTENDED RELEASE ORAL
Qty: 30 TABLET | Refills: 0 | OUTPATIENT
Start: 2018-11-12

## 2019-01-21 ENCOUNTER — PATIENT OUTREACH (OUTPATIENT)
Dept: ADMINISTRATIVE | Facility: HOSPITAL | Age: 69
End: 2019-01-21

## 2019-01-23 ENCOUNTER — TELEPHONE (OUTPATIENT)
Dept: FAMILY MEDICINE | Facility: CLINIC | Age: 69
End: 2019-01-23

## 2019-01-23 NOTE — TELEPHONE ENCOUNTER
----- Message from Paz Lee sent at 1/23/2019  1:40 PM CST -----  Contact: Mai Wren (Daughter)  Mai Wren (Daughter) states that she is unable to bring the pt in 2/1/19 and would like to be fitted in the week on 1/28-2/30 anytime,please..[phone]

## 2019-01-28 ENCOUNTER — DOCUMENTATION ONLY (OUTPATIENT)
Dept: FAMILY MEDICINE | Facility: CLINIC | Age: 69
End: 2019-01-28

## 2019-01-28 NOTE — PROGRESS NOTES
Pre-Visit Chart Review  For Appointment Scheduled on 1/29/19    Health Maintenance Due   Topic Date Due    TETANUS VACCINE  01/24/1967    Colonoscopy  01/24/1999    Zoster Vaccine  01/24/2009    Pneumococcal Vaccine (65+ Low/Medium Risk) (1 of 2 - PCV13) 01/24/2014    Influenza Vaccine  08/01/2018

## 2019-01-29 ENCOUNTER — TELEPHONE (OUTPATIENT)
Dept: PSYCHIATRY | Facility: CLINIC | Age: 69
End: 2019-01-29

## 2019-01-29 ENCOUNTER — TELEPHONE (OUTPATIENT)
Dept: FAMILY MEDICINE | Facility: CLINIC | Age: 69
End: 2019-01-29

## 2019-01-29 ENCOUNTER — OFFICE VISIT (OUTPATIENT)
Dept: FAMILY MEDICINE | Facility: CLINIC | Age: 69
End: 2019-01-29
Payer: MEDICARE

## 2019-01-29 ENCOUNTER — OUTPATIENT CASE MANAGEMENT (OUTPATIENT)
Dept: ADMINISTRATIVE | Facility: OTHER | Age: 69
End: 2019-01-29

## 2019-01-29 VITALS
HEIGHT: 64 IN | WEIGHT: 152.13 LBS | BODY MASS INDEX: 25.97 KG/M2 | DIASTOLIC BLOOD PRESSURE: 80 MMHG | TEMPERATURE: 98 F | HEART RATE: 98 BPM | SYSTOLIC BLOOD PRESSURE: 159 MMHG

## 2019-01-29 DIAGNOSIS — F02.81 ALZHEIMER'S DEMENTIA WITH BEHAVIORAL DISTURBANCE, UNSPECIFIED TIMING OF DEMENTIA ONSET: ICD-10-CM

## 2019-01-29 DIAGNOSIS — F02.81 ALZHEIMER'S DEMENTIA WITH BEHAVIORAL DISTURBANCE, UNSPECIFIED TIMING OF DEMENTIA ONSET: Primary | ICD-10-CM

## 2019-01-29 DIAGNOSIS — I10 ESSENTIAL HYPERTENSION: Chronic | ICD-10-CM

## 2019-01-29 DIAGNOSIS — N32.81 OAB (OVERACTIVE BLADDER): ICD-10-CM

## 2019-01-29 DIAGNOSIS — N39.498 OTHER URINARY INCONTINENCE: ICD-10-CM

## 2019-01-29 DIAGNOSIS — F01.518 VASCULAR DEMENTIA WITH BEHAVIOR DISTURBANCE: ICD-10-CM

## 2019-01-29 DIAGNOSIS — F03.91 DEMENTIA WITH BEHAVIORAL DISTURBANCE, UNSPECIFIED DEMENTIA TYPE: ICD-10-CM

## 2019-01-29 DIAGNOSIS — E78.5 HYPERLIPIDEMIA, UNSPECIFIED HYPERLIPIDEMIA TYPE: Primary | Chronic | ICD-10-CM

## 2019-01-29 DIAGNOSIS — G30.9 ALZHEIMER'S DEMENTIA WITH BEHAVIORAL DISTURBANCE, UNSPECIFIED TIMING OF DEMENTIA ONSET: Primary | ICD-10-CM

## 2019-01-29 DIAGNOSIS — G30.9 ALZHEIMER'S DEMENTIA WITH BEHAVIORAL DISTURBANCE, UNSPECIFIED TIMING OF DEMENTIA ONSET: ICD-10-CM

## 2019-01-29 PROCEDURE — 1100F PR PT FALLS ASSESS DOC 2+ FALLS/FALL W/INJURY/YR: ICD-10-PCS | Mod: HCNC,CPTII,S$GLB, | Performed by: FAMILY MEDICINE

## 2019-01-29 PROCEDURE — 99999 PR PBB SHADOW E&M-EST. PATIENT-LVL III: CPT | Mod: PBBFAC,HCNC,, | Performed by: FAMILY MEDICINE

## 2019-01-29 PROCEDURE — 1100F PTFALLS ASSESS-DOCD GE2>/YR: CPT | Mod: HCNC,CPTII,S$GLB, | Performed by: FAMILY MEDICINE

## 2019-01-29 PROCEDURE — 3077F SYST BP >= 140 MM HG: CPT | Mod: HCNC,CPTII,S$GLB, | Performed by: FAMILY MEDICINE

## 2019-01-29 PROCEDURE — 99999 PR PBB SHADOW E&M-EST. PATIENT-LVL III: ICD-10-PCS | Mod: PBBFAC,HCNC,, | Performed by: FAMILY MEDICINE

## 2019-01-29 PROCEDURE — 99499 UNLISTED E&M SERVICE: CPT | Mod: S$GLB,,, | Performed by: FAMILY MEDICINE

## 2019-01-29 PROCEDURE — 3288F FALL RISK ASSESSMENT DOCD: CPT | Mod: HCNC,CPTII,S$GLB, | Performed by: FAMILY MEDICINE

## 2019-01-29 PROCEDURE — 3288F PR FALLS RISK ASSESSMENT DOCUMENTED: ICD-10-PCS | Mod: HCNC,CPTII,S$GLB, | Performed by: FAMILY MEDICINE

## 2019-01-29 PROCEDURE — 99214 PR OFFICE/OUTPT VISIT, EST, LEVL IV, 30-39 MIN: ICD-10-PCS | Mod: HCNC,S$GLB,, | Performed by: FAMILY MEDICINE

## 2019-01-29 PROCEDURE — 99214 OFFICE O/P EST MOD 30 MIN: CPT | Mod: HCNC,S$GLB,, | Performed by: FAMILY MEDICINE

## 2019-01-29 PROCEDURE — 3077F PR MOST RECENT SYSTOLIC BLOOD PRESSURE >= 140 MM HG: ICD-10-PCS | Mod: HCNC,CPTII,S$GLB, | Performed by: FAMILY MEDICINE

## 2019-01-29 PROCEDURE — 3079F PR MOST RECENT DIASTOLIC BLOOD PRESSURE 80-89 MM HG: ICD-10-PCS | Mod: HCNC,CPTII,S$GLB, | Performed by: FAMILY MEDICINE

## 2019-01-29 PROCEDURE — 99499 RISK ADDL DX/OHS AUDIT: ICD-10-PCS | Mod: S$GLB,,, | Performed by: FAMILY MEDICINE

## 2019-01-29 PROCEDURE — 3079F DIAST BP 80-89 MM HG: CPT | Mod: HCNC,CPTII,S$GLB, | Performed by: FAMILY MEDICINE

## 2019-01-29 RX ORDER — QUETIAPINE FUMARATE 100 MG/1
100 TABLET, FILM COATED ORAL 2 TIMES DAILY
Qty: 180 TABLET | Refills: 3 | Status: SHIPPED | OUTPATIENT
Start: 2019-01-29 | End: 2019-01-29

## 2019-01-29 RX ORDER — OXYBUTYNIN CHLORIDE 10 MG/1
10 TABLET, EXTENDED RELEASE ORAL DAILY
Qty: 90 TABLET | Refills: 3 | Status: SHIPPED | OUTPATIENT
Start: 2019-01-29 | End: 2020-01-29

## 2019-01-29 RX ORDER — QUETIAPINE FUMARATE 100 MG/1
100 TABLET, FILM COATED ORAL 2 TIMES DAILY
Qty: 180 TABLET | Refills: 3 | Status: SHIPPED | OUTPATIENT
Start: 2019-01-29 | End: 2019-09-04 | Stop reason: SDUPTHER

## 2019-01-29 NOTE — PROGRESS NOTES
Subjective:   Patient ID: Marisol Payan is a 69 y.o. female     Chief Complaint:Establish Care      Patient with chronic dementia with behavioral disturbance.  Patient with hypertension currently poorly controlled.  Patient with chronic overactive bladder currently managed with oxybutynin.      Review of Systems   Respiratory: Negative for shortness of breath.    Cardiovascular: Negative for chest pain.   Gastrointestinal: Negative for abdominal pain.   Genitourinary: Negative for dysuria.   Psychiatric/Behavioral: Positive for agitation and behavioral problems.     Past Medical History:   Diagnosis Date    Alzheimer's dementia     Anxiety     Back pain     Dementia     Hyperlipidemia     Hypertension     OAB (overactive bladder)     Rheumatoid arthritis     TIA (transient ischemic attack)      Objective:     Vitals:    01/29/19 0911   BP: (!) 159/80   Pulse: 98   Temp: 98.2 °F (36.8 °C)     Body mass index is 26.11 kg/m².  Physical Exam   Neck: Neck supple. No tracheal deviation present.   Cardiovascular: Normal rate, regular rhythm and normal heart sounds.   Pulmonary/Chest: Effort normal. No respiratory distress.   Musculoskeletal: Normal range of motion. She exhibits no edema.     Assessment:     1. Hyperlipidemia, unspecified hyperlipidemia type    2. Essential hypertension    3. Alzheimer's dementia with behavioral disturbance, unspecified timing of dementia onset    4. Dementia with behavioral disturbance, unspecified dementia type    5. Vascular dementia with behavior disturbance    6. Other urinary incontinence      Plan:   Hyperlipidemia, unspecified hyperlipidemia type  Will discontinue Zocor as patient with dementia bigger concern will be worsening dementia than elevated cholesterol.    Essential hypertension  Patient with hypertension which is poorly controlled today.  Patient not take her blood pressure medications this morning.    Alzheimer's dementia with behavioral disturbance,  unspecified timing of dementia onset  -     Ambulatory referral to Social Work    Dementia with behavioral disturbance, unspecified dementia type  -     Discontinue: QUEtiapine (SEROQUEL) 100 MG Tab; Take 1 tablet (100 mg total) by mouth 2 (two) times daily. 1 tab daily at bedtime  Dispense: 180 tablet; Refill: 3  -     QUEtiapine (SEROQUEL) 100 MG Tab; Take 1 tablet (100 mg total) by mouth 2 (two) times daily.  Dispense: 180 tablet; Refill: 3  Will increase the dose of patient's Seroquel due to behavioral issues related to her dementia.    Vascular dementia with behavior disturbance  -     Discontinue: QUEtiapine (SEROQUEL) 100 MG Tab; Take 1 tablet (100 mg total) by mouth 2 (two) times daily. 1 tab daily at bedtime  Dispense: 180 tablet; Refill: 3  -     QUEtiapine (SEROQUEL) 100 MG Tab; Take 1 tablet (100 mg total) by mouth 2 (two) times daily.  Dispense: 180 tablet; Refill: 3    Other urinary incontinence  -     Discontinue: QUEtiapine (SEROQUEL) 100 MG Tab; Take 1 tablet (100 mg total) by mouth 2 (two) times daily. 1 tab daily at bedtime  Dispense: 180 tablet; Refill: 3  -     QUEtiapine (SEROQUEL) 100 MG Tab; Take 1 tablet (100 mg total) by mouth 2 (two) times daily.  Dispense: 180 tablet; Refill: 3    OAB (overactive bladder)  -     oxybutynin (DITROPAN-XL) 10 MG 24 hr tablet; Take 1 tablet (10 mg total) by mouth once daily.  Dispense: 90 tablet; Refill: 3      Time spent with patient: 30 minutes and over half of that time was spent on counseling an coordination of care.    Wale Russell MD  01/29/2019    Portions of this note have been dictated with SHEREE Garzon

## 2019-01-29 NOTE — TELEPHONE ENCOUNTER
Pt daughter called regarding nursing home placement. Informed Daughter that our LCSW do not assist with this type of placement. Discussed with [facility] case management whom stated they would be happy to assist with this. Gisele stated a new referral would need to be placed. Informed Daughter I will send a message to Dr. Russell for the correct referral. Informed Daughter once referral is placed she will receive a call from the Case management department. Daughter verbalized understanding with no further questions.

## 2019-01-29 NOTE — TELEPHONE ENCOUNTER
----- Message from Leigh Ramos sent at 1/29/2019  3:03 PM CST -----  Thank you for the referral.  Patient has been assigned to Taisha Chaudhari LMSW for low risk screening for Outpatient Case Management.      Reason for referral: Alzheimer's dementia with behavioral disturbance, unspecified timing of dementia onset     Please contact [facility] at rrw. [phone] with any questions.     Thank you,     Leigh Ramos, [facility]  Outpatient Care Mgmt.  [phone]

## 2019-01-29 NOTE — PROGRESS NOTES
Thank you for the referral.  Patient has been assigned to Taisha Chaudhari LMSW for low risk screening for Outpatient Case Management.     Reason for referral: Alzheimer's dementia with behavioral disturbance, unspecified timing of dementia onset    Please contact [facility] at ext. [phone] with any questions.    Thank you,    Leigh Ramos, [facility]  Outpatient Care Mgmt.  [phone]

## 2019-01-31 ENCOUNTER — OUTPATIENT CASE MANAGEMENT (OUTPATIENT)
Dept: ADMINISTRATIVE | Facility: OTHER | Age: 69
End: 2019-01-31

## 2019-02-01 ENCOUNTER — OUTPATIENT CASE MANAGEMENT (OUTPATIENT)
Dept: ADMINISTRATIVE | Facility: OTHER | Age: 69
End: 2019-02-01

## 2019-02-01 NOTE — PROGRESS NOTES
This LMSW attempted to reach patient/caregiver to provide resource and left msg requesting a return call.

## 2019-02-01 NOTE — PROGRESS NOTES
[facility] completed assessment with patient's daughter, Mai. Patient is known to [facility] and previous assessment was completed on 5/4/18. Patient continues to stay with her daughter. Pt's daughter reports patient needs assistance with daily activities and requires supervision every day. Patient's daughter reports she attempted to keep the patient home and did not want to pursue nursing home placement. Pt's daughter reports she has no other choice but to pursue nursing home placement because she cannot manage patient's care at home. Pt's daughter reports patient receives approximately 1700 dollars each month from social security and states the patient does not have any other assets. Pt's daughter is requesting to know how she can obtain urgent nursing home placement for her mother. She states she is frustrated with the process and is not getting anywhere. Patient's daughter informed on SW role and how SW can assist family with obtaining nursing home placement. Patient's daughter reports that she has already identified some potential nursing facilities and has completed some tours. She reports having staff at nursing facilities question her about patient's behavior and if patient has ever wandered off. SW informed patient's daughter nursing home typically want clinical information sent to them before they will accept a patient. SW informed patient's daughter it's the decision of each facility to accept or deny a potential resident. Patient's daughter informed SW can her with process by submitting nursing home referrals to her top choices. Patient's daughter reports she has appointment to meet with staff from [facility] on Monday and she will contact [facility] after appointment Monday to request assistance if needed.

## 2019-02-05 ENCOUNTER — OUTPATIENT CASE MANAGEMENT (OUTPATIENT)
Dept: ADMINISTRATIVE | Facility: OTHER | Age: 69
End: 2019-02-05

## 2019-02-05 NOTE — PROGRESS NOTES
Attempt #:  1 Follow up  This LMSW attempted to reach patient/caregiver to complete follow up and left a message requesting a return call.

## 2019-02-07 ENCOUNTER — OUTPATIENT CASE MANAGEMENT (OUTPATIENT)
Dept: ADMINISTRATIVE | Facility: OTHER | Age: 69
End: 2019-02-07

## 2019-02-08 NOTE — PROGRESS NOTES
Follow Up  Reason for referral: Nursing home placement    LMSW received incoming phone call from patient's daughter. Patient's daughter reports she visited [facility] on Monday. Patient's daughter reports she was advised that patient would not be appropriate for placement at [facility] since they did not have a locked unit and believed patient would need a facility with a lock unit. Patient's daughter reports she was referred to contact [facility] and another facility. Patient's daughter reports contacting both facilities recommended and was advised that both locations did not have bed availability in their locked/memory care units. Patient's daughter requested assistance identifying nursing facilities with memory care for patient. LMSW advised patient's daughter LMSW will assist her and work on identifying potential facilities with shelter beds and memory care. LMSW advised patient's daughter that LMSW will follow up with her on next week to provide update with search.     Plan: LMSW will follow up with patient's daughter on Monday, 2/11/2019 to provide update.

## 2019-02-11 ENCOUNTER — OUTPATIENT CASE MANAGEMENT (OUTPATIENT)
Dept: ADMINISTRATIVE | Facility: OTHER | Age: 69
End: 2019-02-11

## 2019-02-12 ENCOUNTER — OUTPATIENT CASE MANAGEMENT (OUTPATIENT)
Dept: ADMINISTRATIVE | Facility: OTHER | Age: 69
End: 2019-02-12

## 2019-02-20 NOTE — PROGRESS NOTES
Follow Up  Reason for referral: Nursing home placement    LMSW attempted to contact patient's daughter to follow up on nursing home placement and left voice message requesting call back.     LMSW contacted several nursing homes and started identifying nursing facilities with open beds. LMSW identified several nursing homes with senior care beds. Referrals for senior care care sent to [facility], [facility], [facility], and [facility].     Plan:   LMSW will follow up with nursing facilities and patient's caregiver on tomorrow.

## 2019-02-22 ENCOUNTER — OUTPATIENT CASE MANAGEMENT (OUTPATIENT)
Dept: ADMINISTRATIVE | Facility: OTHER | Age: 69
End: 2019-02-22

## 2019-02-22 NOTE — PROGRESS NOTES
Follow Up  Reason for referral: Nursing home placement    LMSW received incoming call from [place] with [facility] indicating she was interested in obtaining more information about patient's current behaviors and prescribed medications. LMSW advised [place] that patient do not have any current notes detailing her behaviors and description of her Alzheimer dementia diagnosis. LMSW advised [place] to contact patient's caregiver to obtain additional information on patient. Per Star she already attempted to contact patient's daughter and left message. LMSW informed Star LMSW will also notify caregiver of recent call from their facility.     LMSW received phone call from admission's coordinator at [facility] requesting additional information about patient. Admission's coordinator was encouraged to contact patient's caregiver to set up visit and or assessment.     LMSW later contacted patient's daughter and instructed her to return phone calls to both facilities who contacted her earlier. LMSW informed patient's caregiver that LMSW will follow up with her to find out status of referrals.     Plan:   LMSW will reach out to patient's caregiver on next week to determine whether family wants to move forward with any of the identified nursing home facilities.

## 2019-02-22 NOTE — PROGRESS NOTES
Follow Up  Reason for referral: Nursing home placement    LMSW contacted patient's daughter this morning to complete follow up. Patient's daughter reports she spoke to admissions representatives for both facilities and was informed that they did not have any open beds. LMSW informed patient's daughter that these facilities advised LMSW that they did have FDC beds available. Patient's daughter reports that she is tired of seeking placement in [place] and wants to pursue nursing home placement in [place]. Patient's daughter reports she has family in [place] and believes she may have more success over there. Patient's daughter advised to contact LMSW if any assistance needed in future. Patient's daughter voiced understanding and thanked [facility] for assistance provided. Case closed.

## 2019-03-10 DIAGNOSIS — I10 ESSENTIAL HYPERTENSION: ICD-10-CM

## 2019-03-11 RX ORDER — MEMANTINE HYDROCHLORIDE 10 MG/1
TABLET ORAL
Qty: 180 TABLET | Refills: 2 | Status: SHIPPED | OUTPATIENT
Start: 2019-03-11

## 2019-03-11 RX ORDER — LOSARTAN POTASSIUM AND HYDROCHLOROTHIAZIDE 25; 100 MG/1; MG/1
1 TABLET ORAL DAILY
Qty: 90 TABLET | Refills: 2 | Status: SHIPPED | OUTPATIENT
Start: 2019-03-11

## 2019-04-07 ENCOUNTER — HOSPITAL ENCOUNTER (INPATIENT)
Dept: HOSPITAL 5 - ED | Age: 69
LOS: 3 days | Discharge: HOSPICE | DRG: 73 | End: 2019-04-10
Attending: INTERNAL MEDICINE | Admitting: INTERNAL MEDICINE
Payer: MEDICARE

## 2019-04-07 DIAGNOSIS — E87.6: ICD-10-CM

## 2019-04-07 DIAGNOSIS — I10: ICD-10-CM

## 2019-04-07 DIAGNOSIS — Z79.82: ICD-10-CM

## 2019-04-07 DIAGNOSIS — D72.829: ICD-10-CM

## 2019-04-07 DIAGNOSIS — G90.9: Primary | ICD-10-CM

## 2019-04-07 DIAGNOSIS — E87.0: ICD-10-CM

## 2019-04-07 DIAGNOSIS — F03.90: ICD-10-CM

## 2019-04-07 DIAGNOSIS — G93.41: ICD-10-CM

## 2019-04-07 DIAGNOSIS — I95.9: ICD-10-CM

## 2019-04-07 DIAGNOSIS — E86.0: ICD-10-CM

## 2019-04-07 DIAGNOSIS — E05.90: ICD-10-CM

## 2019-04-07 LAB
ALBUMIN SERPL-MCNC: 4.2 G/DL (ref 3.9–5)
ALT SERPL-CCNC: 10 UNITS/L (ref 7–56)
BASOPHILS # (AUTO): 0 K/MM3 (ref 0–0.1)
BASOPHILS NFR BLD AUTO: 0.2 % (ref 0–1.8)
BUN SERPL-MCNC: 12 MG/DL (ref 7–17)
BUN/CREAT SERPL: 15 %
CALCIUM SERPL-MCNC: 9.2 MG/DL (ref 8.4–10.2)
EOSINOPHIL # BLD AUTO: 0.1 K/MM3 (ref 0–0.4)
EOSINOPHIL NFR BLD AUTO: 0.4 % (ref 0–4.3)
HCT VFR BLD CALC: 40.4 % (ref 30.3–42.9)
HEMOLYSIS INDEX: 25
HGB BLD-MCNC: 13.4 GM/DL (ref 10.1–14.3)
LYMPHOCYTES # BLD AUTO: 1.9 K/MM3 (ref 1.2–5.4)
LYMPHOCYTES NFR BLD AUTO: 13.4 % (ref 13.4–35)
MCHC RBC AUTO-ENTMCNC: 33 % (ref 30–34)
MCV RBC AUTO: 90 FL (ref 79–97)
MONOCYTES # (AUTO): 0.8 K/MM3 (ref 0–0.8)
MONOCYTES % (AUTO): 5.8 % (ref 0–7.3)
PLATELET # BLD: 204 K/MM3 (ref 140–440)
RBC # BLD AUTO: 4.48 M/MM3 (ref 3.65–5.03)

## 2019-04-07 PROCEDURE — 84436 ASSAY OF TOTAL THYROXINE: CPT

## 2019-04-07 PROCEDURE — 93010 ELECTROCARDIOGRAM REPORT: CPT

## 2019-04-07 PROCEDURE — 82140 ASSAY OF AMMONIA: CPT

## 2019-04-07 PROCEDURE — 80053 COMPREHEN METABOLIC PANEL: CPT

## 2019-04-07 PROCEDURE — 84132 ASSAY OF SERUM POTASSIUM: CPT

## 2019-04-07 PROCEDURE — 80048 BASIC METABOLIC PNL TOTAL CA: CPT

## 2019-04-07 PROCEDURE — 83735 ASSAY OF MAGNESIUM: CPT

## 2019-04-07 PROCEDURE — 84439 ASSAY OF FREE THYROXINE: CPT

## 2019-04-07 PROCEDURE — 81001 URINALYSIS AUTO W/SCOPE: CPT

## 2019-04-07 PROCEDURE — 85025 COMPLETE CBC W/AUTO DIFF WBC: CPT

## 2019-04-07 PROCEDURE — 84484 ASSAY OF TROPONIN QUANT: CPT

## 2019-04-07 PROCEDURE — 70450 CT HEAD/BRAIN W/O DYE: CPT

## 2019-04-07 PROCEDURE — 93005 ELECTROCARDIOGRAM TRACING: CPT

## 2019-04-07 PROCEDURE — 71045 X-RAY EXAM CHEST 1 VIEW: CPT

## 2019-04-07 PROCEDURE — 36415 COLL VENOUS BLD VENIPUNCTURE: CPT

## 2019-04-07 PROCEDURE — 87040 BLOOD CULTURE FOR BACTERIA: CPT

## 2019-04-07 PROCEDURE — 84443 ASSAY THYROID STIM HORMONE: CPT

## 2019-04-07 RX ADMIN — CEFTRIAXONE SODIUM SCH MLS/HR: 1 INJECTION, POWDER, FOR SOLUTION INTRAMUSCULAR; INTRAVENOUS at 23:40

## 2019-04-07 NOTE — XRAY REPORT
PROCEDURE: XR CHEST 1V AP 

 

HISTORY: Altered Mental Status 

 

FINDINGS: Single frontal view of the chest was acquired. There is cardiomegaly. There is no evidence 
of congestive heart failure. There is no consolidative pulmonary infiltrate. 

 

IMPRESSION: Cardiomegaly 

 

This document is electronically signed by Stanley Harding MD., April 7 2019 09:48:23 PM ET

## 2019-04-07 NOTE — HISTORY AND PHYSICAL REPORT
History of Present Illness


Date of examination: 04/07/19


History of present illness: 


69-year-old woman with a history of hypertension, dementia, hyperthyroidism was 

brought to the emergency room because she slumped over at a family can arrange. 

Her blood pressure was noted to be 60/30, shows brought to the emergency room, 

given 2 L of IV fluids to which she responded.  Patient is on 4 

antihypertensives, she is unable to give a history, history is per the son at 

bedside.  Review of systems unobtainable secondary to dementia





PAST MEDICAL HISTORY: hypertension, dementia, hyperthyroidism 





PAST SURGICAL HISTORY: none





SOCIAL HISTORY: Denies alcohol,  drugs, tobacco





FAMILY HISTORY: Hypertension








Medications and Allergies


                                    Allergies











Allergy/AdvReac Type Severity Reaction Status Date / Time


 


No Known Allergies Allergy   Verified 04/07/19 18:27











                                Home Medications











 Medication  Instructions  Recorded  Confirmed  Last Taken  Type


 


Amlodipine Besylate [Norvasc] 10 mg PO DAILY 04/07/19 04/07/19 Unknown History


 


Aspirin [Children's Aspirin] 81 mg PO DAILY 04/07/19 04/07/19 Unknown History


 


LORazepam [Ativan] 1 mg PO DAILY 04/07/19 04/07/19 Unknown History


 


Losartan [Cozaar] 100 mg PO DAILY 04/07/19 04/07/19 Unknown History


 


Metoprolol [Lopressor TAB] 25 mg PO DAILY 04/07/19 04/07/19 Unknown History


 


Quetiapine Fumarate [SEROquel] 100 mg PO BID 04/07/19 04/07/19 Unknown History


 


Sertraline [Zoloft] 100 mg PO DAILY 04/07/19 04/07/19 Unknown History


 


cloNIDine [Catapres] 0.1 mg PO BID 04/07/19 04/07/19 Unknown History


 


methIMAzole [Tapazole] 5 mg PO TID 04/07/19 04/07/19 Unknown History


 


risperiDONE [RisperDAL] 1 mg PO DAILY 04/07/19 04/07/19 Unknown History


 


traZODone [Desyrel] 50 mg PO QHS 04/07/19 04/07/19 Unknown History














Exam





- Physical Exam


Narrative exam: 


General Apperance: The patient lying in bed,  breathing comfortable 


HEENT: Normocephalic, atraumatic.  Pupils equally round and reactive to light, 

EOMI, no sclericterus or JVD or thyromegaly or nodule.  , no carotid bruit, 

mucous membranes moist, no exudate or erythema


Heart: S1-S2, regular is rhythm


Lungs: Clear to auscultation bilaterally, breathing comfortable


Abdomen: Positive bowel sounds, soft, nontender, nondistended, no organomegaly


Extremities: No edema cyanosis clubbing


Skin: no rash, nodule, warm and dry


Neuro: cranial nerves 2-12 intact, speech is fluent, motor/sensory intact








- Constitutional


Vitals: 


                                        











Temp Pulse Resp BP Pulse Ox


 


 98.0 F   79   23   121/66   99 


 


 04/07/19 21:00  04/07/19 22:00  04/07/19 22:00  04/07/19 22:00  04/07/19 21:46














Results





- Labs


CBC & Chem 7: 


                                 04/07/19 18:58





                                 04/07/19 18:38


Labs: 


                              Abnormal lab results











  04/07/19 04/07/19 Range/Units





  18:38 18:58 


 


WBC   14.5 H  (4.5-11.0)  K/mm3


 


Seg Neutrophils %   80.2 H  (40.0-70.0)  %


 


Seg Neutrophils #   11.6 H  (1.8-7.7)  K/mm3


 


Sodium  146 H   (137-145)  mmol/L


 


Potassium  3.5 L   (3.6-5.0)  mmol/L


 


Glucose  119 H   ()  mg/dL














- Imaging and Cardiology


EKG: image reviewed


Chest x-ray: report reviewed


CT Scan - head: report reviewed





Assessment and Plan


Assessment


Hypotension, rule out infection


Leukocytosis


dementia


hyperthyroidism 





Plan


Admit to medicine


Continue IV fluids, check blood cultures, urinalysis


Start IV Rocephin, hold antihypertensives


Continue appropriate outpatient medications


DVT prophylaxis

## 2019-04-07 NOTE — EMERGENCY DEPARTMENT REPORT
ED Altered Mental Status HPI





- General


Stated Complaint: AMS/LOW BP


Time Seen by Provider: 04/07/19 18:04


Source: EMS


Mode of arrival: Stretcher


Limitations: Altered Mental Status





- History of Present Illness


Initial Comments: 





Patient is a 69-year-old female that presents emergency room with complaints of 

hypotension and altered mental status.  Patient is currently on hospice for 

advanced dementia.  Patient lives in a personal care home.  No family is 

present.  Patient is A&O 0.  Vision brought in by EMS.  EMS report received.  

EMS states the patient was initially unresponsive and had a very low blood 

pressure.  Patient given fluids and placed in Trendelenburg and became more 

arousable.  At this time patient is talking but does not answer questions 

appropriately.  Patient started new medication of Ativan and Seroquel 2 days 

ago.  History received from EMS.  Patient is on hospice but is a FULL code


MD Complaint: altered mental status, decreased responsiveness


-: Sudden


Consistency of Symptoms: waxing and waning


Associated Symptoms: denies other symptoms





- Related Data


                                Home Medications











 Medication  Instructions  Recorded  Confirmed  Last Taken


 


Amlodipine Besylate [Norvasc] 10 mg PO DAILY 04/07/19 04/07/19 Unknown


 


Aspirin [Children's Aspirin] 81 mg PO DAILY 04/07/19 04/07/19 Unknown


 


LORazepam [Ativan] 1 mg PO DAILY 04/07/19 04/07/19 Unknown


 


Losartan [Cozaar] 100 mg PO DAILY 04/07/19 04/07/19 Unknown


 


Metoprolol [Lopressor TAB] 25 mg PO DAILY 04/07/19 04/07/19 Unknown


 


Quetiapine Fumarate [SEROquel] 100 mg PO BID 04/07/19 04/07/19 Unknown


 


Sertraline [Zoloft] 100 mg PO DAILY 04/07/19 04/07/19 Unknown


 


cloNIDine [Catapres] 0.1 mg PO BID 04/07/19 04/07/19 Unknown


 


methIMAzole [Tapazole] 5 mg PO TID 04/07/19 04/07/19 Unknown


 


risperiDONE [RisperDAL] 1 mg PO DAILY 04/07/19 04/07/19 Unknown


 


traZODone [Desyrel] 50 mg PO QHS 04/07/19 04/07/19 Unknown











                                    Allergies











Allergy/AdvReac Type Severity Reaction Status Date / Time


 


No Known Allergies Allergy   Verified 04/07/19 18:27














ED Review of Systems


ROS: 


Stated complaint: AMS/LOW BP


Other details as noted in HPI





Comment: Unobtainable due to pts medical conditions





ED Past Medical Hx





- Past Medical History


Previous Medical History?: Yes


Hx Hypertension: Yes


Hx Psychiatric Treatment: Yes


Hx Dementia: Yes





- Surgical History


Past Surgical History?: No





- Family History


Family history: no significant





- Social History


Smoking Status: Unknown if ever smoked


Substance Use Type: None





- Medications


Home Medications: 


                                Home Medications











 Medication  Instructions  Recorded  Confirmed  Last Taken  Type


 


Amlodipine Besylate [Norvasc] 10 mg PO DAILY 04/07/19 04/07/19 Unknown History


 


Aspirin [Children's Aspirin] 81 mg PO DAILY 04/07/19 04/07/19 Unknown History


 


LORazepam [Ativan] 1 mg PO DAILY 04/07/19 04/07/19 Unknown History


 


Losartan [Cozaar] 100 mg PO DAILY 04/07/19 04/07/19 Unknown History


 


Metoprolol [Lopressor TAB] 25 mg PO DAILY 04/07/19 04/07/19 Unknown History


 


Quetiapine Fumarate [SEROquel] 100 mg PO BID 04/07/19 04/07/19 Unknown History


 


Sertraline [Zoloft] 100 mg PO DAILY 04/07/19 04/07/19 Unknown History


 


cloNIDine [Catapres] 0.1 mg PO BID 04/07/19 04/07/19 Unknown History


 


methIMAzole [Tapazole] 5 mg PO TID 04/07/19 04/07/19 Unknown History


 


risperiDONE [RisperDAL] 1 mg PO DAILY 04/07/19 04/07/19 Unknown History


 


traZODone [Desyrel] 50 mg PO QHS 04/07/19 04/07/19 Unknown History














ED Physical Exam





- General


Limitations: Altered Mental Status


General appearance: alert, in no apparent distress





- Head


Head exam: Present: atraumatic, normocephalic





- Eye


Eye exam: Present: normal appearance, PERRL


Pupils: Present: normal accommodation





- ENT


ENT exam: Present: mucous membranes dry





- Neck


Neck exam: Present: normal inspection





- Respiratory


Respiratory exam: Present: normal lung sounds bilaterally.  Absent: respiratory 

distress





- Cardiovascular


Cardiovascular Exam: Present: regular rate, normal rhythm.  Absent: systolic 

murmur, diastolic murmur, rubs, gallop





- GI/Abdominal


GI/Abdominal exam: Present: soft, normal bowel sounds





- Rectal


Rectal exam: Present: deferred





- Extremities Exam


Extremities exam: Present: normal inspection





- Back Exam


Back exam: Present: normal inspection





- Neurological Exam


Neurological exam: Present: alert, altered





- Skin


Skin exam: Present: warm, dry, intact, normal color.  Absent: rash





- Assessment


Assessment Interval: Baseline





- Level of Consciousness


1a. Level of Consciousness: alert/keenly responsive





- LOC Questions


1b. LOC Questions: answers 1 question correctly





- LOC Command


1c. LOC Commands: performs tasks correctly





- Best Gaze


2. Best Gaze: normal





- Visual


3. Visual: no visual loss





- Facial Palsy


4. Facial Palsy: normal symmetrical movement





- Motor Arm


5a. Motor Arm Left: no drift


5b. Motor Arm Right: no drift





- Motor Leg


6a. Motor Leg Left: no drift


6b. Motor Leg Right: no drift





- Limb Ataxia


7. Limb Ataxia: absent





- Sensory


8. Sensory: normal





- Best Language


9. Best Language: no aphasia





- Dysarthria


10. Dysarthria: normal





- Extinction and Inattention


11. Extinction/Inattention: no abnormality





- Scoring


Total Score: 1


Stroke Severity: Minor Stroke





ED Course


                                   Vital Signs











  04/07/19 04/07/19 04/07/19





  18:15 18:16 18:26


 


Temperature  97.4 F L 


 


Pulse Rate 126 H 82 


 


Respiratory 15 10 L 





Rate   


 


Blood Pressure  106/85 


 


O2 Sat by Pulse   96





Oximetry   














  04/07/19 04/07/19 04/07/19





  18:30 18:45 18:49


 


Temperature   


 


Pulse Rate 82 73 


 


Respiratory 13 10 L 10 L





Rate   


 


Blood Pressure 106/85 91/59 


 


O2 Sat by Pulse 95 98 98





Oximetry   














  04/07/19 04/07/19 04/07/19





  19:00 19:15 19:30


 


Temperature   


 


Pulse Rate 91 H 95 H 89


 


Respiratory 14 17 13





Rate   


 


Blood Pressure 91/59 114/63 115/67


 


O2 Sat by Pulse 100 98 99





Oximetry   














  04/07/19 04/07/19 04/07/19





  19:45 20:38 20:46


 


Temperature   


 


Pulse Rate 85 115 H 90


 


Respiratory 12 23 18





Rate   


 


Blood Pressure 112/61  110/55


 


O2 Sat by Pulse 96  





Oximetry   














  04/07/19 04/07/19 04/07/19





  21:00 21:16 21:30


 


Temperature 98.0 F  


 


Pulse Rate 82 92 H 86


 


Respiratory 22 13 13





Rate   


 


Blood Pressure 113/68 113/68 113/68


 


O2 Sat by Pulse 100 99 100





Oximetry   














  04/07/19 04/07/19





  21:46 22:00


 


Temperature  


 


Pulse Rate 94 H 79


 


Respiratory 21 23





Rate  


 


Blood Pressure 113/68 121/66


 


O2 Sat by Pulse 99 





Oximetry  














- Reevaluation(s)


Reevaluation #1: 





The pressure is improving with fluids.  We'll continue to monitor fluids.  

Patient is becoming more verbal.


04/07/19 18:52








Patient's blood pressure is improving.  Patient more alert.


04/07/19 19:53





Reevaluation #2: 





Patient admitted to the hospitalist service.  Discussed plan of care with family

 and patient.  Both agree with the plan of care.


04/07/19 21:04








- Consultations


Consultation #1: 


Hospitalist consultation for admission.  Hospitalist to admit patient


04/07/19 21:05








- Lab Data


Result diagrams: 


                                 04/07/19 18:58





                                 04/07/19 18:38


                                   Lab Results











  04/07/19 04/07/19 04/07/19 Range/Units





  18:38 18:58 18:58 


 


WBC    14.5 H  (4.5-11.0)  K/mm3


 


RBC    4.48  (3.65-5.03)  M/mm3


 


Hgb    13.4  (10.1-14.3)  gm/dl


 


Hct    40.4  (30.3-42.9)  %


 


MCV    90  (79-97)  fl


 


MCH    30  (28-32)  pg


 


MCHC    33  (30-34)  %


 


RDW    13.6  (13.2-15.2)  %


 


Plt Count    204  (140-440)  K/mm3


 


Lymph % (Auto)    13.4  (13.4-35.0)  %


 


Mono % (Auto)    5.8  (0.0-7.3)  %


 


Eos % (Auto)    0.4  (0.0-4.3)  %


 


Baso % (Auto)    0.2  (0.0-1.8)  %


 


Lymph #    1.9  (1.2-5.4)  K/mm3


 


Mono #    0.8  (0.0-0.8)  K/mm3


 


Eos #    0.1  (0.0-0.4)  K/mm3


 


Baso #    0.0  (0.0-0.1)  K/mm3


 


Seg Neutrophils %    80.2 H  (40.0-70.0)  %


 


Seg Neutrophils #    11.6 H  (1.8-7.7)  K/mm3


 


Sodium  146 H    (137-145)  mmol/L


 


Potassium  3.5 L    (3.6-5.0)  mmol/L


 


Chloride  106.8    ()  mmol/L


 


Carbon Dioxide  27    (22-30)  mmol/L


 


Anion Gap  16    mmol/L


 


BUN  12    (7-17)  mg/dL


 


Creatinine  0.8    (0.7-1.2)  mg/dL


 


Estimated GFR  > 60    ml/min


 


BUN/Creatinine Ratio  15    %


 


Glucose  119 H    ()  mg/dL


 


Lactic Acid   2.00   (0.7-2.0)  mmol/L


 


Calcium  9.2    (8.4-10.2)  mg/dL


 


Total Bilirubin  0.30    (0.1-1.2)  mg/dL


 


AST  16    (5-40)  units/L


 


ALT  10    (7-56)  units/L


 


Alkaline Phosphatase  62    ()  units/L


 


Troponin T  < 0.010    (0.00-0.029)  ng/mL


 


Total Protein  7.2    (6.3-8.2)  g/dL


 


Albumin  4.2    (3.9-5)  g/dL


 


Albumin/Globulin Ratio  1.4    %














- EKG Data


-: EKG Interpreted by Me


EKG shows normal: sinus rhythm, axis, intervals, QRS complexes, ST-T waves


Rate: normal





- Radiology Data


Radiology results: report reviewed, image reviewed


interpreted by me: 





Negative chest x-ray except for cardiomegaly








PROCEDURE: CT head without contrast. 





TECHNIQUE: Computerized tomography of the head was performed without contrast 

material. 





CT DOSE LENGTH PRODUCT: 1048.44 mGycm 





HISTORY: Altered mental status. 





COMPARISONS: None. 





FINDINGS: 





There is mild cerebral atrophy. There is mild evidence of chronic ischemic white

 matter disease. 


There are no mass lesions. There is no intracranial hemorrhage. The calvarium 

appears intact. The 


mastoid air cells and visualized paranasal sinuses are well aerated. 





IMPRESSION: Normal study for age




















PROCEDURE: XR CHEST 1V AP 





HISTORY: Altered Mental Status 





FINDINGS: Single frontal view of the chest was acquired. There is cardiomegaly. 

There is no 


evidence of congestive heart failure. There is no consolidative pulmonary 

infiltrate. 





IMPRESSION: Cardiomegaly 














- Medical Decision Making





Patient is a 69-year-old female that presents emergency room with low blood 

pressure and altered mental status.  Patient will be admitted to the hospitalist

 service.  Patient found to have hypotension which responded well to fluids.  

Patient became more alert after fluids and increased blood pressure.  Patient's 

hypotension most likely secondary to a med reaction or medical interaction of 

Ativan Seroquel and her blood pressure medications.





- Differential Diagnosis


hypotension. med reaction. med interation. 


Critical Care Time: Yes


Critical care attestation.: 


If time is entered above; I have spent that time in minutes in the direct care 

of this critically ill patient, excluding procedure time.





Critical Care Time: 





45 minutes





ED Disposition


Clinical Impression: 


 Unresponsive





Hypotension


Qualifiers:


 Hypotension type: unspecified hypotension type Qualified Code(s): I95.9 - 

Hypotension, unspecified





Medication reaction


Qualifiers:


 Encounter type: initial encounter Qualified Code(s): T50.905A - Adverse effect 

of unspecified drugs, medicaments and biological substances, initial encounter





Altered mental state


Qualifiers:


 Altered mental status type: unspecified Qualified Code(s): R41.82 - Altered 

mental status, unspecified





Disposition: DC-09 OP ADMIT IP TO THIS HOSP


Is pt being admited?: Yes


Does the pt Need Aspirin: No


Condition: Critical


Time of Disposition: 21:55

## 2019-04-07 NOTE — CAT SCAN REPORT
PROCEDURE:  CT head without contrast. 

 

TECHNIQUE:  Computerized tomography of the head was performed without contrast material.  

 

CT DOSE LENGTH PRODUCT:  1048.44  mGycm 

 

HISTORY: Altered mental status. 

 

COMPARISONS:  None. 

 

FINDINGS: 

 

There is mild cerebral atrophy. There is mild evidence of chronic ischemic white matter disease. Ther
e are no mass lesions. There is no intracranial hemorrhage. The calvarium appears intact. The mastoid
 air cells and visualized paranasal sinuses are well aerated. 

 

IMPRESSION:  Normal study for age. 

 

This document is electronically signed by Genaro Sanford MD., April 7 2019 08:56:55 PM ET

## 2019-04-08 LAB
BASOPHILS # (AUTO): 0 K/MM3 (ref 0–0.1)
BASOPHILS NFR BLD AUTO: 0.4 % (ref 0–1.8)
BUN SERPL-MCNC: 9 MG/DL (ref 7–17)
BUN/CREAT SERPL: 18 %
CALCIUM SERPL-MCNC: 9.1 MG/DL (ref 8.4–10.2)
EOSINOPHIL # BLD AUTO: 0 K/MM3 (ref 0–0.4)
EOSINOPHIL NFR BLD AUTO: 0.3 % (ref 0–4.3)
HCT VFR BLD CALC: 34.4 % (ref 30.3–42.9)
HEMOLYSIS INDEX: 4
HGB BLD-MCNC: 11.5 GM/DL (ref 10.1–14.3)
LYMPHOCYTES # BLD AUTO: 1.8 K/MM3 (ref 1.2–5.4)
LYMPHOCYTES NFR BLD AUTO: 21.2 % (ref 13.4–35)
MCHC RBC AUTO-ENTMCNC: 34 % (ref 30–34)
MCV RBC AUTO: 89 FL (ref 79–97)
MONOCYTES # (AUTO): 0.5 K/MM3 (ref 0–0.8)
MONOCYTES % (AUTO): 6.1 % (ref 0–7.3)
PLATELET # BLD: 197 K/MM3 (ref 140–440)
RBC # BLD AUTO: 3.88 M/MM3 (ref 3.65–5.03)

## 2019-04-08 RX ADMIN — Medication SCH ML: at 21:40

## 2019-04-08 RX ADMIN — METHIMAZOLE SCH MG: 5 TABLET ORAL at 17:45

## 2019-04-08 RX ADMIN — METHIMAZOLE SCH: 5 TABLET ORAL at 08:00

## 2019-04-08 RX ADMIN — ENOXAPARIN SODIUM SCH MG: 100 INJECTION SUBCUTANEOUS at 10:45

## 2019-04-08 RX ADMIN — ASPIRIN SCH MG: 81 TABLET, CHEWABLE ORAL at 10:29

## 2019-04-08 RX ADMIN — CEFTRIAXONE SODIUM SCH MLS/HR: 1 INJECTION, POWDER, FOR SOLUTION INTRAMUSCULAR; INTRAVENOUS at 10:37

## 2019-04-08 RX ADMIN — LORAZEPAM SCH MG: 1 TABLET ORAL at 10:29

## 2019-04-08 RX ADMIN — METHIMAZOLE SCH MG: 5 TABLET ORAL at 21:40

## 2019-04-08 RX ADMIN — SERTRALINE SCH MG: 100 TABLET, FILM COATED ORAL at 10:30

## 2019-04-09 RX ADMIN — Medication SCH: at 21:55

## 2019-04-09 RX ADMIN — ASPIRIN SCH MG: 81 TABLET, CHEWABLE ORAL at 11:12

## 2019-04-09 RX ADMIN — CEFTRIAXONE SODIUM SCH MLS/HR: 1 INJECTION, POWDER, FOR SOLUTION INTRAMUSCULAR; INTRAVENOUS at 11:09

## 2019-04-09 RX ADMIN — LORAZEPAM SCH MG: 1 TABLET ORAL at 11:11

## 2019-04-09 RX ADMIN — SERTRALINE SCH MG: 100 TABLET, FILM COATED ORAL at 11:10

## 2019-04-09 RX ADMIN — METHIMAZOLE SCH MG: 5 TABLET ORAL at 20:56

## 2019-04-09 RX ADMIN — SODIUM CHLORIDE SCH MLS/HR: 0.9 INJECTION, SOLUTION INTRAVENOUS at 06:27

## 2019-04-09 RX ADMIN — ENOXAPARIN SODIUM SCH MG: 100 INJECTION SUBCUTANEOUS at 11:10

## 2019-04-09 RX ADMIN — METHIMAZOLE SCH MG: 5 TABLET ORAL at 14:31

## 2019-04-09 RX ADMIN — Medication SCH: at 11:12

## 2019-04-09 RX ADMIN — SODIUM CHLORIDE SCH MLS/HR: 0.9 INJECTION, SOLUTION INTRAVENOUS at 18:54

## 2019-04-09 RX ADMIN — METHIMAZOLE SCH MG: 5 TABLET ORAL at 11:11

## 2019-04-09 RX ADMIN — Medication SCH ML: at 21:48

## 2019-04-09 NOTE — PROGRESS NOTE
Assessment and Plan


Assessment and plan: 





69F w pmh of htn, dementia, hyperthyroidism, who was found slumpoed over by 

family. She was noted to have low BP. She was on 4 BP meds at home





Diagnosis


Hypotension


Transient Autonomic Dysfunction


Hx of htn


hyperthryoidism


dementia


hypokalemia


hypernatremia/free water deficit/dehydration


acute metabolic encephalopathy





Plan


Bp meds on hold


-Sp IVF


-Infection w/o neg so far, CXR and BC neg, obtain UA


-check TFTs


-fup repeat K, check Mg


-Na normalized after IVF


-dvt ppx- lovenox





History


Interval history: 





remains confused


Review of systems


Constitutional: No fevers, no malaise, no joint pains





CVS: No chest pain, no orthopnea, no dyspnea on exertion, no pedal edema





GI: No abdominal pain, no diarrhea, no vomiting, no constipation





Respiratory: No shortness of breath, no wheezing, no coughing





Hospitalist Physical





- Physical exam


Narrative exam: 





General.: Appears well, no distress, nontoxic


HEENT: Moist mucous membranes, extraocular muscles intact, no lymphadenopathy


Neck: supple


Cardiac: S1-S2 heard


Lungs: clear to auscultation bilaterally


Abdomen: soft , nontender,  nondistended,  bowel sounds positive


Extremities: no edema clubbing or cyanosis


Skin: no rash or lesions


Neurologic: no gross focal deficits, demented


Psych: calm, and cooperative





- Constitutional


Vitals: 


                                        











Temp Pulse Resp BP Pulse Ox


 


 98.6 F   63   18   138/72   98 


 


 19 07:49  19 07:49  19 07:49  19 07:49  19 07:49














Results





- Labs


CBC & Chem 7: 


                                 19 06:46





                                 19 11:21


Labs: 


                             Laboratory Last Values











WBC  8.4 K/mm3 (4.5-11.0)   19  06:46    


 


RBC  3.88 M/mm3 (3.65-5.03)   19  06:46    


 


Hgb  11.5 gm/dl (10.1-14.3)   19  06:46    


 


Hct  34.4 % (30.3-42.9)  D 19  06:46    


 


MCV  89 fl (79-97)   19  06:46    


 


MCH  30 pg (28-32)   19  06:46    


 


MCHC  34 % (30-34)   19  06:46    


 


RDW  13.1 % (13.2-15.2)  L  19  06:46    


 


Plt Count  197 K/mm3 (140-440)   19  06:46    


 


Lymph % (Auto)  21.2 % (13.4-35.0)   19  06:46    


 


Mono % (Auto)  6.1 % (0.0-7.3)   19  06:46    


 


Eos % (Auto)  0.3 % (0.0-4.3)   19  06:46    


 


Baso % (Auto)  0.4 % (0.0-1.8)   19  06:46    


 


Lymph #  1.8 K/mm3 (1.2-5.4)   19  06:46    


 


Mono #  0.5 K/mm3 (0.0-0.8)   19  06:46    


 


Eos #  0.0 K/mm3 (0.0-0.4)   19  06:46    


 


Baso #  0.0 K/mm3 (0.0-0.1)   19  06:46    


 


Seg Neutrophils %  72.0 % (40.0-70.0)  H  19  06:46    


 


Seg Neutrophils #  6.0 K/mm3 (1.8-7.7)   19  06:46    


 


Sodium  144 mmol/L (137-145)   19  06:46    


 


Potassium  3.3 mmol/L (3.6-5.0)  L  19  06:46    


 


Chloride  106.9 mmol/L ()   19  06:46    


 


Carbon Dioxide  25 mmol/L (22-30)   19  06:46    


 


Anion Gap  15 mmol/L  19  06:46    


 


BUN  9 mg/dL (7-17)   19  06:46    


 


Creatinine  0.5 mg/dL (0.7-1.2)  L  19  06:46    


 


Estimated GFR  > 60 ml/min  19  06:46    


 


BUN/Creatinine Ratio  18 %  19  06:46    


 


Glucose  103 mg/dL ()  H  19  06:46    


 


Lactic Acid  2.00 mmol/L (0.7-2.0)   19  18:58    


 


Calcium  9.1 mg/dL (8.4-10.2)   19  06:46    


 


Total Bilirubin  0.30 mg/dL (0.1-1.2)   19  18:38    


 


AST  16 units/L (5-40)   19  18:38    


 


ALT  10 units/L (7-56)   19  18:38    


 


Alkaline Phosphatase  62 units/L ()   19  18:38    


 


Troponin T  < 0.010 ng/mL (0.00-0.029)   19  18:38    


 


Total Protein  7.2 g/dL (6.3-8.2)   19  18:38    


 


Albumin  4.2 g/dL (3.9-5)   19  18:38    


 


Albumin/Globulin Ratio  1.4 %  19  18:38    














Active Medications





- Current Medications


Current Medications: 














Generic Name Dose Route Start Last Admin





  Trade Name Freq  PRN Reason Stop Dose Admin


 


Acetaminophen  650 mg  19 22:49 





  Tylenol  PO  





  Q4H PRN  





  Pain MILD(1-3)/Fever >100.5/HA  


 


Aspirin  81 mg  19 10:00  19 10:29





  Baby Aspirin  PO   81 mg





  DAILY MARY   Administration


 


Enoxaparin Sodium  40 mg  19 10:00  19 10:45





  Lovenox  SUB-Q   40 mg





  QDAY MARY   Administration


 


Ceftriaxone Sodium  1 gm in 50 mls @ 100 mls/hr  19 22:49  19 10:37





  Rocephin/Ns 1 Gm/50 Ml  IV   100 mls/hr





  Q24HR MARY   Administration





  Protocol  


 


Sodium Chloride  1,000 mls @ 75 mls/hr  19 23:00  19 06:27





  Nacl 0.9% 1000 Ml  IV   75 mls/hr





  AS DIRECT MARY   Administration


 


Lorazepam  1 mg  19 10:00  19 10:29





  Ativan  PO   1 mg





  DAILY MARY   Administration


 


Methimazole  5 mg  19 08:00  19 21:40





  Tapazole  PO   5 mg





  TID MARY   Administration


 


Ondansetron HCl  4 mg  19 22:49 





  Zofran  IV  





  Q8H PRN  





  Nausea And Vomiting  


 


Risperidone  1 mg  19 10:00  19 10:30





  Risperdal  PO   1 mg





  DAILY MARY   Administration


 


Sertraline HCl  100 mg  19 10:00  19 10:30





  Zoloft  PO   100 mg





  DAILY MARY   Administration


 


Sodium Chloride  10 ml  19 10:00  19 21:40





  Sodium Chloride Flush Syringe 10 Ml  IV   10 ml





  BID MARY   Administration


 


Sodium Chloride  10 ml  19 22:49 





  Sodium Chloride Flush Syringe 10 Ml  IV  





  PRN PRN  





  LINE FLUSH  














Nutrition/Malnutrition Assess





- Dietary Evaluation


Nutrition/Malnutrition Findings: 


                                        





Nutrition Notes                                            Start:  19 

16:18


Freq:                                                      Status: Active       




Protocol:                                                                       




 Document     19 16:18  [facility]  (Rec: 19 16:21  [facility]  [id number]

[email or web address])


 Nutrition Notes


     Need for Assessment generated from:         RN Referral,MST


     Initial or Follow up                        Assessment


     Current Diagnosis                           Hypertension


     Other Pertinent Diagnosis                   Hypotension, Dementia


     Current Diet                                Cardiac


     Labs/Tests                                  K 3.3


     Pertinent Medications                       Reviewed


     Height                                      5 ft 5 in


     Weight                                      68 kg


     Ideal Body Weight (kg)                      56.81


     BMI                                         24.9


     Weight Status                               Appropriate


     Subjective/Other Information                Pt screened for malnutrition


                                                 and skin risks (Manuel score


                                                 unavailable).  Per RN, pt


                                                 agitated and very confused.


     Burn                                        Absent


     Trauma                                      Absent


     #1


      Nutrition Diagnosis                        Predicted suboptimal energy


                                                 intake


      Etiology                                   dementia, confusion


      As Evidenced by Signs and Symptoms         pt with bilat wrist restraints


     Is patient on ventilator?                   No


     Is Patient Ambulatory and/or Out of Bed     No


     REE-([place]-confined to bed)      [phone]


     Calculation Used for Recommendations        [place]


     Additional Notes                            Pro needs 1-1.2g/k-82g/


                                                 day


                                                 Fluid needs 1ml/kcal


 Nutrition Intervention


     Change Diet Order:                          Continue current diet order


     Goal #1                                     PO intakes to meet at least 75


                                                 % energy and pro needs


     Anticipated Discharge Needs:                Unable to identify at this


                                                 time


     Follow-Up By:                               04/10/19


     Additional Comments                         F/U: intakes

## 2019-04-09 NOTE — PROGRESS NOTE
Assessment and Plan


Assessment and plan: 





69F w pmh of htn, dementia, hyperthyroidism, who was found slumpoed over by 

family. She was noted to have low BP. She was on 4 BP meds at home





Diagnosis


Hypotension


Transient Autonomic Dysfunction


Hx of htn


hyperthryoidism


dementia


hypokalemia


hypernatremia/free water deficit/dehydration





Plan


Bp meds on hold


-Sp IVF


-Infection w/o neg so far, CXR and BC neg, obtain UA


-check TFTs


-fup repeat K, check Mg


-Na normalized after IVF


-dvt ppx- lovenox





History


Interval history: 





remains confused


Review of systems


Constitutional: No fevers, no malaise, no joint pains





CVS: No chest pain, no orthopnea, no dyspnea on exertion, no pedal edema





GI: No abdominal pain, no diarrhea, no vomiting, no constipation





Respiratory: No shortness of breath, no wheezing, no coughing





Hospitalist Physical





- Physical exam


Narrative exam: 





General.: Appears well, no distress, nontoxic


HEENT: Moist mucous membranes, extraocular muscles intact, no lymphadenopathy


Neck: supple


Cardiac: S1-S2 heard


Lungs: clear to auscultation bilaterally


Abdomen: soft , nontender,  nondistended,  bowel sounds positive


Extremities: no edema clubbing or cyanosis


Skin: no rash or lesions


Neurologic: no gross focal deficits, lazy


Psych: calm, and cooperative





- Constitutional


Vitals: 


                                        











Temp Pulse Resp BP Pulse Ox


 


 98.6 F   63   18   138/72   98 


 


 19 07:49  19 07:49  19 07:49  19 07:49  19 07:49














Results





- Labs


CBC & Chem 7: 


                                 19 06:46





                                 19 06:46


Labs: 


                             Laboratory Last Values











WBC  8.4 K/mm3 (4.5-11.0)   19  06:46    


 


RBC  3.88 M/mm3 (3.65-5.03)   19  06:46    


 


Hgb  11.5 gm/dl (10.1-14.3)   19  06:46    


 


Hct  34.4 % (30.3-42.9)  D 19  06:46    


 


MCV  89 fl (79-97)   19  06:46    


 


MCH  30 pg (28-32)   19  06:46    


 


MCHC  34 % (30-34)   19  06:46    


 


RDW  13.1 % (13.2-15.2)  L  19  06:46    


 


Plt Count  197 K/mm3 (140-440)   19  06:46    


 


Lymph % (Auto)  21.2 % (13.4-35.0)   19  06:46    


 


Mono % (Auto)  6.1 % (0.0-7.3)   19  06:46    


 


Eos % (Auto)  0.3 % (0.0-4.3)   19  06:46    


 


Baso % (Auto)  0.4 % (0.0-1.8)   19  06:46    


 


Lymph #  1.8 K/mm3 (1.2-5.4)   19  06:46    


 


Mono #  0.5 K/mm3 (0.0-0.8)   19  06:46    


 


Eos #  0.0 K/mm3 (0.0-0.4)   19  06:46    


 


Baso #  0.0 K/mm3 (0.0-0.1)   19  06:46    


 


Seg Neutrophils %  72.0 % (40.0-70.0)  H  19  06:46    


 


Seg Neutrophils #  6.0 K/mm3 (1.8-7.7)   19  06:46    


 


Sodium  144 mmol/L (137-145)   19  06:46    


 


Potassium  3.3 mmol/L (3.6-5.0)  L  19  06:46    


 


Chloride  106.9 mmol/L ()   19  06:46    


 


Carbon Dioxide  25 mmol/L (22-30)   19  06:46    


 


Anion Gap  15 mmol/L  19  06:46    


 


BUN  9 mg/dL (7-17)   19  06:46    


 


Creatinine  0.5 mg/dL (0.7-1.2)  L  19  06:46    


 


Estimated GFR  > 60 ml/min  19  06:46    


 


BUN/Creatinine Ratio  18 %  19  06:46    


 


Glucose  103 mg/dL ()  H  19  06:46    


 


Lactic Acid  2.00 mmol/L (0.7-2.0)   19  18:58    


 


Calcium  9.1 mg/dL (8.4-10.2)   19  06:46    


 


Total Bilirubin  0.30 mg/dL (0.1-1.2)   19  18:38    


 


AST  16 units/L (5-40)   19  18:38    


 


ALT  10 units/L (7-56)   19  18:38    


 


Alkaline Phosphatase  62 units/L ()   19  18:38    


 


Troponin T  < 0.010 ng/mL (0.00-0.029)   19  18:38    


 


Total Protein  7.2 g/dL (6.3-8.2)   19  18:38    


 


Albumin  4.2 g/dL (3.9-5)   19  18:38    


 


Albumin/Globulin Ratio  1.4 %  19  18:38    














Active Medications





- Current Medications


Current Medications: 














Generic Name Dose Route Start Last Admin





  Trade Name Freq  PRN Reason Stop Dose Admin


 


Acetaminophen  650 mg  19 22:49 





  Tylenol  PO  





  Q4H PRN  





  Pain MILD(1-3)/Fever >100.5/HA  


 


Aspirin  81 mg  19 10:00  19 10:29





  Baby Aspirin  PO   81 mg





  DAILY MARY   Administration


 


Enoxaparin Sodium  40 mg  19 10:00  19 10:45





  Lovenox  SUB-Q   40 mg





  QDAY MARY   Administration


 


Ceftriaxone Sodium  1 gm in 50 mls @ 100 mls/hr  19 22:49  19 10:37





  Rocephin/Ns 1 Gm/50 Ml  IV   100 mls/hr





  Q24HR MARY   Administration





  Protocol  


 


Sodium Chloride  1,000 mls @ 75 mls/hr  19 23:00  19 06:27





  Nacl 0.9% 1000 Ml  IV   75 mls/hr





  AS DIRECT MARY   Administration


 


Lorazepam  1 mg  19 10:00  19 10:29





  Ativan  PO   1 mg





  DAILY MARY   Administration


 


Methimazole  5 mg  19 08:00  19 21:40





  Tapazole  PO   5 mg





  TID MARY   Administration


 


Miscellaneous Medication  100 mg  19 22:00 





  Quetiapine Fumarate [Seroquel]  PO  





  BID MARY  


 


Ondansetron HCl  4 mg  19 22:49 





  Zofran  IV  





  Q8H PRN  





  Nausea And Vomiting  


 


Risperidone  1 mg  19 10:00  19 10:30





  Risperdal  PO   1 mg





  DAILY MARY   Administration


 


Sertraline HCl  100 mg  19 10:00  19 10:30





  Zoloft  PO   100 mg





  DAILY MARY   Administration


 


Sodium Chloride  10 ml  19 10:00  19 21:40





  Sodium Chloride Flush Syringe 10 Ml  IV   10 ml





  BID MARY   Administration


 


Sodium Chloride  10 ml  19 22:49 





  Sodium Chloride Flush Syringe 10 Ml  IV  





  PRN PRN  





  LINE FLUSH  


 


Trazodone HCl  50 mg  19 22:00 





  Desyrel  PO  





  [facility]  














Nutrition/Malnutrition Assess





- Dietary Evaluation


Nutrition/Malnutrition Findings: 


                                        





Nutrition Notes                                            Start:  19 

16:18


Freq:                                                      Status: Active       

 


Protocol:                                                                       

 


 Document     19 16:18  [facility]  (Rec: 19 16:21  [facility]  [id number]

[id number])


 Nutrition Notes


     Need for Assessment generated from:         RN Referral,MST


     Initial or Follow up                        Assessment


     Current Diagnosis                           Hypertension


     Other Pertinent Diagnosis                   Hypotension, Dementia


     Current Diet                                Cardiac


     Labs/Tests                                  K 3.3


     Pertinent Medications                       Reviewed


     Height                                      5 ft 5 in


     Weight                                      68 kg


     Ideal Body Weight (kg)                      56.81


     BMI                                         24.9


     Weight Status                               Appropriate


     Subjective/Other Information                Pt screened for malnutrition


                                                 and skin risks (Manuel score


                                                 unavailable).  Per RN, pt


                                                 agitated and very confused.


     Burn                                        Absent


     Trauma                                      Absent


     #1


      Nutrition Diagnosis                        Predicted suboptimal energy


                                                 intake


      Etiology                                   dementia, confusion


      As Evidenced by Signs and Symptoms         pt with bilat wrist restraints


     Is patient on ventilator?                   No


     Is Patient Ambulatory and/or Out of Bed     No


     REE-([facility] Jeor-confined to bed)      8816.969


     Calculation Used for Recommendations        [facility]


     Additional Notes                            Pro needs 1-1.2g/k-82g/


                                                 day


                                                 Fluid needs 1ml/kcal


 Nutrition Intervention


     Change Diet Order:                          Continue current diet order


     Goal #1                                     PO intakes to meet at least 75


                                                 % energy and pro needs


     Anticipated Discharge Needs:                Unable to identify at this


                                                 time


     Follow-Up By:                               04/10/19


     Additional Comments                         F/U: intakes

## 2019-04-10 VITALS — DIASTOLIC BLOOD PRESSURE: 74 MMHG | SYSTOLIC BLOOD PRESSURE: 129 MMHG

## 2019-04-10 LAB
BILIRUB UR QL STRIP: (no result)
BLOOD UR QL VISUAL: (no result)
MUCOUS THREADS #/AREA URNS HPF: (no result) /HPF
PH UR STRIP: 7 [PH] (ref 5–7)
PROT UR STRIP-MCNC: (no result) MG/DL
RBC #/AREA URNS HPF: 8 /HPF (ref 0–6)
UROBILINOGEN UR-MCNC: < 2 MG/DL (ref ?–2)
WBC #/AREA URNS HPF: 1 /HPF (ref 0–6)

## 2019-04-10 RX ADMIN — Medication SCH ML: at 11:00

## 2019-04-10 RX ADMIN — SERTRALINE SCH MG: 100 TABLET, FILM COATED ORAL at 10:59

## 2019-04-10 RX ADMIN — METHIMAZOLE SCH: 5 TABLET ORAL at 08:56

## 2019-04-10 RX ADMIN — ENOXAPARIN SODIUM SCH MG: 100 INJECTION SUBCUTANEOUS at 10:58

## 2019-04-10 RX ADMIN — ASPIRIN SCH MG: 81 TABLET, CHEWABLE ORAL at 10:58

## 2019-04-10 RX ADMIN — CEFTRIAXONE SODIUM SCH MLS/HR: 1 INJECTION, POWDER, FOR SOLUTION INTRAMUSCULAR; INTRAVENOUS at 10:55

## 2019-04-10 RX ADMIN — LORAZEPAM SCH MG: 1 TABLET ORAL at 10:59

## 2019-04-10 RX ADMIN — METHIMAZOLE SCH MG: 5 TABLET ORAL at 14:05

## 2019-04-10 RX ADMIN — SODIUM CHLORIDE SCH MLS/HR: 0.9 INJECTION, SOLUTION INTRAVENOUS at 06:35

## 2019-04-10 NOTE — DISCHARGE SUMMARY
Providers





- Providers


Date of Admission: 


04/07/19 22:48





Attending physician: 


ADRIA CAAL MD





                                        





04/08/19 18:03


Physical Therapy Evaluation and Treat [CONS] Routine 


   Comment: 


   Reason For Exam: weakness





04/09/19 11:10


Consult to Dietitian/Nutrition [CONS] Routine 


   Physician Instructions: 


   Reason For Exam: 


   Reason for Consult: Malnutrition











Primary care physician: 


PRIMARY CARE MD








Hospitalization


Condition: Good


Hospital course: 





69F w pmh of htn, dementia, hyperthyroidism, who was found slumpoed over by 

family. She was noted to have low BP. She was on 4 BP meds at home


-  Her daughter had just moved to [facility] 2 weeks prior.  She was in

 a home hospice agency.  And he added several new blood pressure medications, 

and added several new psych medications.  Her daughter was complaining that her 

mother was drowsy and incoherent the whole time on those medications.


-High blood pressure medications were optimized, clonidine was discontinued.,  

Risperdal and trazodone were also discontinued.  Ativan was changed to when 

necessary.  A lot of her symptoms are likely due to polypharmacy,  her 

medications were optimized.  The patient clinically improved and was discharged


-Infectious workup was negative which included chest x-ray blood cultures and 

urinalysis.- thyroid Function tests are within normal limits





Diagnosis


Hypotension due to polypharmacy


Transient Autonomic Dysfunction


Hx of htn


hyperthryoidism


dementia


hypokalemia


hypernatremia/free water deficit/dehydration


acute metabolic encephalopathy








Disposition: DC-50 TO HOSPICE (HOME)


Time spent for discharge: 33 mins





Core Measure Documentation





- Palliative Care


Palliative Care/ Comfort Measures: Hospice Care





- Core Measures


Any of the following diagnoses?: none





Exam





- Physical Exam


Narrative exam: 





General.: Appears well, no distress, nontoxic


HEENT: Moist mucous membranes, extraocular muscles intact, no lymphadenopathy


Neck: supple


Cardiac: S1-S2 heard


Lungs: clear to auscultation bilaterally


Abdomen: soft , nontender,  nondistended,  bowel sounds positive


Extremities: no edema clubbing or cyanosis


Skin: no rash or lesions


Neurologic: no gross focal deficits, demented


Psych: calm, and cooperative





- Constitutional


Vitals: 


                                        











Temp Pulse Resp BP Pulse Ox


 


 98.6 F   76   18   165/92   96 


 


 04/10/19 07:29  04/10/19 07:29  04/10/19 07:29  04/10/19 07:29  04/10/19 09:24














Plan


Follow up with: 


PRIMARY CARE,MD [Primary Care Provider] - 7 Days


Prescriptions: 


LORazepam [Ativan] 1 mg PO Q8H PRN #14 tablet


 PRN Reason: Agitation

## 2019-04-16 ENCOUNTER — HOSPITAL ENCOUNTER (INPATIENT)
Dept: HOSPITAL 5 - ED | Age: 69
LOS: 9 days | Discharge: HOME | DRG: 71 | End: 2019-04-25
Attending: INTERNAL MEDICINE | Admitting: INTERNAL MEDICINE
Payer: MEDICARE

## 2019-04-16 DIAGNOSIS — R65.10: ICD-10-CM

## 2019-04-16 DIAGNOSIS — E46: ICD-10-CM

## 2019-04-16 DIAGNOSIS — Z79.899: ICD-10-CM

## 2019-04-16 DIAGNOSIS — F03.90: ICD-10-CM

## 2019-04-16 DIAGNOSIS — E87.6: ICD-10-CM

## 2019-04-16 DIAGNOSIS — Z86.73: ICD-10-CM

## 2019-04-16 DIAGNOSIS — E87.0: ICD-10-CM

## 2019-04-16 DIAGNOSIS — I95.9: ICD-10-CM

## 2019-04-16 DIAGNOSIS — E05.90: ICD-10-CM

## 2019-04-16 DIAGNOSIS — G93.41: Primary | ICD-10-CM

## 2019-04-16 DIAGNOSIS — E55.9: ICD-10-CM

## 2019-04-16 DIAGNOSIS — Z79.82: ICD-10-CM

## 2019-04-16 DIAGNOSIS — I10: ICD-10-CM

## 2019-04-16 DIAGNOSIS — Z82.49: ICD-10-CM

## 2019-04-16 LAB
BACTERIA #/AREA URNS HPF: (no result) /HPF
BASOPHILS # (AUTO): 0 K/MM3 (ref 0–0.1)
BASOPHILS NFR BLD AUTO: 0.2 % (ref 0–1.8)
BENZODIAZEPINES SCREEN,URINE: (no result)
BILIRUB UR QL STRIP: (no result)
BLOOD UR QL VISUAL: (no result)
BUN SERPL-MCNC: 8 MG/DL (ref 7–17)
BUN/CREAT SERPL: 13 %
CALCIUM SERPL-MCNC: 9.9 MG/DL (ref 8.4–10.2)
EOSINOPHIL # BLD AUTO: 0 K/MM3 (ref 0–0.4)
EOSINOPHIL NFR BLD AUTO: 0.4 % (ref 0–4.3)
HCT VFR BLD CALC: 41.7 % (ref 30.3–42.9)
HEMOLYSIS INDEX: 39
HGB BLD-MCNC: 13.9 GM/DL (ref 10.1–14.3)
LYMPHOCYTES # BLD AUTO: 1.6 K/MM3 (ref 1.2–5.4)
LYMPHOCYTES NFR BLD AUTO: 13.3 % (ref 13.4–35)
MCHC RBC AUTO-ENTMCNC: 33 % (ref 30–34)
MCV RBC AUTO: 90 FL (ref 79–97)
METHADONE SCREEN,URINE: (no result)
MONOCYTES # (AUTO): 0.9 K/MM3 (ref 0–0.8)
MONOCYTES % (AUTO): 7.3 % (ref 0–7.3)
MUCOUS THREADS #/AREA URNS HPF: (no result) /HPF
OPIATE SCREEN,URINE: (no result)
PH UR STRIP: 7 [PH] (ref 5–7)
PLATELET # BLD: 161 K/MM3 (ref 140–440)
PROT UR STRIP-MCNC: (no result) MG/DL
RBC # BLD AUTO: 4.65 M/MM3 (ref 3.65–5.03)
RBC #/AREA URNS HPF: 1 /HPF (ref 0–6)
UROBILINOGEN UR-MCNC: < 2 MG/DL (ref ?–2)
WBC #/AREA URNS HPF: 1 /HPF (ref 0–6)

## 2019-04-16 PROCEDURE — 84480 ASSAY TRIIODOTHYRONINE (T3): CPT

## 2019-04-16 PROCEDURE — 70498 CT ANGIOGRAPHY NECK: CPT

## 2019-04-16 PROCEDURE — 87186 SC STD MICRODIL/AGAR DIL: CPT

## 2019-04-16 PROCEDURE — 71045 X-RAY EXAM CHEST 1 VIEW: CPT

## 2019-04-16 PROCEDURE — 80307 DRUG TEST PRSMV CHEM ANLYZR: CPT

## 2019-04-16 PROCEDURE — 85007 BL SMEAR W/DIFF WBC COUNT: CPT

## 2019-04-16 PROCEDURE — 83735 ASSAY OF MAGNESIUM: CPT

## 2019-04-16 PROCEDURE — 85025 COMPLETE CBC W/AUTO DIFF WBC: CPT

## 2019-04-16 PROCEDURE — 87086 URINE CULTURE/COLONY COUNT: CPT

## 2019-04-16 PROCEDURE — 87076 CULTURE ANAEROBE IDENT EACH: CPT

## 2019-04-16 PROCEDURE — 82306 VITAMIN D 25 HYDROXY: CPT

## 2019-04-16 PROCEDURE — 93010 ELECTROCARDIOGRAM REPORT: CPT

## 2019-04-16 PROCEDURE — 84132 ASSAY OF SERUM POTASSIUM: CPT

## 2019-04-16 PROCEDURE — 70496 CT ANGIOGRAPHY HEAD: CPT

## 2019-04-16 PROCEDURE — 84443 ASSAY THYROID STIM HORMONE: CPT

## 2019-04-16 PROCEDURE — G0480 DRUG TEST DEF 1-7 CLASSES: HCPCS

## 2019-04-16 PROCEDURE — 84425 ASSAY OF VITAMIN B-1: CPT

## 2019-04-16 PROCEDURE — 86800 THYROGLOBULIN ANTIBODY: CPT

## 2019-04-16 PROCEDURE — 36415 COLL VENOUS BLD VENIPUNCTURE: CPT

## 2019-04-16 PROCEDURE — 70450 CT HEAD/BRAIN W/O DYE: CPT

## 2019-04-16 PROCEDURE — 93005 ELECTROCARDIOGRAM TRACING: CPT

## 2019-04-16 PROCEDURE — 80320 DRUG SCREEN QUANTALCOHOLS: CPT

## 2019-04-16 PROCEDURE — 82607 VITAMIN B-12: CPT

## 2019-04-16 PROCEDURE — 84439 ASSAY OF FREE THYROXINE: CPT

## 2019-04-16 PROCEDURE — 80061 LIPID PANEL: CPT

## 2019-04-16 PROCEDURE — 81001 URINALYSIS AUTO W/SCOPE: CPT

## 2019-04-16 PROCEDURE — 80048 BASIC METABOLIC PNL TOTAL CA: CPT

## 2019-04-16 PROCEDURE — 93306 TTE W/DOPPLER COMPLETE: CPT

## 2019-04-16 PROCEDURE — 82747 ASSAY OF FOLIC ACID RBC: CPT

## 2019-04-16 RX ADMIN — ENOXAPARIN SODIUM SCH: 100 INJECTION SUBCUTANEOUS at 22:00

## 2019-04-16 NOTE — CAT SCAN REPORT
PROCEDURE:  CT angiogram head with contrast. 

 

TECHNIQUE:  Computerized tomographic angiography of the head was performed after the IV injection of 
iodinated nonionic contrast including image processing.  The image data was postprocessed using 2-dim
ensional multiplanar reformatted (MPR) and 3-dimensional (MIP and/or volume rendered) techniques.   

 

CT DOSE LENGTH PRODUCT:  Not provided  mGycm 

 

HISTORY: ? left mca syndrome 

 

COMPARISONS:  None. 

 

FINDINGS: 

 

Both distal internal carotid arteries are patent. Both anterior cerebral arteries are patent. The ant
erior communicating artery is patent. Both middle cerebral arteries are patent. Both posterior commun
icating arteries are patent. Both distal vertebral arteries are patent. Both posterior inferior cereb
ellar arteries are patent. The basilar artery is patent. Both anterior inferior cerebellar arteries a
re patent. Both superior cerebellar and both posterior cerebral arteries are patent. There are no sig
ns of aneurysm disease. There is no evidence of a vasculitis. 

 

IMPRESSION:  Normal study. 

 

This document is electronically signed by Genaro Sanford MD., April 16 2019 09:17:16 PM ET

## 2019-04-16 NOTE — HISTORY AND PHYSICAL REPORT
History of Present Illness


Chief complaint: 





Confused


History of present illness: 


70 YO Female Personal Care Home Resident with HTN, Dementia, Hypothyroidism, 

Malnutrition presents to ED for evaluation. Pt is confused and unable to provide

history. Pt history taken from ED staff, as well as Personal Care Home staff. As

per staff, the patient has experienced increased confusion over the past 1 day. 

Pt was examined by [facility] staff and was found to be hypotensive with blood pressure

of 60/30.  EMS notified, and upon arrival the patient was found to be in 

distress, and transported to [facility] ED. Pt seen and evaluated in ED and a code 

stroke was called. Teleneurology consulted. Pt is outside therapeutic window for

TPA at time of my exam. Pt treated with IVF resuscitation therapy with No 

further history obtainable. Pt admitted to telemetry and initiated on CVA 

protocol. Neurology consulted in ED. Prior admission on 19 reviewed. All 

listed medication reconciled at time of admission. 





Past History


Past Surgical History: , Other (Lumbar surgery, shoulder surgery)


Social history: 


Family history: hypertension





Medications and Allergies


                                    Allergies











Allergy/AdvReac Type Severity Reaction Status Date / Time


 


No Known Allergies Allergy   Verified 19 18:27











                                Home Medications











 Medication  Instructions  Recorded  Confirmed  Last Taken  Type


 


Amlodipine Besylate [Norvasc] 10 mg PO DAILY 19 Unknown History


 


Aspirin [Children's Aspirin] 81 mg PO DAILY 19 Unknown History


 


Losartan [Cozaar] 100 mg PO DAILY 19 Unknown History


 


Quetiapine Fumarate [SEROquel] 100 mg PO BID 19 Unknown History


 


Sertraline [Zoloft] 100 mg PO DAILY 19 Unknown History


 


methIMAzole [Tapazole] 5 mg PO TID 19 Unknown History


 


LORazepam [Ativan] 1 mg PO Q8H PRN #14 tablet 04/10/19  Unknown Rx











Active Meds: 


Active Medications





Lorazepam (Ativan)  2 mg IM Q4HR PRN


   PRN Reason: Agitation











Review of Systems


ROS unobtainable: due to mental status





Exam





- Constitutional


Vitals: 


                                        











Temp Pulse Resp BP Pulse Ox


 


 97.3 F L  100 H  16   110/76   100 


 


 19 18:17  19 18:17  19 18:17  19 18:17  19 18:17











General appearance: Present: mild distress





- EENT


Eyes: Present: PERRL


ENT: hearing intact, clear oral mucosa





- Neck


Neck: Present: supple, normal ROM





- Respiratory


Respiratory effort: normal


Respiratory: bilateral: CTA





- Cardiovascular


Heart Sounds: Present: S1 & S2.  Absent: rub, click





- Extremities


Extremities: pulses symmetrical, No edema


Peripheral Pulses: within normal limits





- Abdominal


General gastrointestinal: Present: soft, non-tender, non-distended, normal bowel

 sounds


Female genitourinary: Present: normal





- Integumentary


Integumentary: Present: clear, dry, decreased turgor





- Musculoskeletal


Musculoskeletal: generalized weakness





- Psychiatric


Psychiatric: no appropriate mood/affect, no intact judgment & insight, no memory

 intact





- Neurologic


Neurologic: no CNII-XII intact, moves all extremities, no gait normal





Results





- Labs


CBC & Chem 7: 


                                 19 16:29





                                 19 16:29


Labs: 


                              Abnormal lab results











  19 Range/Units





  16:29 16:29 17:25 


 


WBC  12.1 H    (4.5-11.0)  K/mm3


 


Lymph % (Auto)  13.3 L    (13.4-35.0)  %


 


Mono #  0.9 H    (0.0-0.8)  K/mm3


 


Seg Neutrophils %  78.8 H    (40.0-70.0)  %


 


Seg Neutrophils #  9.5 H    (1.8-7.7)  K/mm3


 


Potassium   3.5 L   (3.6-5.0)  mmol/L


 


Creatinine   0.6 L   (0.7-1.2)  mg/dL


 


Glucose   131 H   ()  mg/dL


 


Free T4     (0.76-1.46)  ng/dL


 


Salicylates     (2.8-20.0)  mg/dL


 


Acetaminophen    < 5.0 L  (10.0-30.0)  ug/mL














  19 Range/Units





  17:33 17:34 


 


WBC    (4.5-11.0)  K/mm3


 


Lymph % (Auto)    (13.4-35.0)  %


 


Mono #    (0.0-0.8)  K/mm3


 


Seg Neutrophils %    (40.0-70.0)  %


 


Seg Neutrophils #    (1.8-7.7)  K/mm3


 


Potassium    (3.6-5.0)  mmol/L


 


Creatinine    (0.7-1.2)  mg/dL


 


Glucose    ()  mg/dL


 


Free T4  1.55 H   (0.76-1.46)  ng/dL


 


Salicylates   < 0.3 L  (2.8-20.0)  mg/dL


 


Acetaminophen    (10.0-30.0)  ug/mL














Assessment and Plan





- Patient Problems


(1) CVA (cerebral vascular accident)


Current Visit: Yes   Status: Acute   


Qualifiers: 


   Precerebral and cerebral artery: middle cerebral artery   Laterality of 

affected vessel: left 


Plan to address problem: 


Admit to telemetry, 








(2) Encephalopathy


Current Visit: Yes   Status: Acute   


Plan to address problem: 


CT head, neuro check, aspiration precautions, fall precautions, seizure 

precautions, 








(3) SIRS (systemic inflammatory response syndrome)


Current Visit: Yes   Status: Acute   


Plan to address problem: 


Empiric IV antibiotic therapy, urinalysis, chest x ray, repeat cbc in AM. 








(4) DVT prophylaxis


Current Visit: Yes   Status: Acute   


Plan to address problem: 


SCD to ble while in bed, prophylactic lovenox

## 2019-04-16 NOTE — CAT SCAN REPORT
PROCEDURE: CT HEAD/BRAIN WO CON 

 

TECHNIQUE:  CT images of the head were obtained without the use of IV contrast 

 

HISTORY: ams 

 

COMPARISONS: 4/7/2019  

 

FINDINGS: 

 

There are age-appropriate involutional changes. There is asymmetric hyperdensity in the left MCA sylv
jimena fissure branch. No parenchymal edema is seen. However cannot fully exclude a thrombus. No CT evid
ence of intracranial mass, hemorrhage, or hydrocephalus. No acute fracture. Visualized paranasal sinu
ses and mastoids are aerated. 

 

IMPRESSION:  

 

Asymmetric hyperdensity of the left middle cerebral artery sylvian fissure branch. Cannot exclude a t
hrombus. No CT evidence of parenchymal edema 

 

This document is electronically signed by Lucero Foley MD., April 16 2019 06:52:05 PM ET

## 2019-04-16 NOTE — XRAY REPORT
PROCEDURE: XR CHEST 1V AP 

 

TECHNIQUE:  Chest radiograph single view.  

 

HISTORY: ams 

 

COMPARISONS: 4/7/2019 . 

 

FINDINGS: 

 

Heart: Normal. 

Mediastinum/Vessels: Aortic atherosclerotic calcification. 

Lungs/Pleural space:  No infiltrate, effusion, or pneumothorax. 

Bony thorax: No acute osseous abnormality. 

Life support devices: None. 

 

IMPRESSION:  No radiographic evidence of acute cardiopulmonary abnormality. 

 

This document is electronically signed by Lucero Foley MD., April 16 2019 06:27:30 PM ET

## 2019-04-16 NOTE — EMERGENCY DEPARTMENT REPORT
ED General Adult HPI





- General


Chief complaint: Altered Mental Status


Stated complaint: HYPOTENSION/ALTERED


Time Seen by Provider: 19 16:59


Source: EMS (ems notes not available at time of  chart dictation), RN notes 

reviewed, old records reviewed


Mode of arrival: Stretcher


Limitations: Altered Mental Status





- History of Present Illness


Initial comments: 





This is a 69-year-old female.





The patient is not known to this provider previously.





The patient has a past medical history of dementia, hypertension, 

hypothyroidism, presumed metabolic encephalopathy, likely secondary to 

polypharmacy.





The patient is sent to the emergency room from a local personal care home for 

reported hypotension and altered mental status.  As per verbal report from [facility] staff, patient hypotensive in the field, uncertain what the blood pressure

was exactly, and this was improved with IV fluids.





In the emergency room, the patient is nonverbal.  She moves 4 extremities.  She 

is not following commands.





There is no collateral information available at this time.





Family, friends not available for additional history.





Therefore, the patient cannot describe exacerbating or relieving factors, 

qualitative nature of her symptoms, radiation of her symptoms.








-: unknown


Radiation: other


Severity scale (0 -10): 0


Quality: other


Consistency: other


Improves with: other


Worsens with: other


Associated Symptoms: other





- Related Data


                                Home Medications











 Medication  Instructions  Recorded  Confirmed  Last Taken


 


Amlodipine Besylate [Norvasc] 10 mg PO DAILY 19 Unknown


 


Aspirin [Children's Aspirin] 81 mg PO DAILY 19 Unknown


 


Losartan [Cozaar] 100 mg PO DAILY 19 Unknown


 


Quetiapine Fumarate [SEROquel] 100 mg PO BID 19 Unknown


 


Sertraline [Zoloft] 100 mg PO DAILY 19 Unknown


 


methIMAzole [Tapazole] 5 mg PO TID 19 Unknown








                                  Previous Rx's











 Medication  Instructions  Recorded  Last Taken  Type


 


LORazepam [Ativan] 1 mg PO Q8H PRN #14 tablet 04/10/19 Unknown Rx











                                    Allergies











Allergy/AdvReac Type Severity Reaction Status Date / Time


 


No Known Allergies Allergy   Verified 19 18:27














ED Review of Systems


ROS: 


Stated complaint: HYPOTENSION/ALTERED


Other details as noted in HPI





Comment: Unobtainable due to pts medical conditions (the patient does not 

answer open-ended or close ended questions)





ED Past Medical Hx





- Past Medical History


Previous Medical History?: Yes


Hx Hypertension: Yes


Hx CVA: Yes (TIA)


Hx Psychiatric Treatment: Yes


Hx Dementia: Yes





- Surgical History


Past Surgical History?: Yes


Additional Surgical History: lumbar sx .   .  shoulder sx





- Social History


Smoking Status: Unknown if ever smoked





- Medications


Home Medications: 


                                Home Medications











 Medication  Instructions  Recorded  Confirmed  Last Taken  Type


 


Amlodipine Besylate [Norvasc] 10 mg PO DAILY 19 Unknown History


 


Aspirin [Children's Aspirin] 81 mg PO DAILY 19 Unknown History


 


Losartan [Cozaar] 100 mg PO DAILY 19 Unknown History


 


Quetiapine Fumarate [SEROquel] 100 mg PO BID 19 Unknown History


 


Sertraline [Zoloft] 100 mg PO DAILY 19 Unknown History


 


methIMAzole [Tapazole] 5 mg PO TID 19 Unknown History


 


LORazepam [Ativan] 1 mg PO Q8H PRN #14 tablet 04/10/19  Unknown Rx














ED Physical Exam





- General


Limitations: Altered Mental Status


General appearance: in no apparent distress





- Head


Head exam: Present: atraumatic, normocephalic





- Eye


Eye exam: Present: normal appearance, PERRL





- ENT


ENT exam: Present: normal exam, normal orophraynx, mucous membranes moist, 

normal external ear exam





- Neck


Neck exam: Present: normal inspection, full ROM.  Absent: tenderness, 

meningismus





- Respiratory


Respiratory exam: Present: normal lung sounds bilaterally.  Absent: respiratory 

distress, wheezes, rales, rhonchi, stridor





- Cardiovascular


Cardiovascular Exam: Present: regular rate, normal rhythm, normal heart sounds. 

 Absent: bradycardia, tachycardia, irregular rhythm, systolic murmur, diastolic 

murmur, rubs, gallop





- GI/Abdominal


GI/Abdominal exam: Present: soft.  Absent: distended, tenderness, guarding, 

rigid, pulsatile mass





- Rectal


Rectal exam: Present: normal inspection, other (chaperoned by nurse ELLIS Martínez)





- 


External exam: Present: normal external exam, other (chaperoned by nurse ELLIS Martínez)


Speculum exam: Present: other





- Extremities Exam


Extremities exam: Present: normal inspection, full ROM, other (2+ pulses noted 

in the bilateral upper, lower extremities.  Compartments soft.  No long bony 

tenderness.  The pelvis is stable.).  Absent: pedal edema, calf tenderness





- Back Exam


Back exam: Present: normal inspection, full ROM.  Absent: tenderness, CVA 

tenderness (R), paraspinal tenderness, vertebral tenderness





- Neurological Exam


Neurological exam: Present: altered, other (moving 4 extremities spontaneously. 

 There is no facial droop.  Detailed neurologic examination not possible 

secondary to patient's altered mental status and inability to cooperate.)





- Psychiatric


Psychiatric exam: Present: other (the patient is nonverbal)





- Skin


Skin exam: Present: warm, dry, intact, normal color.  Absent: rash





ED Course


                                   Vital Signs











  19





  16:02 18:17 20:53


 


Temperature 97.2 F L 97.3 F L 98.4 F


 


Pulse Rate 77 100 H 93 H


 


Respiratory 14 16 18





Rate   


 


Blood Pressure 105/59  


 


Blood Pressure  110/76 156/85





[Right]   


 


O2 Sat by Pulse 98 100 96





Oximetry   














- Reevaluation(s)


Reevaluation #1: 





19 18:20


Differential diagnosis, including not limited to: Dehydration, autonomic 

dysfunction, polypharmacy, intracranial lesion, dementia, thyroid derangement, 

pneumonia, urinary tract infection, stroke





Assessment and plan: 69-year-old female with known baseline altered mental 

status and poor baseline cognitive function, who is nonverbal, sent for 

nonspecific alteration in mental status.  The patient is not a TPA candidate as 

her last known well time is not known.





Given her profound advanced dementia, and the fact that her last known well time

 is not known, the patient would not be considered an candidate for endovascular

 intervention, for large vessel occlusion, therefore, she does not require an 

emergency CT angiogram of the head and neck.  Furthermore, the patient is moving

 her arms and legs quite vigorously, with no obvious facial droop, and is 

protecting her airway, making a large vessel occlusion quite unlikely.





Most likely, the patient is experiencing natural history of dementia, possible 

malnutrition, dehydration, and possible side effects of medications, 

polypharmacy.





It appears that she was recently discharged on Zoloft, quetiapine, and as needed

 Ativan.





These medications may be contributing to her alterations in mental status.





Hypotension is now resolved.








Reevaluation #2: 





19 20:33


CT scan of the brain suggested a possible left-sided MCA thrombosis.  Patient 

was evaluated by stroke neurology, Dr. Arvizu, who agreed that patient would 

be very unlikely to be a candidate for endovascular intervention, and agreed 

that it is very unlikely that the patient has a large vessel occlusion, but 

agreed with emergent acquisition of angiogram of the head and neck to exclude 

large vessel occlusion.





Dr. Holder, the hospital physician, has accepted the patient to the medical 

service


Reevaluation #3: 





19 21:25


CT scan of the brain, CT scan of the neck, angiograms, did not demonstrate any 

large vessel occlusion, or any hemodynamically significant stenoses.





We will defer to the inpatient team for further management





ED Medical Decision Making





- Lab Data


Result diagrams: 


                                 19 16:29





                                 19 16:29








                                   Vital Signs











  19





  16:02 18:17


 


Temperature 97.2 F L 97.3 F L


 


Pulse Rate 77 100 H


 


Respiratory 14 16





Rate  


 


Blood Pressure 105/59 


 


Blood Pressure  110/76





[Right]  


 


O2 Sat by Pulse 98 100





Oximetry  











                                   Lab Results











  19 Range/Units





  16:29 16:29 17:20 


 


WBC  12.1 H    (4.5-11.0)  K/mm3


 


RBC  4.65    (3.65-5.03)  M/mm3


 


Hgb  13.9    (10.1-14.3)  gm/dl


 


Hct  41.7    (30.3-42.9)  %


 


MCV  90    (79-97)  fl


 


MCH  30    (28-32)  pg


 


MCHC  33    (30-34)  %


 


RDW  13.5    (13.2-15.2)  %


 


Plt Count  161    (140-440)  K/mm3


 


Lymph % (Auto)  13.3 L    (13.4-35.0)  %


 


Mono % (Auto)  7.3    (0.0-7.3)  %


 


Eos % (Auto)  0.4    (0.0-4.3)  %


 


Baso % (Auto)  0.2    (0.0-1.8)  %


 


Lymph #  1.6    (1.2-5.4)  K/mm3


 


Mono #  0.9 H    (0.0-0.8)  K/mm3


 


Eos #  0.0    (0.0-0.4)  K/mm3


 


Baso #  0.0    (0.0-0.1)  K/mm3


 


Seg Neutrophils %  78.8 H    (40.0-70.0)  %


 


Seg Neutrophils #  9.5 H    (1.8-7.7)  K/mm3


 


Sodium   142   (137-145)  mmol/L


 


Potassium   3.5 L   (3.6-5.0)  mmol/L


 


Chloride   100.2   ()  mmol/L


 


Carbon Dioxide   23   (22-30)  mmol/L


 


Anion Gap   22   mmol/L


 


BUN   8   (7-17)  mg/dL


 


Creatinine   0.6 L   (0.7-1.2)  mg/dL


 


Estimated GFR   > 60   ml/min


 


BUN/Creatinine Ratio   13   %


 


Glucose   131 H   ()  mg/dL


 


Calcium   9.9   (8.4-10.2)  mg/dL


 


TSH     (0.270-4.200)  mlU/mL


 


Free T4     (0.76-1.46)  ng/dL


 


Urine Color    Yellow  (Yellow)  


 


Urine Turbidity    Clear  (Clear)  


 


Urine pH    7.0  (5.0-7.0)  


 


Ur Specific Gravity    1.006  (1.003-1.030)  


 


Urine Protein    <15 mg/dl  (Negative)  mg/dL


 


Urine Glucose (UA)    Neg  (Negative)  mg/dL


 


Urine Ketones    Neg  (Negative)  mg/dL


 


Urine Blood    Neg  (Negative)  


 


Urine Nitrite    Neg  (Negative)  


 


Urine Bilirubin    Neg  (Negative)  


 


Urine Urobilinogen    < 2.0  (<2.0)  mg/dL


 


Ur Leukocyte Esterase    Neg  (Negative)  


 


Urine WBC (Auto)    1.0  (0.0-6.0)  /HPF


 


Urine RBC (Auto)    1.0  (0.0-6.0)  /HPF


 


Urine Bacteria (Auto)    1+  (Negative)  /HPF


 


Urine Mucus    Few  /HPF


 


Salicylates     (2.8-20.0)  mg/dL


 


Urine Opiates Screen     


 


Urine Methadone Screen     


 


Acetaminophen     (10.0-30.0)  ug/mL


 


Ur Barbiturates Screen     


 


Ur Phencyclidine Scrn     


 


Ur Amphetamines Screen     


 


U Benzodiazepines Scrn     


 


Urine Cocaine Screen     


 


U Marijuana (THC) Screen     


 


Drugs of Abuse Note     


 


Plasma/Serum Alcohol     (0-0.07)  %














  19 Range/Units





  17:20 17:25 17:25 


 


WBC     (4.5-11.0)  K/mm3


 


RBC     (3.65-5.03)  M/mm3


 


Hgb     (10.1-14.3)  gm/dl


 


Hct     (30.3-42.9)  %


 


MCV     (79-97)  fl


 


MCH     (28-32)  pg


 


MCHC     (30-34)  %


 


RDW     (13.2-15.2)  %


 


Plt Count     (140-440)  K/mm3


 


Lymph % (Auto)     (13.4-35.0)  %


 


Mono % (Auto)     (0.0-7.3)  %


 


Eos % (Auto)     (0.0-4.3)  %


 


Baso % (Auto)     (0.0-1.8)  %


 


Lymph #     (1.2-5.4)  K/mm3


 


Mono #     (0.0-0.8)  K/mm3


 


Eos #     (0.0-0.4)  K/mm3


 


Baso #     (0.0-0.1)  K/mm3


 


Seg Neutrophils %     (40.0-70.0)  %


 


Seg Neutrophils #     (1.8-7.7)  K/mm3


 


Sodium     (137-145)  mmol/L


 


Potassium     (3.6-5.0)  mmol/L


 


Chloride     ()  mmol/L


 


Carbon Dioxide     (22-30)  mmol/L


 


Anion Gap     mmol/L


 


BUN     (7-17)  mg/dL


 


Creatinine     (0.7-1.2)  mg/dL


 


Estimated GFR     ml/min


 


BUN/Creatinine Ratio     %


 


Glucose     ()  mg/dL


 


Calcium     (8.4-10.2)  mg/dL


 


TSH     (0.270-4.200)  mlU/mL


 


Free T4     (0.76-1.46)  ng/dL


 


Urine Color     (Yellow)  


 


Urine Turbidity     (Clear)  


 


Urine pH     (5.0-7.0)  


 


Ur Specific Gravity     (1.003-1.030)  


 


Urine Protein     (Negative)  mg/dL


 


Urine Glucose (UA)     (Negative)  mg/dL


 


Urine Ketones     (Negative)  mg/dL


 


Urine Blood     (Negative)  


 


Urine Nitrite     (Negative)  


 


Urine Bilirubin     (Negative)  


 


Urine Urobilinogen     (<2.0)  mg/dL


 


Ur Leukocyte Esterase     (Negative)  


 


Urine WBC (Auto)     (0.0-6.0)  /HPF


 


Urine RBC (Auto)     (0.0-6.0)  /HPF


 


Urine Bacteria (Auto)     (Negative)  /HPF


 


Urine Mucus     /HPF


 


Salicylates     (2.8-20.0)  mg/dL


 


Urine Opiates Screen  Presumptive negative    


 


Urine Methadone Screen  Presumptive negative    


 


Acetaminophen   < 5.0 L   (10.0-30.0)  ug/mL


 


Ur Barbiturates Screen  Presumptive negative    


 


Ur Phencyclidine Scrn  Presumptive negative    


 


Ur Amphetamines Screen  Presumptive negative    


 


U Benzodiazepines Scrn  Presumptive negative    


 


Urine Cocaine Screen  Presumptive negative    


 


U Marijuana (THC) Screen  Presumptive negative    


 


Drugs of Abuse Note  Disclamer    


 


Plasma/Serum Alcohol    < 0.01  (0-0.07)  %














  19 Range/Units





  17:33 17:34 17:34 


 


WBC     (4.5-11.0)  K/mm3


 


RBC     (3.65-5.03)  M/mm3


 


Hgb     (10.1-14.3)  gm/dl


 


Hct     (30.3-42.9)  %


 


MCV     (79-97)  fl


 


MCH     (28-32)  pg


 


MCHC     (30-34)  %


 


RDW     (13.2-15.2)  %


 


Plt Count     (140-440)  K/mm3


 


Lymph % (Auto)     (13.4-35.0)  %


 


Mono % (Auto)     (0.0-7.3)  %


 


Eos % (Auto)     (0.0-4.3)  %


 


Baso % (Auto)     (0.0-1.8)  %


 


Lymph #     (1.2-5.4)  K/mm3


 


Mono #     (0.0-0.8)  K/mm3


 


Eos #     (0.0-0.4)  K/mm3


 


Baso #     (0.0-0.1)  K/mm3


 


Seg Neutrophils %     (40.0-70.0)  %


 


Seg Neutrophils #     (1.8-7.7)  K/mm3


 


Sodium     (137-145)  mmol/L


 


Potassium     (3.6-5.0)  mmol/L


 


Chloride     ()  mmol/L


 


Carbon Dioxide     (22-30)  mmol/L


 


Anion Gap     mmol/L


 


BUN     (7-17)  mg/dL


 


Creatinine     (0.7-1.2)  mg/dL


 


Estimated GFR     ml/min


 


BUN/Creatinine Ratio     %


 


Glucose     ()  mg/dL


 


Calcium     (8.4-10.2)  mg/dL


 


TSH   1.900   (0.270-4.200)  mlU/mL


 


Free T4  1.55 H    (0.76-1.46)  ng/dL


 


Urine Color     (Yellow)  


 


Urine Turbidity     (Clear)  


 


Urine pH     (5.0-7.0)  


 


Ur Specific Gravity     (1.003-1.030)  


 


Urine Protein     (Negative)  mg/dL


 


Urine Glucose (UA)     (Negative)  mg/dL


 


Urine Ketones     (Negative)  mg/dL


 


Urine Blood     (Negative)  


 


Urine Nitrite     (Negative)  


 


Urine Bilirubin     (Negative)  


 


Urine Urobilinogen     (<2.0)  mg/dL


 


Ur Leukocyte Esterase     (Negative)  


 


Urine WBC (Auto)     (0.0-6.0)  /HPF


 


Urine RBC (Auto)     (0.0-6.0)  /HPF


 


Urine Bacteria (Auto)     (Negative)  /HPF


 


Urine Mucus     /HPF


 


Salicylates    < 0.3 L  (2.8-20.0)  mg/dL


 


Urine Opiates Screen     


 


Urine Methadone Screen     


 


Acetaminophen     (10.0-30.0)  ug/mL


 


Ur Barbiturates Screen     


 


Ur Phencyclidine Scrn     


 


Ur Amphetamines Screen     


 


U Benzodiazepines Scrn     


 


Urine Cocaine Screen     


 


U Marijuana (THC) Screen     


 


Drugs of Abuse Note     


 


Plasma/Serum Alcohol     (0-0.07)  %














- EKG Data


-: EKG Interpreted by Me


EKG shows normal: sinus rhythm


Rate: normal





- EKG Data





19 18:20


EKG shows a sinus rhythm, left axis deviation, QTC prolonged, low voltage, Q 

waves noted in the inferior leads, this is an abnormal EKG, this is not 

consistent with ST elevation myocardial infarction, appears unchanged from prior

 EKG from 2019.





- Radiology Data


Radiology results: pending, report reviewed, image reviewed


interpreted by me: 





X-ray of the chest is negative for acute disease.





Print Report


Referring Physician:   ALCIRA HOLDER


Patient Name:   AVANI GRECO


Patient ID:   [id number]


YOB: 1950


Sex:   Female


Accession:   [id number]


Report Date:   2019


Report Status:   Finalized


Findings


[facility] 


[address] [place] [address] 





Cat Scan Report 


Signed with Kevin 





Patient: AVANI GRECO MR# 


: [id number] 


: 1950 Acct:[id number] 





Age/Sex: 69 / F ADM Date: 19 





Loc: ED 


Attending Dr: 








Ordering Physician: ARISTEO WRIGHT MD 


Date of Service: 19 


Procedure(s): CT head/brain wo con 


Accession Number(s): [id number] 





cc: ARISTEO WRIGHT MD 











**ADDENDUM** 


ADDENDUM: 





Findings were discussed with RN Erlin Fagan by telephone at 5:54 PM central 

standard time on 


2019. 





This document is electronically signed by Lucero Foley MD., 2019 

06:59:42 PM ET 





Addendum Transcribed By: [facility] 


Addendum Dictated By: LUCERO FOLEY M.D. 


Addendum Electronically Authenticated By: LUCERO FOLEY M.D. 


Addendum Signed Date/Time: 19 








DD/DT:  


TD/TT:  


PROCEDURE: CT HEAD/BRAIN WO CON 





TECHNIQUE: CT images of the head were obtained without the use of IV contrast 





HISTORY: ams 





COMPARISONS: 2019 





FINDINGS: 





There are age-appropriate involutional changes. There is asymmetric hyperdensity

 in the left MCA 


sylvian fissure branch. No parenchymal edema is seen. However cannot fully exc

lude a thrombus. No CT


evidence of intracranial mass, hemorrhage, or hydrocephalus. No acute fracture. 

Visualized paranasal


sinuses and mastoids are aerated. 





IMPRESSION: 





Asymmetric hyperdensity of the left middle cerebral artery sylvian fissure 

branch. Cannot exclude a


thrombus. No CT evidence of parenchymal edema 





This document is electronically signed by Lucero Foley MD., 2019 06:

52:05 PM ET 





Transcribed By: [facility] 


Dictated By: LUCERO FOLEY M.D. 


Electronically Authenticated By: LUCERO FOLEY M.D. 


Signed Date/Time: 19 1853 


Critical care attestation.: 


If time is entered above; I have spent that time in minutes in the direct care 

of this critically ill patient, excluding procedure time.








ED Disposition


Clinical Impression: 


 Hypotension, Altered mental state, Medication reaction





Disposition: DC-09 OP ADMIT IP TO THIS HOSP


Is pt being admited?: Yes


Condition: Fair

## 2019-04-16 NOTE — CONSULTATION
History of Present Illness


History of present illness: 











[facility]





Impression: ams, unclear of etiology. r/o stroke r/o tme- medication v 

infection. Exam is non focal at this time.


   


Not a tpa candidate due to: unclear last normal- out of therapeutic window


? dense mca sign per radiology on ct head without contrast: cta head will be 

done to r/o





Comments: 


[facility] contacted: 19:25


[facility] at bedside: 19:29





Recommendations: 


if cta head and neck do not show any acute findings, admit for further workup


telemetry floor


Neuro checks


Bedside swallow eval


Dvt prophylaxis


IV fluids, normal saline


ASA if no contraindications


Head of bed below 30 degrees


Euglycemia and avoid hyperthermia (prn acetaminophen)





inpatient neurology consultation


Inpatient stroke evaluation as per Neurology/ Internal Medicine


Discussed with ED MD





-------------------

----------------------------------------------------------------------





CC confusion





History of Present Illness 





Patient is a 69 year old woman who was brought to the hospital for ams. Unclear

when her last normal was. She has severe dementia, per report from [facility]. 

Hyperdensity seen on ct head without contrast alone fissure of left mca 

division- stroke alert called to r/o stroke as cause for her ams.





Diagnostic:


Ct head without contrast: ? dense mca sign v calcification





nihss 18: 


1A: Level of Consciousness - Requires repeated stimulation to arouse +2


1B: Ask Month and Age - 0 Questions Right +2


1C: 'Blink Eyes' & 'Squeeze Hands' - Performs 0 Tasks +2


2: Test Horizontal Extraocular Movements - Normal 0


3: Test Visual Fields - No Visual Loss 0


4: Test Facial Palsy - Normal symmetry 0


5A: Test Left Arm Motor Drift - Some Effort Against Gravity +2


5B: Test Right Arm Motor Drift - Some Effort Against Gravity +2


6A: Test Left Leg Motor Drift - Some Effort Against Gravity +2


6B: Test Right Leg Motor Drift - Some Effort Against Gravity +2


7: Test Limb Ataxia - No Ataxia 0


8: Test Sensation - Normal; No sensory loss 0


9: Test Language/Aphasia- Severe Aphasia: Fragmentary Expression, Inference 

Needed, Cannot Identify Materials +2


10: Test Dysarthria - Mute/Anarthric +2


11: Test Extinction/Inattention - No abnormality 0


 





Medical Decision Making: 


- Extensive number of diagnosis or management options are considered above. 


- Extensive amount of complex data reviewed. 


- High risk of complication and/or morbidity or mortality are associated with 

differential diagnostic considerations above. 


- There may be Uncertain outcome and increased probability of prolonged 

functional impairment or high probability of severe prolonged functional 

impairment associated with some of these differential diagnosis. 


Medical Data Reviewed: 


1.Data reviewed include clinical labs, radiology, Medical Tests; 


2.Tests results discussed w/performing or interpreting physician; 


3.Obtaining/reviewing old medical records; 


4.Obtaining case history from another source; 


5.Independent review of image, tracing or specimen. 








Patient was informed the Neurology Consult would happen via telehealth (remote v

ideo) and consented to receiving care in this manner.    








Medications and Allergies


                                    Allergies











Allergy/AdvReac Type Severity Reaction Status Date / Time


 


No Known Allergies Allergy   Verified 04/07/19 18:27











                                Home Medications











 Medication  Instructions  Recorded  Confirmed  Last Taken  Type


 


Amlodipine Besylate [Norvasc] 10 mg PO DAILY 04/07/19 04/07/19 Unknown History


 


Aspirin [Children's Aspirin] 81 mg PO DAILY 04/07/19 04/07/19 Unknown History


 


Losartan [Cozaar] 100 mg PO DAILY 04/07/19 04/07/19 Unknown History


 


Quetiapine Fumarate [SEROquel] 100 mg PO BID 04/07/19 04/07/19 Unknown History


 


Sertraline [Zoloft] 100 mg PO DAILY 04/07/19 04/07/19 Unknown History


 


methIMAzole [Tapazole] 5 mg PO TID 04/07/19 04/07/19 Unknown History


 


LORazepam [Ativan] 1 mg PO Q8H PRN #14 tablet 04/10/19  Unknown Rx











Active Meds: 


Active Medications





Acetaminophen (Tylenol)  650 mg PO Q4H PRN


   PRN Reason: Pain MILD(1-3)/Fever >100.5/HA


Albuterol (Proventil)  2.5 mg IH Q4HRT PRN


   PRN Reason: Shortness Of Breath


Amlodipine Besylate (Norvasc)  10 mg PO DAILY [facility]


Aspirin (Baby Aspirin)  81 mg PO DAILY [facility]


Ceftriaxone Sodium (Rocephin/Ns 1 Gm/50 Ml)  1 gm in 50 mls @ 100 mls/hr IV 

Q24HR MARY; Protocol


   Stop: 04/18/19 23:59


Lorazepam (Ativan)  2 mg IM Q4HR PRN


   PRN Reason: Agitation


Lorazepam (Ativan)  1 mg PO Q8H PRN


   PRN Reason: Agitation


Losartan Potassium (Cozaar)  100 mg PO QDAY MARY


Methimazole (Tapazole)  5 mg PO TID MARY


Ondansetron HCl (Zofran)  4 mg IV Q8H PRN


   PRN Reason: Nausea And Vomiting


Quetiapine Fumarate (Seroquel)  100 mg PO BID MARY


Sertraline HCl (Zoloft)  100 mg PO DAILY MARY


Sodium Chloride (Sodium Chloride Flush Syringe 10 Ml)  10 ml IV BID [facility]


Sodium Chloride (Sodium Chloride Flush Syringe 10 Ml)  10 ml IV PRN PRN


   PRN Reason: LINE FLUSH











Physical Examination





- Vital Signs


Vital Signs: 


                                   Vital Signs











Temp Pulse Resp BP Pulse Ox


 


 97.2 F L  77   14   105/59   98 


 


 04/16/19 16:02  04/16/19 16:02  04/16/19 16:02  04/16/19 16:02  04/16/19 16:02














Results





- Laboratory Findings


CBC and BMP: 


                                 04/16/19 16:29





                                 04/16/19 16:29


Abnormal Lab Findings: 


                                  Abnormal Labs











  04/16/19 04/16/19 04/16/19





  16:29 16:29 17:25


 


WBC  12.1 H  


 


Lymph % (Auto)  13.3 L  


 


Mono #  0.9 H  


 


Seg Neutrophils %  78.8 H  


 


Seg Neutrophils #  9.5 H  


 


Potassium   3.5 L 


 


Creatinine   0.6 L 


 


Glucose   131 H 


 


Free T4   


 


Salicylates   


 


Acetaminophen    < 5.0 L














  04/16/19 04/16/19





  17:33 17:34


 


WBC  


 


Lymph % (Auto)  


 


Mono #  


 


Seg Neutrophils %  


 


Seg Neutrophils #  


 


Potassium  


 


Creatinine  


 


Glucose  


 


Free T4  1.55 H 


 


Salicylates   < 0.3 L


 


Acetaminophen

## 2019-04-16 NOTE — CAT SCAN REPORT
PROCEDURE:  CT angiogram neck with contrast. 

 

TECHNIQUE:  Computerized tomographic angiography of the neck was performed after the IV injection of 
iodinated nonionic contrast including image processing. The image data was postprocessed using 2-dime
nsional multiplanar reformatted (MPR) and 3-dimensional (MIP and/or volume rendered) techniques.  

 

CT DOSE LENGTH PRODUCT:  2331.15  mGycm 

 

HISTORY: ? left mca syndrome 

 

COMPARISONS:  None. 

 

Note: Assessment of carotid artery stenosis is based on  measurement of the distal internal carotid a
rtery diameter as the denominator for stenosis calculations and the [place] [employer] Symptomatic Caroti
d Endarterectomy Trial (NASCET) stenosis criteria. 

 

FINDINGS: 

 

The technologist has included images of head. There is a separate requisition for that study. I will 
limit my review to the images of the neck. 

 

The origins of the innominate artery, left common carotid artery and left subclavian artery are widel
y patent. The left vertebral artery has a direct origin from the aortic arch. Both common carotid art
eries are widely patent. There is a small amount of calcified plaque at the right carotid artery bifu
rcation. This causes minimal narrowing at the origin of the right internal carotid artery estimated a
t 15%. The remaining cervical portion of the right internal carotid artery is widely patent. The exte
rnal carotid artery is widely patent. There is some atherosclerotic plaque at the origin of the left 
internal carotid artery. There is minimal narrowing at the origin of the left internal carotid artery
 estimated at 10%. The remainder of the left internal carotid artery is widely patent. There is moder
ate narrowing at the origin of the left external carotid artery. Both vertebral arteries are patent. 
The soft tissues of the neck are unremarkable. There are mucous retention cysts in both maxillary sin
uses. The bones appear intact. 

 

IMPRESSION:  15% narrowing of the right internal carotid artery and 10% narrowing of the left interna
l carotid artery.  

 

This document is electronically signed by Genaro Sanford MD., April 16 2019 08:59:34 PM ET

## 2019-04-17 RX ADMIN — ENOXAPARIN SODIUM SCH MG: 100 INJECTION SUBCUTANEOUS at 21:20

## 2019-04-17 RX ADMIN — METHIMAZOLE SCH MG: 5 TABLET ORAL at 17:43

## 2019-04-17 RX ADMIN — METHIMAZOLE SCH MG: 5 TABLET ORAL at 21:20

## 2019-04-17 RX ADMIN — Medication SCH ML: at 00:01

## 2019-04-17 RX ADMIN — Medication SCH ML: at 21:21

## 2019-04-17 RX ADMIN — ASPIRIN SCH MG: 325 TABLET ORAL at 13:38

## 2019-04-17 RX ADMIN — Medication SCH ML: at 13:39

## 2019-04-17 RX ADMIN — METHIMAZOLE SCH MG: 5 TABLET ORAL at 13:38

## 2019-04-17 RX ADMIN — METHIMAZOLE SCH: 5 TABLET ORAL at 00:01

## 2019-04-17 RX ADMIN — SERTRALINE SCH MG: 100 TABLET, FILM COATED ORAL at 13:38

## 2019-04-17 NOTE — PROGRESS NOTE
Assessment and Plan


Assessment and plan: 





Acute on chronic metabolic encephalopathy 


-Probably secondary to worsening severe dementia


-MRI brain to rule out acute stroke pending


-CT head, CTA head and neck negative


-Neurology consulted





Hypotension, probably med induced


-BP improved, will monitor


-Home antihypertensives on hold





Hyperthyroidism 


-on methimazole


-for out-pt endocrinology f/u





Hypokalemia


-We'll replete and monitor level


-We'll check magnesium level





SIRS


-Probably secondary to noninfectious cause


-Antibiotic discontinued, no evidence of acute infection





History of severe dementia


-Continue supportive care








Disposition: Patient will need nursing home placement, CM following.























History


Interval history: 





Patient is unable to communicate appropriately due to severe dementia.  No 

issues reported.





Hospitalist Physical





- Constitutional


Vitals: 


                                        











Temp Pulse Resp BP Pulse Ox


 


 98.3 F   74   18   101/71   98 


 


 04/17/19 08:43  04/17/19 05:00  04/17/19 08:43  04/17/19 08:43  04/17/19 05:00











General appearance: Present: no acute distress





- EENT


Eyes: Present: PERRL, EOM intact


ENT: hearing intact, clear oral mucosa





- Neck


Neck: Present: supple





- Respiratory


Respiratory effort: normal


Respiratory: bilateral: CTA





- Cardiovascular


Rhythm: regular


Heart Sounds: Present: S1 & S2





- Extremities


Extremities: No edema





- Abdominal


General gastrointestinal: soft, non-tender, non-distended, normal bowel sounds





- Integumentary


Integumentary: Present: clear, warm, dry





- Neurologic


Neurologic: moves all extremities





Results





- Labs


CBC & Chem 7: 


                                 04/16/19 16:29





                                 04/16/19 16:29


Labs: 


                             Laboratory Last Values











WBC  12.1 K/mm3 (4.5-11.0)  H  04/16/19  16:29    


 


RBC  4.65 M/mm3 (3.65-5.03)   04/16/19  16:29    


 


Hgb  13.9 gm/dl (10.1-14.3)   04/16/19  16:29    


 


Hct  41.7 % (30.3-42.9)   04/16/19  16:29    


 


MCV  90 fl (79-97)   04/16/19  16:29    


 


MCH  30 pg (28-32)   04/16/19  16:29    


 


MCHC  33 % (30-34)   04/16/19  16:29    


 


RDW  13.5 % (13.2-15.2)   04/16/19  16:29    


 


Plt Count  161 K/mm3 (140-440)   04/16/19  16:29    


 


Lymph % (Auto)  13.3 % (13.4-35.0)  L  04/16/19  16:29    


 


Mono % (Auto)  7.3 % (0.0-7.3)   04/16/19  16:29    


 


Eos % (Auto)  0.4 % (0.0-4.3)   04/16/19  16:29    


 


Baso % (Auto)  0.2 % (0.0-1.8)   04/16/19  16:29    


 


Lymph #  1.6 K/mm3 (1.2-5.4)   04/16/19  16:29    


 


Mono #  0.9 K/mm3 (0.0-0.8)  H  04/16/19  16:29    


 


Eos #  0.0 K/mm3 (0.0-0.4)   04/16/19  16:29    


 


Baso #  0.0 K/mm3 (0.0-0.1)   04/16/19  16:29    


 


Seg Neutrophils %  78.8 % (40.0-70.0)  H  04/16/19  16:29    


 


Seg Neutrophils #  9.5 K/mm3 (1.8-7.7)  H  04/16/19  16:29    


 


Sodium  142 mmol/L (137-145)   04/16/19  16:29    


 


Potassium  3.5 mmol/L (3.6-5.0)  L  04/16/19  16:29    


 


Chloride  100.2 mmol/L ()   04/16/19  16:29    


 


Carbon Dioxide  23 mmol/L (22-30)   04/16/19  16:29    


 


Anion Gap  22 mmol/L  04/16/19  16:29    


 


BUN  8 mg/dL (7-17)   04/16/19  16:29    


 


Creatinine  0.6 mg/dL (0.7-1.2)  L  04/16/19  16:29    


 


Estimated GFR  > 60 ml/min  04/16/19  16:29    


 


BUN/Creatinine Ratio  13 %  04/16/19  16:29    


 


Glucose  131 mg/dL ()  H  04/16/19  16:29    


 


Calcium  9.9 mg/dL (8.4-10.2)   04/16/19  16:29    


 


TSH  1.900 mlU/mL (0.270-4.200)   04/16/19  17:34    


 


Free T4  1.55 ng/dL (0.76-1.46)  H  04/16/19  17:33    


 


Urine Color  Yellow  (Yellow)   04/16/19  17:20    


 


Urine Turbidity  Clear  (Clear)   04/16/19  17:20    


 


Urine pH  7.0  (5.0-7.0)   04/16/19  17:20    


 


Ur Specific Gravity  1.006  (1.003-1.030)   04/16/19  17:20    


 


Urine Protein  <15 mg/dl mg/dL (Negative)   04/16/19  17:20    


 


Urine Glucose (UA)  Neg mg/dL (Negative)   04/16/19  17:20    


 


Urine Ketones  Neg mg/dL (Negative)   04/16/19  17:20    


 


Urine Blood  Neg  (Negative)   04/16/19  17:20    


 


Urine Nitrite  Neg  (Negative)   04/16/19  17:20    


 


Urine Bilirubin  Neg  (Negative)   04/16/19  17:20    


 


Urine Urobilinogen  < 2.0 mg/dL (<2.0)   04/16/19  17:20    


 


Ur Leukocyte Esterase  Neg  (Negative)   04/16/19  17:20    


 


Urine WBC (Auto)  1.0 /HPF (0.0-6.0)   04/16/19  17:20    


 


Urine RBC (Auto)  1.0 /HPF (0.0-6.0)   04/16/19  17:20    


 


Urine Bacteria (Auto)  1+ /HPF (Negative)   04/16/19  17:20    


 


Urine Mucus  Few /HPF  04/16/19  17:20    


 


Salicylates  < 0.3 mg/dL (2.8-20.0)  L  04/16/19  17:34    


 


Urine Opiates Screen  Presumptive negative   04/16/19  17:20    


 


Urine Methadone Screen  Presumptive negative   04/16/19  17:20    


 


Acetaminophen  < 5.0 ug/mL (10.0-30.0)  L  04/16/19  17:25    


 


Ur Barbiturates Screen  Presumptive negative   04/16/19  17:20    


 


Ur Phencyclidine Scrn  Presumptive negative   04/16/19  17:20    


 


Ur Amphetamines Screen  Presumptive negative   04/16/19  17:20    


 


U Benzodiazepines Scrn  Presumptive negative   04/16/19  17:20    


 


Urine Cocaine Screen  Presumptive negative   04/16/19  17:20    


 


U Marijuana (THC) Screen  Presumptive negative   04/16/19  17:20    


 


Drugs of Abuse Note  Disclamer   04/16/19  17:20    


 


Plasma/Serum Alcohol  < 0.01 % (0-0.07)   04/16/19  17:25    














Active Medications





- Current Medications


Current Medications: 














Generic Name Dose Route Start Last Admin





  Trade Name Freq  PRN Reason Stop Dose Admin


 


Acetaminophen  650 mg  04/16/19 19:00 





  Tylenol  PO  





  Q4H PRN  





  Pain MILD(1-3)/Fever >100.5/HA  


 


Albuterol  2.5 mg  04/16/19 19:00 





  Proventil  IH  





  Q4HRT PRN  





  Shortness Of Breath  


 


Aspirin  325 mg  04/17/19 10:00  04/17/19 13:38





  Aspirin  PO   325 mg





  QDAY MARY   Administration


 


Atorvastatin Calcium  40 mg  04/16/19 22:00  04/16/19 23:58





  Lipitor  PO   40 mg





  [facility]HS [facility]   Administration


 


Bisacodyl  10 mg  04/16/19 20:15 





  Dulcolax  IA  





  QDAY PRN  





  Constipation  


 


Enoxaparin Sodium  40 mg  04/16/19 22:00  04/16/19 22:00





  Lovenox  SUB-Q   Not Given





  QDAY@2200 [facility]  


 


Lorazepam  2 mg  04/16/19 17:10 





  Ativan  IM  





  Q4HR PRN  





  Agitation  


 


Lorazepam  1 mg  04/16/19 18:56 





  Ativan  PO  





  Q8H PRN  





  Agitation  


 


Magnesium Hydroxide  30 ml  04/16/19 20:15 





  Milk Of Magnesia  PO  





  Q4H PRN  





  Constipation  


 


Methimazole  5 mg  04/16/19 20:00  04/17/19 13:38





  Tapazole  PO   5 mg





  TID MARY   Administration


 


Metoclopramide HCl  10 mg  04/16/19 20:15 





  Reglan  PO  





  Q6H PRN  





  Nausea And Vomiting  


 


Ondansetron HCl  4 mg  04/16/19 19:00 





  Zofran  IV  





  Q8H PRN  





  Nausea And Vomiting  


 


Potassium Chloride  40 meq  04/17/19 15:21 





  K-Dur  PO  04/17/19 15:22 





  ONCE ONE  


 


Promethazine HCl  25 mg  04/16/19 20:15 





  Phenergan  IA  





  Q6H PRN  





  Nausea And Vomiting  


 


Quetiapine Fumarate  100 mg  04/16/19 22:00  04/17/19 13:38





  Seroquel  PO   100 mg





  BID MARY   Administration


 


Sertraline HCl  100 mg  04/17/19 10:00  04/17/19 13:38





  Zoloft  PO   100 mg





  DAILY MARY   Administration


 


Sodium Chloride  10 ml  04/16/19 22:00  04/17/19 13:39





  Sodium Chloride Flush Syringe 10 Ml  IV   10 ml





  BID MARY   Administration


 


Sodium Chloride  10 ml  04/16/19 19:00 





  Sodium Chloride Flush Syringe 10 Ml  IV  





  PRN PRN  





  LINE FLUSH

## 2019-04-18 LAB
BACTERIA #/AREA URNS HPF: (no result) /HPF
BAND NEUTROPHILS # (MANUAL): 0 K/MM3
BILIRUB UR QL STRIP: (no result)
BLOOD UR QL VISUAL: (no result)
BUN SERPL-MCNC: 13 MG/DL (ref 7–17)
BUN/CREAT SERPL: 22 %
CALCIUM SERPL-MCNC: 10.2 MG/DL (ref 8.4–10.2)
EOSINOPHIL NFR BLD AUTO: 1.2 % (ref 0–4.3)
HCT VFR BLD CALC: 39.5 % (ref 30.3–42.9)
HDLC SERPL-MCNC: 47 MG/DL (ref 40–59)
HEMOLYSIS INDEX: 15
HGB BLD-MCNC: 13.3 GM/DL (ref 10.1–14.3)
MCHC RBC AUTO-ENTMCNC: 34 % (ref 30–34)
MCV RBC AUTO: 88 FL (ref 79–97)
MONOCYTES % (AUTO): 8.5 % (ref 0–7.3)
MUCOUS THREADS #/AREA URNS HPF: (no result) /HPF
MYELOCYTES # (MANUAL): 0 K/MM3
PH UR STRIP: 5 [PH] (ref 5–7)
PLATELET # BLD: 279 K/MM3 (ref 140–440)
PROMYELOCYTES # (MANUAL): 0 K/MM3
PROT UR STRIP-MCNC: (no result) MG/DL
RBC # BLD AUTO: 4.51 M/MM3 (ref 3.65–5.03)
RBC #/AREA URNS HPF: < 1 /HPF (ref 0–6)
TOTAL CELLS COUNTED BLD: 100
UROBILINOGEN UR-MCNC: < 2 MG/DL (ref ?–2)
WBC #/AREA URNS HPF: 2 /HPF (ref 0–6)

## 2019-04-18 RX ADMIN — METHIMAZOLE SCH: 5 TABLET ORAL at 13:00

## 2019-04-18 RX ADMIN — METHIMAZOLE SCH MG: 5 TABLET ORAL at 09:00

## 2019-04-18 RX ADMIN — DEXTROSE AND SODIUM CHLORIDE SCH MLS/HR: 5; .9 INJECTION, SOLUTION INTRAVENOUS at 21:35

## 2019-04-18 RX ADMIN — METHIMAZOLE SCH MG: 5 TABLET ORAL at 21:35

## 2019-04-18 RX ADMIN — Medication SCH ML: at 21:35

## 2019-04-18 RX ADMIN — ASPIRIN SCH MG: 325 TABLET ORAL at 09:17

## 2019-04-18 RX ADMIN — SERTRALINE SCH MG: 100 TABLET, FILM COATED ORAL at 09:18

## 2019-04-18 RX ADMIN — Medication SCH ML: at 09:18

## 2019-04-18 RX ADMIN — ENOXAPARIN SODIUM SCH MG: 100 INJECTION SUBCUTANEOUS at 21:34

## 2019-04-18 NOTE — PROGRESS NOTE
Assessment and Plan





Acute on chronic metabolic encephalopathy 


-Probably secondary to worsening severe dementia


-MRI brain ordered to rule out acute stroke but patient did not cooperate - 

doubt she has any stroke


-CT head, CTA head and neck negative


-Neurology following





Hypotension, probably med induced


-BP improved, will monitor


-Home antihypertensives on hold





Hyperthyroidism 


-on methimazole


-for out-pt endocrinology f/u





Hypokalemia


-repleted 





SIRS


-Probably secondary to noninfectious cause


-Antibiotic discontinued, no evidence of acute infection





History of severe dementia


-Continue supportive care








Disposition: Patient will need nursing home placement, CM following.

















Subjective


Date of service: 04/18/19


Interval history: 





patient seen and examined


discussed with daughter at bedside


MRI could not be done as she was very agitated even with ativan


per daughter she is at her baseline





Objective





- Exam


Narrative Exam: 








General appearance: Present: no acute distress





- EENT


Eyes: Present: PERRL, EOM intact


ENT: hearing intact, clear oral mucosa





- Neck


Neck: Present: supple





- Respiratory


Respiratory effort: normal


Respiratory: bilateral: CTA





- Cardiovascular


Rhythm: regular


Heart Sounds: Present: S1 & S2





- Extremities


Extremities: No edema





- Abdominal


General gastrointestinal: soft, non-tender, non-distended, normal bowel sounds





- Integumentary


Integumentary: Present: clear, warm, dry





- Neurologic


Neurologic: moves all extremities











- Constitutional


Vitals: 


                               Vital Signs - 12hr











  04/18/19 04/18/19





  08:47 09:27


 


Temperature 98.0 F 


 


Respiratory 18 





Rate  


 


Blood Pressure 119/73 


 


O2 Sat by Pulse  98





Oximetry  














- Labs


CBC & Chem 7: 


                                 04/18/19 05:41





                                 04/18/19 05:41


Labs: 


                              Abnormal lab results











  04/18/19 04/18/19 04/18/19 Range/Units





  05:41 05:41 06:49 


 


WBC  11.1 H    (4.5-11.0)  K/mm3


 


Mono % (Auto)  8.5 H    (0.0-7.3)  %


 


Monocytes % (Manual)  12.0 H    (0.0-7.3)  %


 


Seg Neutrophils # Man  0.0 L    (1.8-7.7)  K/mm3


 


Lymphocytes # (Manual)  0.0 L    (1.2-5.4)  K/mm3


 


Sodium   147 H   (137-145)  mmol/L


 


Creatinine   0.6 L   (0.7-1.2)  mg/dL


 


Glucose   109 H   ()  mg/dL


 


Ur Specific Gravity    1.045 H  (1.003-1.030)

## 2019-04-18 NOTE — CONSULTATION
History of Present Illness


Consult date: 19


Requesting physician: JACKIE BRANHAM


Reason for Consult: altered mental status


Chief complaint: 


altered mental status and dementia








History of present illness: 


This 69-year-old left-handed -American female cannot give a history but I

reviewed notes from the emergency room and have gotten history from her daughter

who is present today and who is herself a nurse.  She was sent here yesterday 

because of altered mental status and low blood pressure.  Pressure here was 

105/59.  Later I spoke to a staff person at her assisted living facility who 

says her blood pressure she thinks runs 112/62 but will be faxing us a list of 

her current blood pressures.  Discharge summary from a week or so ago here lists

amlodipine which her daughter states she has never been on and which the as

sisted living facility states she is not taking as well as her current meds 

which include losartan 100 mg-85 mg HCTZ, oxybutynin 10 mg extended release 

daily in the morning, gabapentin 300 mg every morning for apparently neuropathy 

pain, Ativan 1 mg at bedtime, Seroquel 100 mg twice a day.  Discharge summary 

listed As old which her daughter states she's never been on an hyperthyroidism 

which her daughter states she has never had.  She had been on aspirin 81 mg a 

day but that was omitted since the bottle was empty and they could not rely on a

Mediset that had been filled by her daughter.  CT scan shows old right 

cerebellar lateral hemispheric infarct and mild cerebellar and moderate cerebral

atrophy and white matter disease periventricularly particularly radial to the 

frontal horns.





Past medical history:


Medical: Stroke in  or  according to her daughter with no residual 

effects.  Hypertension, dementia for at least 5 years and moderate to severe in 

the past 3 years.  Had neuropsych testing in [place].


Surgeries: Lumbar disc surgery, rotator cuff surgery, 





Social history: Smoked in the past but not for a long time, was somewhat heavy 

drinker on weekends in the past but not since she became demented, marijuana as 

a "cultural" thing since she is from the [place] but not since dementia 

started.  Worked as a  for over 30 years.





Family history: Maternal grandmother had a stroke, no aneurysms, hypertension in

maternal grandmother and sisters, no epilepsy, diabetes in a granddaughter 

probably not from the patient's side of the family.





Review of systems: Left frontal headache at times, through upon admission but 

not clear if that was associated with headache; dizziness she says sometimes 

that her language is very limited.  Not snoring but does does often take naps.  

Memory problems as noted.


She knew her daughter's name 2 weeks ago but not now.  Numbness and tingling in 

her legs for which she takes gabapentin as noted.





General Appearance: well developed well nourished (per BMI) (but has lost 

perhaps nearly 20 pounds over several years though eats well) early 70s -

American female in NAD, seen with her daughter.  Patient is quite awake despite 

2 mg IM Ativan for MRI.  Woke up at the time of the MRI and would not hold still

according to the nurse.





HEENT: atraumatic, normocephalic; no bruits, 2+ Pedrito without soreness or 

induration or enlargement, sclerae nonicteric.  Oropharynx pink and mois


Neck: supple, no bruits.  


Heart: no murmur but sounds are distant.


Extremities: no clubbing, cyanosis or edema.  2+ dorsalis pedis pulses 

bilaterally.





Neurologic Exam:


Mental Status: Awake, alert, not oriented, speech is very limited, cannot name 

pen but finally says "write with it", cannot do abstractions.  Cannot name 

President even after first name and first letter last name as prompts, cannot do

serial 7's, cannot do right-left task.  


Cranial Nerves: fields full grossly, no papilledema but cannot cooperate enough 

to assess venous pulsations, PERRL, EOMs full grossly without nystagmus, facial 

sensation intact to pinprick, no facial weakness, cannot assess Pang, palate 

rises symmetrically to phonation but gags are absent, shoulder shrug is 5 X 2, 

tongue protrudes midline.  


Cerebellar: finger to nose grossly okay on the left though apraxic, heel-to-shin

is intact with some help in positioning by me.


Sensory: intact to pinprick.  Double simultaneous stimulation cannot be checked.


Motor Exam Upper Extremities: no drift or pronation, cannot cooperate for rapid 

alternating movements.   are 4- on the left but apraxic for gripping on the

right, tone is normal.  No atrophy or fasciculations are noted visually. 


Motor Exam Lower Extremities: Cannot cooperate well enough to assess leg lag; 

iliopsoas is 4- bilaterally and apraxic, quadriceps is 5 right and 4+ left,  

anterior tibials are 4- bilaterally and apraxic, gastrocs are 4+ bilaterally and

apraxic.  Royal intact right but absent left, tone is normal.  No atrophy or 

fasciculations are noted visually. 


Reflexes: Palmomental, snout and jaw jerk are negative.  Triceps, biceps and 

brachioradialis are 3.  Angeles's is positive bilaterally.  Knee jerks are 2+ 

and ankle jerks are 0 bilaterally without clonus.  Toes are upgoing bilaterally 

to Babinski testing.














Past History


Past Surgical History: , Other (Lumbar surgery, shoulder surgery)


Social history: 


Family history: hypertension





Medications and Allergies


                                    Allergies











Allergy/AdvReac Type Severity Reaction Status Date / Time


 


No Known Allergies Allergy   Verified 19 18:27











                                Home Medications











 Medication  Instructions  Recorded  Confirmed  Last Taken  Type


 


Amlodipine Besylate [Norvasc] 10 mg PO DAILY 19 Unknown History


 


Aspirin [Children's Aspirin] 81 mg PO DAILY 19 Unknown History


 


Losartan [Cozaar] 100 mg PO DAILY 19 Unknown History


 


Quetiapine Fumarate [SEROquel] 100 mg PO BID 19 Unknown History


 


Sertraline [Zoloft] 100 mg PO DAILY 19 Unknown History


 


methIMAzole [Tapazole] 5 mg PO TID 19 Unknown History


 


LORazepam [Ativan] 1 mg PO Q8H PRN #14 tablet 04/10/19  Unknown Rx











Active Meds: 


Active Medications





Acetaminophen (Tylenol)  650 mg PO Q4H PRN


   PRN Reason: Pain MILD(1-3)/Fever >100.5/HA


Albuterol (Proventil)  2.5 mg IH Q4HRT PRN


   PRN Reason: Shortness Of Breath


Aspirin (Aspirin)  325 mg PO QDAY [facility]


   Last Admin: 19 09:17 Dose:  325 mg


   Documented by: 


Atorvastatin Calcium (Lipitor)  40 mg PO QHS [facility]


   Last Admin: 19 21:20 Dose:  40 mg


   Documented by: 


Bisacodyl (Dulcolax)  10 mg UT QDAY PRN


   PRN Reason: Constipation


Enoxaparin Sodium (Lovenox)  40 mg SUB-Q QDAY@2200 [facility]


   Last Admin: 19 21:20 Dose:  40 mg


   Documented by: 


Dextrose/Sodium Chloride (D5ns)  1,000 mls @ 75 mls/hr IV AS DIRECT MARY


Lorazepam (Ativan)  2 mg IM Q4HR PRN


   PRN Reason: Agitation


   Last Admin: 19 11:51 Dose:  2 mg


   Documented by: 


Lorazepam (Ativan)  1 mg PO Q8H PRN


   PRN Reason: Agitation


Magnesium Hydroxide (Milk Of Magnesia)  30 ml PO Q4H PRN


   PRN Reason: Constipation


Methimazole (Tapazole)  5 mg PO TID [facility]


   Last Admin: 19 09:00 Dose:  5 mg


   Documented by: 


Metoclopramide HCl (Reglan)  10 mg PO Q6H PRN


   PRN Reason: Nausea And Vomiting


Ondansetron HCl (Zofran)  4 mg IV Q8H PRN


   PRN Reason: Nausea And Vomiting


Promethazine HCl (Phenergan)  25 mg UT Q6H PRN


   PRN Reason: Nausea And Vomiting


Quetiapine Fumarate (Seroquel)  100 mg PO BID [facility]


   Last Admin: 19 09:17 Dose:  100 mg


   Documented by: 


Sertraline HCl (Zoloft)  100 mg PO DAILY [facility]


   Last Admin: 19 09:18 Dose:  100 mg


   Documented by: 


Sodium Chloride (Sodium Chloride Flush Syringe 10 Ml)  10 ml IV BID [facility]


   Last Admin: 19 09:18 Dose:  10 ml


   Documented by: 


Sodium Chloride (Sodium Chloride Flush Syringe 10 Ml)  10 ml IV PRN PRN


   PRN Reason: LINE FLUSH











Physical Examination





- Vital Signs


Vital Signs: 


                                   Vital Signs











Temp Pulse Resp BP Pulse Ox


 


 97.2 F L  77   14   105/59   98 


 


 19 16:02  19 16:02  19 16:02  19 16:02  19 16:02














Results





- Laboratory Findings


CBC and BMP: 


                                 19 05:41





                                 19 05:41


Abnormal Lab Findings: 


                                  Abnormal Labs











  19





  16:29 16:29 17:25


 


WBC  12.1 H  


 


Lymph % (Auto)  13.3 L  


 


Mono % (Auto)   


 


Mono #  0.9 H  


 


Seg Neutrophils %  78.8 H  


 


Monocytes % (Manual)   


 


Seg Neutrophils #  9.5 H  


 


Seg Neutrophils # Man   


 


Lymphocytes # (Manual)   


 


Sodium   


 


Potassium   3.5 L 


 


Creatinine   0.6 L 


 


Glucose   131 H 


 


Free T4   


 


Ur Specific Gravity   


 


Salicylates   


 


Acetaminophen    < 5.0 L














  19





  17:33 17:34 05:41


 


WBC    11.1 H


 


Lymph % (Auto)   


 


Mono % (Auto)    8.5 H


 


Mono #   


 


Seg Neutrophils %   


 


Monocytes % (Manual)    12.0 H


 


Seg Neutrophils #   


 


Seg Neutrophils # Man    0.0 L


 


Lymphocytes # (Manual)    0.0 L


 


Sodium   


 


Potassium   


 


Creatinine   


 


Glucose   


 


Free T4  1.55 H  


 


Ur Specific Gravity   


 


Salicylates   < 0.3 L 


 


Acetaminophen   














  19





  05:41 06:49


 


WBC  


 


Lymph % (Auto)  


 


Mono % (Auto)  


 


Mono #  


 


Seg Neutrophils %  


 


Monocytes % (Manual)  


 


Seg Neutrophils #  


 


Seg Neutrophils # Man  


 


Lymphocytes # (Manual)  


 


Sodium  147 H 


 


Potassium  


 


Creatinine  0.6 L 


 


Glucose  109 H 


 


Free T4  


 


Ur Specific Gravity   1.045 H


 


Salicylates  


 


Acetaminophen  














Assessment and Plan


Impression:


1.  Memory loss


2.  Hx of stroke





Plan:


1.  Decided with Dr. Garcia since unable to safely sedate enough to do MRI 

that it could be done as outpatient, perhaps at an Open MRI.


2.  On aspirin again (was off it for 6 days at assisted living).


3.  Back on statin, was off it per previous PCP just because LDL was staying ok,

 but likely needs to remain on one.


4.  Suggest, at discharge, putting her on half a tablet of the losartan-HCTZ.


5.  Could consider stopping her oxybutynin and even though she's been on it 5 or

 6 years, since it could contribute to confusion in up to 5% of patients per 

UpToDate.


6.  Could try Myrbetriq for bladder since perhaps less likely to cause the 

urinary retention she has had here, and much less likely to cause confusion.  

Alternatively, send her to a Urologist as outpatient.  


7.  Consider lowering Seroquel to 25 mg po qid with extra 50 mg doses prn.





60 min spent including review of multiple CT images and review of several 

medication lists and discussion with her daughter.





Thank you for an interesting consultation on this unfortunate early 70s lady.  

Will sign off, call for any questions.

## 2019-04-19 RX ADMIN — OXYBUTYNIN CHLORIDE SCH MG: 5 TABLET ORAL at 21:55

## 2019-04-19 RX ADMIN — ASPIRIN SCH MG: 325 TABLET ORAL at 09:37

## 2019-04-19 RX ADMIN — SERTRALINE SCH MG: 100 TABLET, FILM COATED ORAL at 09:38

## 2019-04-19 RX ADMIN — Medication SCH ML: at 21:55

## 2019-04-19 RX ADMIN — DEXTROSE AND SODIUM CHLORIDE SCH MLS/HR: 5; .9 INJECTION, SOLUTION INTRAVENOUS at 17:01

## 2019-04-19 RX ADMIN — METHIMAZOLE SCH MG: 5 TABLET ORAL at 09:38

## 2019-04-19 RX ADMIN — ENOXAPARIN SODIUM SCH MG: 100 INJECTION SUBCUTANEOUS at 21:54

## 2019-04-19 RX ADMIN — METHIMAZOLE SCH MG: 5 TABLET ORAL at 17:01

## 2019-04-19 RX ADMIN — Medication SCH ML: at 09:38

## 2019-04-19 RX ADMIN — MEGESTROL ACETATE SCH MG: 40 SUSPENSION ORAL at 18:37

## 2019-04-19 RX ADMIN — METHIMAZOLE SCH MG: 5 TABLET ORAL at 21:55

## 2019-04-19 NOTE — PROGRESS NOTE
Assessment and Plan





Acute on chronic metabolic encephalopathy 


-Probably secondary to worsening severe dementia


-MRI brain ordered to rule out acute stroke but patient did not cooperate - 

doubt she has any stroke


-CT head, CTA head and neck negative


-Neurology following





Hypotension, probably med induced


-BP improved, will monitor


-Home antihypertensives on hold





Hyperthyroidism 


-on methimazole


-for out-pt endocrinology f/u





Hypokalemia


-repleted 





SIRS


-Probably secondary to noninfectious cause


-Antibiotic discontinued, no evidence of acute infection





History of severe dementia


-Continue supportive care








Disposition: Patient will need nursing home placement, CM following.





Brief History: 68 YO Female Personal Care Home Resident with HTN, Dementia, 

Hypothyroidism, Malnutrition presents to ED for evaluation of increased c

onfusion over the past 1 day. Pt was examined by [facility] staff and was found to be 

hypotensive with blood pressure of 60/30.  EMS notified, and  and transported to

[facility] ED. Pt seen and evaluated in ED and a code stroke was called. Teleneurology 

consulted. Pt is outside therapeutic window for TPA. Pt treated with IVF 

resuscitation therapy with No further history obtainable. Pt admitted to 

telemetry and initiated on CVA protocol. Neurology consulted in ED. 





Subjective


Date of service: 04/19/19


Interval history: 





patient seen and examined


MRI could not be done as she was very agitated even with ativan


Discuss with RN/CM


patient having low urine outpt











Objective





- Exam


Narrative Exam: 








General appearance: Present: no acute distress





- EENT


Eyes: Present: PERRL, EOM intact


ENT: hearing intact, clear oral mucosa





- Neck


Neck: Present: supple





- Respiratory


Respiratory effort: normal


Respiratory: bilateral: CTA





- Cardiovascular


Rhythm: regular


Heart Sounds: Present: S1 & S2





- Extremities


Extremities: No edema





- Abdominal


General gastrointestinal: soft, non-tender, non-distended, normal bowel sounds





- Integumentary


Integumentary: Present: clear, warm, dry





- Neurologic


Neurologic: moves all extremities











- Constitutional


Vitals: 


                               Vital Signs - 12hr











  04/19/19 04/19/19 04/19/19





  04:29 08:45 08:46


 


Temperature 97.6 F 97.8 F 


 


Pulse Rate 67  62


 


Respiratory 20  18





Rate   


 


Blood Pressure 128/81  136/77


 


O2 Sat by Pulse 98  100





Oximetry   














- Labs


CBC & Chem 7: 


                                 04/18/19 05:41





                                 04/18/19 05:41

## 2019-04-20 RX ADMIN — ENOXAPARIN SODIUM SCH MG: 100 INJECTION SUBCUTANEOUS at 22:25

## 2019-04-20 RX ADMIN — METHIMAZOLE SCH MG: 5 TABLET ORAL at 09:16

## 2019-04-20 RX ADMIN — OXYBUTYNIN CHLORIDE SCH MG: 5 TABLET ORAL at 22:26

## 2019-04-20 RX ADMIN — OXYBUTYNIN CHLORIDE SCH: 5 TABLET ORAL at 10:37

## 2019-04-20 RX ADMIN — ASPIRIN SCH MG: 325 TABLET ORAL at 09:15

## 2019-04-20 RX ADMIN — METHIMAZOLE SCH MG: 5 TABLET ORAL at 22:26

## 2019-04-20 RX ADMIN — Medication SCH ML: at 22:26

## 2019-04-20 RX ADMIN — MEGESTROL ACETATE SCH: 40 SUSPENSION ORAL at 10:38

## 2019-04-20 RX ADMIN — Medication SCH: at 10:12

## 2019-04-20 RX ADMIN — METHIMAZOLE SCH MG: 5 TABLET ORAL at 13:02

## 2019-04-20 NOTE — PROGRESS NOTE
Assessment and Plan





Acute on chronic metabolic encephalopathy 


-Probably secondary to worsening severe dementia


-MRI brain ordered to rule out acute stroke but patient did not cooperate - 

doubt she has any stroke


-CT head, CTA head and neck negative


-Neurology following





Hypotension, probably med induced


-BP improved, will monitor


-Home antihypertensives on hold





Vit D def, replete





Hyperthyroidism 


-on methimazole


-for out-pt endocrinology f/u





Hypokalemia


-repleted 





SIRS


-Probably secondary to noninfectious cause


-Antibiotic discontinued, no evidence of acute infection





History of severe dementia


-Continue supportive care





Poor appetite, added megace








Disposition: Patient will need nursing home placement, CM following.





Brief History: 70 YO Female Personal Care Home Resident with HTN, Dementia, 

Hypothyroidism, Malnutrition presents to ED for evaluation of increased 

confusion over the past 1 day. Pt was examined by [facility] staff and was found to be 

hypotensive with blood pressure of 60/30.  EMS notified, and  and transported to

[facility] ED. Pt seen and evaluated in ED and a code stroke was called. Teleneurology 

consulted. Pt is outside therapeutic window for TPA. Pt treated with IVF 

resuscitation therapy with No further history obtainable. Pt admitted to 

telemetry and initiated on CVA protocol. Neurology consulted in ED. 





Subjective


Date of service: 04/20/19


Interval history: 





patient seen and examined


MRI could not be done as she was very agitated even with ativan


Discuss with RN/CM


patient having poor oral intake, refused iv fluid











Objective





- Exam


Narrative Exam: 








General appearance: Present: no acute distress





- EENT


Eyes: Present: PERRL, EOM intact


ENT: hearing intact, clear oral mucosa





- Neck


Neck: Present: supple





- Respiratory


Respiratory effort: normal


Respiratory: bilateral: CTA





- Cardiovascular


Rhythm: regular


Heart Sounds: Present: S1 & S2





- Extremities


Extremities: No edema





- Abdominal


General gastrointestinal: soft, non-tender, non-distended, normal bowel sounds





- Integumentary


Integumentary: Present: clear, warm, dry





- Neurologic


Neurologic: moves all extremities











- Constitutional


Vitals: 


                               Vital Signs - 12hr











  04/20/19 04/20/19 04/20/19





  04:20 09:10 09:11


 


Temperature 98.0 F  97.9 F


 


Pulse Rate  67 


 


Respiratory 18 18 





Rate   


 


Blood Pressure 123/69 129/67 


 


O2 Sat by Pulse  100 





Oximetry   














- Labs


CBC & Chem 7: 


                                 04/18/19 05:41





                                 04/18/19 05:41


Labs: 


                              Abnormal lab results











  04/18/19 Range/Units





  05:41 


 


25-OH Vitamin D Total  29 L  ()  ng/mL

## 2019-04-21 RX ADMIN — METHIMAZOLE SCH MG: 5 TABLET ORAL at 22:06

## 2019-04-21 RX ADMIN — OXYBUTYNIN CHLORIDE SCH MG: 5 TABLET ORAL at 21:30

## 2019-04-21 RX ADMIN — OXYBUTYNIN CHLORIDE SCH MG: 5 TABLET ORAL at 10:39

## 2019-04-21 RX ADMIN — METHIMAZOLE SCH: 5 TABLET ORAL at 17:01

## 2019-04-21 RX ADMIN — Medication SCH ML: at 10:39

## 2019-04-21 RX ADMIN — Medication SCH: at 21:31

## 2019-04-21 RX ADMIN — METHIMAZOLE SCH MG: 5 TABLET ORAL at 10:39

## 2019-04-21 RX ADMIN — ENOXAPARIN SODIUM SCH MG: 100 INJECTION SUBCUTANEOUS at 21:30

## 2019-04-21 RX ADMIN — MEGESTROL ACETATE SCH MG: 40 SUSPENSION ORAL at 10:39

## 2019-04-21 RX ADMIN — ASPIRIN SCH MG: 325 TABLET ORAL at 10:39

## 2019-04-21 NOTE — PROGRESS NOTE
Assessment and Plan





Acute on chronic metabolic encephalopathy 


-Probably secondary to worsening severe dementia


-MRI brain ordered to rule out acute stroke but patient did not cooperate - 

doubt she has any stroke


-CT head, CTA head and neck negative


-Neurology following





Hypotension, probably med induced


-BP improved, will monitor


-Home antihypertensives on hold





Vit D def, replete





Hyperthyroidism 


-on methimazole


-for out-pt endocrinology f/u





Hypokalemia


-repleted 





SIRS


-Probably secondary to noninfectious cause


-Antibiotic discontinued, no evidence of acute infection





History of severe dementia


-Continue supportive care





Poor appetite, added megace








Disposition: Patient will need nursing home placement, CM following.





Brief History: 70 YO Female Personal Care Home Resident with HTN, Dementia, 

Hypothyroidism, Malnutrition presents to ED for evaluation of increased 

confusion over the past 1 day. Pt was examined by [facility] staff and was found to be 

hypotensive with blood pressure of 60/30.  EMS notified, and  and transported to

[facility] ED. Pt seen and evaluated in ED and a code stroke was called. Teleneurology 

consulted. Pt is outside therapeutic window for TPA. Pt treated with IVF 

resuscitation therapy with No further history obtainable. Pt admitted to 

telemetry and initiated on CVA protocol. Neurology consulted in ED. 





Subjective


Date of service: 04/21/19


Interval history: 





patient seen and examined


MRI could not be done as she was very agitated even with ativan


Discuss with RN/CM


appetite slightly improved, getting IV fluid, poor historian due to dementia 











Objective





- Exam


Narrative Exam: 








General appearance: Present: no acute distress





- EENT


Eyes: Present: PERRL, EOM intact


ENT: hearing intact, clear oral mucosa





- Neck


Neck: Present: supple





- Respiratory


Respiratory effort: normal


Respiratory: bilateral: CTA





- Cardiovascular


Rhythm: regular


Heart Sounds: Present: S1 & S2





- Extremities


Extremities: No edema





- Abdominal


General gastrointestinal: soft, non-tender, non-distended, normal bowel sounds





- Integumentary


Integumentary: Present: clear, warm, dry





- Neurologic


Neurologic: moves all extremities











- Constitutional


Vitals: 


                               Vital Signs - 12hr











  04/21/19 04/21/19 04/21/19





  04:27 09:27 09:32


 


Temperature 98.3 F  97.8 F


 


Pulse Rate  72 


 


Respiratory 18 18 





Rate   


 


Blood Pressure 125/49 147/69 


 


O2 Sat by Pulse  99 





Oximetry   














- Labs


CBC & Chem 7: 


                                 04/18/19 05:41





                                 04/18/19 05:41

## 2019-04-22 RX ADMIN — Medication SCH ML: at 10:19

## 2019-04-22 RX ADMIN — Medication SCH ML: at 21:59

## 2019-04-22 RX ADMIN — METHIMAZOLE SCH MG: 5 TABLET ORAL at 21:58

## 2019-04-22 RX ADMIN — OXYBUTYNIN CHLORIDE SCH MG: 5 TABLET ORAL at 21:59

## 2019-04-22 RX ADMIN — METHIMAZOLE SCH MG: 5 TABLET ORAL at 14:16

## 2019-04-22 RX ADMIN — OXYBUTYNIN CHLORIDE SCH MG: 5 TABLET ORAL at 09:46

## 2019-04-22 RX ADMIN — ENOXAPARIN SODIUM SCH MG: 100 INJECTION SUBCUTANEOUS at 21:58

## 2019-04-22 RX ADMIN — METHIMAZOLE SCH MG: 5 TABLET ORAL at 10:19

## 2019-04-22 RX ADMIN — MEGESTROL ACETATE SCH MG: 40 SUSPENSION ORAL at 09:46

## 2019-04-22 RX ADMIN — DEXTROSE AND SODIUM CHLORIDE SCH MLS/HR: 5; .9 INJECTION, SOLUTION INTRAVENOUS at 21:59

## 2019-04-22 RX ADMIN — ASPIRIN SCH MG: 325 TABLET ORAL at 09:46

## 2019-04-22 NOTE — PROGRESS NOTE
Assessment and Plan





Acute on chronic metabolic encephalopathy 


-Probably secondary to worsening severe dementia


-MRI brain ordered to rule out acute stroke but patient did not cooperate - 

doubt she has any stroke


-CT head, CTA head and neck negative


-Neurology following





Hypotension, probably med induced


-BP improved, will monitor


-Home antihypertensives on hold





Vit D def, replete





Hyperthyroidism 


-on methimazole


-for out-pt endocrinology f/u





Hypokalemia


-repleted 





SIRS


-Probably secondary to noninfectious cause


-Antibiotic discontinued, no evidence of acute infection





History of severe dementia


-Continue supportive care





Poor appetite, added megace





Hypernatremia, on iv fluid, repeat BMP








Disposition: Patient will need nursing home placement, CM following.





Brief History: 70 YO Female Personal Care Home Resident with HTN, Dementia, 

Hypothyroidism, Malnutrition presents to ED for evaluation of increased 

confusion over the past 1 day. Pt was examined by [facility] staff and was found to be 

hypotensive with blood pressure of 60/30.  EMS notified, and  and transported to

[facility] ED. Pt seen and evaluated in ED and a code stroke was called. Teleneurology 

consulted. Pt is outside therapeutic window for TPA. Pt treated with IVF 

resuscitation therapy with No further history obtainable. Pt admitted to 

telemetry and initiated on CVA protocol. Neurology consulted in ED. 





Subjective


Date of service: 04/22/19


Interval history: 





patient seen and examined


MRI could not be done as she was very agitated even with ativan


Discuss with RN/CM


appetite slightly improved, getting IV fluid, poor historian due to dementia 











Objective





- Exam


Narrative Exam: 








General appearance: Present: no acute distress





- EENT


Eyes: Present: PERRL, EOM intact


ENT: hearing intact, clear oral mucosa





- Neck


Neck: Present: supple





- Respiratory


Respiratory effort: normal


Respiratory: bilateral: CTA





- Cardiovascular


Rhythm: regular


Heart Sounds: Present: S1 & S2





- Extremities


Extremities: No edema





- Abdominal


General gastrointestinal: soft, non-tender, non-distended, normal bowel sounds





- Integumentary


Integumentary: Present: clear, warm, dry





- Neurologic


Neurologic: moves all extremities











- Constitutional


Vitals: 


                               Vital Signs - 12hr











  04/22/19 04/22/19 04/22/19





  07:07 10:00 14:00


 


Temperature 98.5 F  98.6 F


 


Pulse Rate 74  70


 


Respiratory 18  18





Rate   


 


Blood Pressure 111/65  106/60





[Right]   


 


O2 Sat by Pulse 99 99 99





Oximetry   














- Labs


CBC & Chem 7: 


                                 04/18/19 05:41





                                 04/18/19 05:41

## 2019-04-23 LAB
BASOPHILS # (AUTO): 0 K/MM3 (ref 0–0.1)
BASOPHILS NFR BLD AUTO: 0.4 % (ref 0–1.8)
BUN SERPL-MCNC: 7 MG/DL (ref 7–17)
BUN/CREAT SERPL: 14 %
CALCIUM SERPL-MCNC: 9.3 MG/DL (ref 8.4–10.2)
EOSINOPHIL # BLD AUTO: 0.1 K/MM3 (ref 0–0.4)
EOSINOPHIL NFR BLD AUTO: 1.4 % (ref 0–4.3)
HCT VFR BLD CALC: 34.3 % (ref 30.3–42.9)
HEMOLYSIS INDEX: 5
HGB BLD-MCNC: 11.5 GM/DL (ref 10.1–14.3)
LYMPHOCYTES # BLD AUTO: 2.3 K/MM3 (ref 1.2–5.4)
LYMPHOCYTES NFR BLD AUTO: 28.3 % (ref 13.4–35)
MCHC RBC AUTO-ENTMCNC: 33 % (ref 30–34)
MCV RBC AUTO: 89 FL (ref 79–97)
MONOCYTES # (AUTO): 0.5 K/MM3 (ref 0–0.8)
MONOCYTES % (AUTO): 6.2 % (ref 0–7.3)
PLATELET # BLD: 223 K/MM3 (ref 140–440)
RBC # BLD AUTO: 3.85 M/MM3 (ref 3.65–5.03)

## 2019-04-23 RX ADMIN — Medication SCH ML: at 23:17

## 2019-04-23 RX ADMIN — Medication SCH ML: at 10:43

## 2019-04-23 RX ADMIN — METHIMAZOLE SCH MG: 5 TABLET ORAL at 14:54

## 2019-04-23 RX ADMIN — ASPIRIN SCH MG: 325 TABLET ORAL at 10:43

## 2019-04-23 RX ADMIN — OXYBUTYNIN CHLORIDE SCH MG: 5 TABLET ORAL at 23:17

## 2019-04-23 RX ADMIN — ENOXAPARIN SODIUM SCH MG: 100 INJECTION SUBCUTANEOUS at 23:17

## 2019-04-23 RX ADMIN — DEXTROSE AND SODIUM CHLORIDE SCH MLS/HR: 5; .9 INJECTION, SOLUTION INTRAVENOUS at 23:18

## 2019-04-23 RX ADMIN — OXYBUTYNIN CHLORIDE SCH MG: 5 TABLET ORAL at 10:42

## 2019-04-23 RX ADMIN — DEXTROSE AND SODIUM CHLORIDE SCH MLS/HR: 5; .9 INJECTION, SOLUTION INTRAVENOUS at 12:20

## 2019-04-23 RX ADMIN — MEGESTROL ACETATE SCH MG: 40 SUSPENSION ORAL at 10:43

## 2019-04-23 RX ADMIN — METHIMAZOLE SCH MG: 5 TABLET ORAL at 23:17

## 2019-04-23 RX ADMIN — METHIMAZOLE SCH MG: 5 TABLET ORAL at 10:42

## 2019-04-23 NOTE — PROGRESS NOTE
Assessment and Plan





Acute on chronic metabolic encephalopathy 


-Probably secondary to worsening severe dementia


-MRI brain ordered to rule out acute stroke but patient did not cooperate - 

doubt she has any stroke


-CT head, CTA head and neck negative


-Neurology following





Hypotension, probably med induced


-BP improved, will monitor


-Home antihypertensives on hold





Vit D def, replete





Hyperthyroidism 


-on methimazole


-for out-pt endocrinology f/u





Hypokalemia


-cont to replete and monitor level





SIRS


-Probably secondary to noninfectious cause


-Antibiotic discontinued, no evidence of acute infection





History of severe dementia


-Continue supportive care





Poor appetite, added megace





Hypernatremia, on iv fluid, repeat BMP








Disposition: Patient will need nursing home placement, CM following.





Brief History: 68 YO Female Personal Care Home Resident with HTN, Dementia, 

Hypothyroidism, Malnutrition presents to ED for evaluation of increased 

confusion over the past 1 day. Pt was examined by [facility] staff and was found to be 

hypotensive with blood pressure of 60/30.  EMS notified, and  and transported to

[facility] ED. Pt seen and evaluated in ED and a code stroke was called. Teleneurology 

consulted. Pt is outside therapeutic window for TPA. Pt treated with IVF 

resuscitation therapy with No further history obtainable. Pt admitted to t

elemet and initiated on CVA protocol. Neurology consulted in ED. 





Subjective


Date of service: 04/23/19


Interval history: 





patient seen and examined


MRI could not be done as she was very agitated even with ativan


Discuss with RN/CM


appetite improved, getting IV fluid, poor historian due to dementia 











Objective





- Exam


Narrative Exam: 








General appearance: Present: no acute distress





- EENT


Eyes: Present: PERRL, EOM intact


ENT: hearing intact, clear oral mucosa





- Neck


Neck: Present: supple





- Respiratory


Respiratory effort: normal


Respiratory: bilateral: CTA





- Cardiovascular


Rhythm: regular


Heart Sounds: Present: S1 & S2





- Extremities


Extremities: No edema





- Abdominal


General gastrointestinal: soft, non-tender, non-distended, normal bowel sounds





- Integumentary


Integumentary: Present: clear, warm, dry





- Neurologic


Neurologic: moves all extremities











- Constitutional


Vitals: 


                               Vital Signs - 12hr











  04/23/19 04/23/19 04/23/19





  02:51 07:49 07:57


 


Temperature 98.4 F 98.9 F 


 


Pulse Rate 78  


 


Respiratory 20  





Rate   


 


Blood Pressure 129/83 119/76 


 


O2 Sat by Pulse 97  98





Oximetry   














- Labs


CBC & Chem 7: 


                                 04/23/19 07:18





                                 04/23/19 07:18


Labs: 


                              Abnormal lab results











  04/23/19 04/23/19 Range/Units





  07:18 07:18 


 


RDW  13.1 L   (13.2-15.2)  %


 


Potassium   3.4 L  (3.6-5.0)  mmol/L


 


Chloride   110.3 H  ()  mmol/L


 


Creatinine   0.5 L  (0.7-1.2)  mg/dL

## 2019-04-24 RX ADMIN — Medication SCH ML: at 08:59

## 2019-04-24 RX ADMIN — OXYBUTYNIN CHLORIDE SCH MG: 5 TABLET ORAL at 22:15

## 2019-04-24 RX ADMIN — METHIMAZOLE SCH MG: 5 TABLET ORAL at 07:37

## 2019-04-24 RX ADMIN — MEGESTROL ACETATE SCH MG: 40 SUSPENSION ORAL at 08:59

## 2019-04-24 RX ADMIN — METHIMAZOLE SCH MG: 5 TABLET ORAL at 14:51

## 2019-04-24 RX ADMIN — ENOXAPARIN SODIUM SCH MG: 100 INJECTION SUBCUTANEOUS at 22:15

## 2019-04-24 RX ADMIN — ASPIRIN SCH MG: 325 TABLET ORAL at 08:59

## 2019-04-24 RX ADMIN — OXYBUTYNIN CHLORIDE SCH MG: 5 TABLET ORAL at 08:59

## 2019-04-24 RX ADMIN — DEXTROSE AND SODIUM CHLORIDE SCH MLS/HR: 5; .9 INJECTION, SOLUTION INTRAVENOUS at 12:08

## 2019-04-24 RX ADMIN — Medication SCH ML: at 22:20

## 2019-04-24 NOTE — PROGRESS NOTE
Assessment and Plan





Acute on chronic metabolic encephalopathy 


-Probably secondary to worsening severe dementia


-MRI brain ordered to rule out acute stroke but patient did not cooperate - 

doubt she has any stroke


-CT head, CTA head and neck negative


-Neurology following





Hypotension, probably med induced


-BP improved, will monitor


-Home antihypertensives on hold





Vit D def, replete





B-1 def, started on thiamin





Hyperthyroidism 


-on methimazole


-for out-pt endocrinology f/u





Hypokalemia


-cont to replete and monitor level





SIRS


-Probably secondary to noninfectious cause


-Antibiotic discontinued, no evidence of acute infection





History of severe dementia


-Continue supportive care





Poor appetite, added megace





Hypernatremia, improved with iv fluid, 








Disposition: Patient will need nursing home placement, CM following.





Brief History: 68 YO Female Personal Care Home Resident with HTN, Dementia, 

Hypothyroidism, Malnutrition presents to ED for evaluation of increased 

confusion over the past 1 day. Pt was examined by [facility] staff and was found to be 

hypotensive with blood pressure of 60/30.  EMS notified, and  and transported to

[facility] ED. Pt seen and evaluated in ED and a code stroke was called. Teleneurology 

consulted. Pt is outside therapeutic window for TPA. Pt treated with IVF 

resuscitation therapy with No further history obtainable. Pt admitted to 

telemetry and initiated on CVA protocol. Neurology consulted in ED. 





Subjective


Date of service: 04/24/19


Interval history: 





patient seen and examined


MRI could not be done as she was very agitated even with ativan


Discuss with RN/CM


appetite improved, poor historian due to dementia 











Objective





- Exam


Narrative Exam: 








General appearance: Present: no acute distress





- EENT


Eyes: Present: PERRL, EOM intact


ENT: hearing intact, clear oral mucosa





- Neck


Neck: Present: supple





- Respiratory


Respiratory effort: normal


Respiratory: bilateral: CTA





- Cardiovascular


Rhythm: regular


Heart Sounds: Present: S1 & S2





- Extremities


Extremities: No edema





- Abdominal


General gastrointestinal: soft, non-tender, non-distended, normal bowel sounds





- Integumentary


Integumentary: Present: clear, warm, dry





- Neurologic


Neurologic: moves all extremities











- Constitutional


Vitals: 


                               Vital Signs - 12hr











  04/24/19 04/24/19 04/24/19





  03:04 03:07 07:16


 


Temperature 98.3 F  98.3 F


 


Pulse Rate 67  69


 


Pulse Rate [   





Right Radial]   


 


Respiratory 20  18





Rate   


 


Blood Pressure   136/70


 


Blood Pressure  168/74 





[Right]   


 


O2 Sat by Pulse 99  97





Oximetry   














  04/24/19





  09:51


 


Temperature 


 


Pulse Rate 


 


Pulse Rate [ 68





Right Radial] 


 


Respiratory 





Rate 


 


Blood Pressure 


 


Blood Pressure 





[Right] 


 


O2 Sat by Pulse 97





Oximetry 














- Labs


CBC & Chem 7: 


                                 04/23/19 07:18





                                 04/24/19 10:29


Labs: 


                              Abnormal lab results











  04/18/19 Range/Units





  05:41 


 


Vitamin B1  <6 L  (8-30)  nmol/L

## 2019-04-25 VITALS — DIASTOLIC BLOOD PRESSURE: 62 MMHG | SYSTOLIC BLOOD PRESSURE: 130 MMHG

## 2019-04-25 LAB — VITAMIN D2 SERPL-MCNC: (no result) PG/ML

## 2019-04-25 RX ADMIN — METHIMAZOLE SCH MG: 5 TABLET ORAL at 13:28

## 2019-04-25 RX ADMIN — METHIMAZOLE SCH MG: 5 TABLET ORAL at 07:06

## 2019-04-25 RX ADMIN — DEXTROSE AND SODIUM CHLORIDE SCH MLS/HR: 5; .9 INJECTION, SOLUTION INTRAVENOUS at 13:28

## 2019-04-25 RX ADMIN — OXYBUTYNIN CHLORIDE SCH MG: 5 TABLET ORAL at 10:01

## 2019-04-25 RX ADMIN — MEGESTROL ACETATE SCH MG: 40 SUSPENSION ORAL at 10:02

## 2019-04-25 RX ADMIN — Medication SCH: at 13:29

## 2019-04-25 RX ADMIN — METHIMAZOLE SCH: 5 TABLET ORAL at 06:04

## 2019-04-25 RX ADMIN — ASPIRIN SCH MG: 325 TABLET ORAL at 10:02

## 2019-04-25 NOTE — PROGRESS NOTE
Assessment and Plan





Acute on chronic metabolic encephalopathy 


-Probably secondary to worsening severe dementia


-MRI brain ordered to rule out acute stroke but patient did not cooperate - 

doubt she has any stroke


-CT head, CTA head and neck negative


-Neurology following





Hypotension, probably med induced


-BP improved, will monitor


-Home antihypertensives on hold





Vit D def, replete





B-1 def, started on thiamin





Hyperthyroidism 


-on methimazole


-for out-pt endocrinology f/u





Hypokalemia


-cont to replete and monitor level





SIRS


-Probably secondary to noninfectious cause


-Antibiotic discontinued, no evidence of acute infection





History of severe dementia


-Continue supportive care





Poor appetite, added megace





Hypernatremia, improved with iv fluid, 








Disposition: Patient will need nursing home placement, CM following.





Brief History: 68 YO Female Personal Care Home Resident with HTN, Dementia, 

Hypothyroidism, Malnutrition presents to ED for evaluation of increased 

confusion over the past 1 day. Pt was examined by [facility] staff and was found to be 

hypotensive with blood pressure of 60/30.  EMS notified, and  and transported to

[facility] ED. Pt seen and evaluated in ED and a code stroke was called. Teleneurology 

consulted. Pt is outside therapeutic window for TPA. Pt treated with IVF 

resuscitation therapy with No further history obtainable. Pt admitted to 

telemetry and initiated on CVA protocol. Neurology consulted in ED. 





Subjective


Date of service: 04/25/19


Interval history: 





patient seen and examined


MRI could not be done as she was very agitated even with ativan


Discuss with RN/CM


appetite improved, poor historian due to dementia 











Objective





- Constitutional


Vitals: 


                               Vital Signs - 12hr











  04/25/19 04/25/19 04/25/19





  02:42 07:32 10:00


 


Temperature 98.3 F 98.9 F 


 


Pulse Rate 71 65 


 


Pulse Rate [   64





Right Radial]   


 


Respiratory 16 18 18





Rate   


 


Blood Pressure 136/71 129/61 


 


O2 Sat by Pulse 100 97 97





Oximetry   














- Labs


CBC & Chem 7: 


                                 04/23/19 07:18





                                 04/24/19 10:29

## 2019-04-25 NOTE — DISCHARGE SUMMARY
Providers





- Providers


Date of Admission: 


04/16/19 19:00





Date of discharge: 04/25/19


Attending physician: 


NIRMAL WELLS





                                        





04/16/19 19:25


Consult to Physician [CONS] Urgent 


   Comment: 


   Consulting Provider: ANDREIA KO


   Physician Instructions: 


   Reason For Exam: ? cva





04/16/19 20:13


Consult to Physician [CONS] Routine 


   Comment: 


   Consulting Provider: LISSY JARVIS


   Physician Instructions: 


   Reason For Exam: L MCA syndrome





04/16/19 20:15


Occupational Therapy Evaluate and Treat [CONS] Routine 


   Comment: 


   Reason For Exam: Neuro deficits


Physical Therapy Evaluation and Treat [CONS] Routine 


   Comment: 


   Reason For Exam: Neuro deficits





04/16/19 23:31


Consult to Case Management [CONS] Routine 


   Services Needed at Discharge: 


   Notified:: case management


   Additional Physician Instructions: daughter is interested in long term care? 

[facility], P[facility] care home.


                                        also interested in geriatric psych





04/18/19 14:01


Speech Therapy Evaluation and Treat [CONS] Urgent 


   Reason For Exam: swallow evaluation














Hospitalization


Reason for admission: altered mental status


Condition: Fair


Pertinent studies: 





Chest x-ray


Head CT/CTA


Next CTA


2-D echocardiogram








Hospital course: 


Brief History: 68 YO Female Personal Care Home Resident with HTN, Dementia, 

Hypothyroidism, Malnutrition presents to ED for evaluation of increased 

confusion over the past 1 day. Pt was examined by [facility] staff and was found to be 

hypotensive with blood pressure of 60/30.  EMS notified, and  and transported to

 [facility] ED. Pt seen and evaluated in ED and a code stroke was called. Teleneurology

 consulted. Patient was outside therapeutic window for TPA. Patient was admitted

 to telemetry and initiated on CVA protocol. Neurology consulted in ED. her 

workup was negative for any acute stroke.  Family requested nursing home 

placement.    consulted consent of referral for nursing

 home.  But her nursing home placement was denied by the insurance as she does 

not have the coverage for it.  Patient was then discharged to personal care home

 with 24/7 total care.





Discharge diagnosis and management:


Acute on chronic metabolic encephalopathy 


-Probably secondary to worsening severe dementia


-MRI brain ordered to rule out acute stroke but patient did not cooperate - 

doubt she has any stroke


-CT head, CTA head and neck negative


-Neurology was following





Hypotension, probably med induced


-BP improved, 


-Home antihypertensives on hold





Vit D def, replete





B-1 def, started on thiamin





Hyperthyroidism 


-on methimazole


-for out-pt endocrinology f/u





Hypokalemia


-repleted and monitored level





SIRS


-Probably secondary to noninfectious cause


-Antibiotic discontinued, no evidence of acute infection





History of severe dementia


-Continue supportive care





Poor appetite, added megace - appetite improved





Hypernatremia, improved with iv fluid, 





 





Disposition: DC-01 TO HOME OR SELFCARE


Time spent for discharge: 34 minutes





Core Measure Documentation





- Palliative Care


Palliative Care/ Comfort Measures: Not Applicable





- Core Measures


Any of the following diagnoses?: history only





Exam





- Physical Exam


Narrative exam: 








General appearance: Present: no acute distress





- EENT


Eyes: Present: PERRL, EOM intact


ENT: hearing intact, clear oral mucosa





- Neck


Neck: Present: supple





- Respiratory


Respiratory effort: normal


Respiratory: bilateral: CTA





- Cardiovascular


Rhythm: regular


Heart Sounds: Present: S1 & S2





- Extremities


Extremities: No edema





- Abdominal


General gastrointestinal: soft, non-tender, non-distended, normal bowel sounds





- Integumentary


Integumentary: Present: clear, warm, dry





- Neurologic


Neurologic: moves all extremities











- Constitutional


Vitals: 


                                        











Temp Pulse Resp BP Pulse Ox


 


 99.0 F   83   18   111/60   99 


 


 04/25/19 14:35  04/25/19 14:35  04/25/19 14:35  04/25/19 14:35  04/25/19 14:35














Plan


Activity: up only with assistance, fall precautions


Weight Bearing Status: Non-Weight Bearing


Diet: low fat


Follow up with: 


JEAN-PIERRE CERVANTES [Other] - 3-5 Days

## 2019-04-26 LAB
THYROGLOB AB SERPL-ACNC: (no result) [IU]/ML
THYROPEROXIDASE AB [TITER] IN SERUM OR PLASMA: (no result) {TITER}

## 2019-07-31 ENCOUNTER — DOCUMENTATION ONLY (OUTPATIENT)
Dept: FAMILY MEDICINE | Facility: CLINIC | Age: 69
End: 2019-07-31

## 2019-07-31 NOTE — PROGRESS NOTES
Pre-Visit Chart Review  For Appointment Scheduled on 8/1/19    Health Maintenance Due   Topic Date Due    TETANUS VACCINE  01/24/1968    Pneumococcal Vaccine (65+ Low/Medium Risk) (1 of 2 - PCV13) 01/24/2014    Lipid Panel  03/07/2019

## 2019-09-04 DIAGNOSIS — N39.498 OTHER URINARY INCONTINENCE: ICD-10-CM

## 2019-09-04 DIAGNOSIS — F01.518 VASCULAR DEMENTIA WITH BEHAVIOR DISTURBANCE: ICD-10-CM

## 2019-09-04 DIAGNOSIS — F03.91 DEMENTIA WITH BEHAVIORAL DISTURBANCE, UNSPECIFIED DEMENTIA TYPE: ICD-10-CM

## 2019-09-04 RX ORDER — QUETIAPINE FUMARATE 100 MG/1
TABLET, FILM COATED ORAL
Qty: 90 TABLET | Refills: 0 | Status: SHIPPED | OUTPATIENT
Start: 2019-09-04

## 2020-05-05 ENCOUNTER — PATIENT MESSAGE (OUTPATIENT)
Dept: ADMINISTRATIVE | Facility: HOSPITAL | Age: 70
End: 2020-05-05

## 2020-10-05 ENCOUNTER — PATIENT MESSAGE (OUTPATIENT)
Dept: ADMINISTRATIVE | Facility: HOSPITAL | Age: 70
End: 2020-10-05

## 2021-01-04 ENCOUNTER — PATIENT MESSAGE (OUTPATIENT)
Dept: ADMINISTRATIVE | Facility: HOSPITAL | Age: 71
End: 2021-01-04

## 2021-04-05 ENCOUNTER — PATIENT MESSAGE (OUTPATIENT)
Dept: ADMINISTRATIVE | Facility: HOSPITAL | Age: 71
End: 2021-04-05

## 2021-07-06 ENCOUNTER — PATIENT MESSAGE (OUTPATIENT)
Dept: ADMINISTRATIVE | Facility: HOSPITAL | Age: 71
End: 2021-07-06

## 2021-10-07 ENCOUNTER — PATIENT MESSAGE (OUTPATIENT)
Dept: ADMINISTRATIVE | Facility: HOSPITAL | Age: 71
End: 2021-10-07

## 2022-04-30 ENCOUNTER — TELEPHONE ENCOUNTER (OUTPATIENT)
Dept: URBAN - METROPOLITAN AREA CLINIC 121 | Facility: CLINIC | Age: 72
End: 2022-04-30

## 2022-05-01 ENCOUNTER — TELEPHONE ENCOUNTER (OUTPATIENT)
Dept: URBAN - METROPOLITAN AREA CLINIC 121 | Facility: CLINIC | Age: 72
End: 2022-05-01